# Patient Record
Sex: FEMALE | Race: BLACK OR AFRICAN AMERICAN
[De-identification: names, ages, dates, MRNs, and addresses within clinical notes are randomized per-mention and may not be internally consistent; named-entity substitution may affect disease eponyms.]

---

## 2020-05-23 ENCOUNTER — HOSPITAL ENCOUNTER (INPATIENT)
Dept: HOSPITAL 47 - EC | Age: 50
LOS: 9 days | DRG: 870 | End: 2020-06-01
Attending: FAMILY MEDICINE | Admitting: FAMILY MEDICINE
Payer: COMMERCIAL

## 2020-05-23 VITALS — BODY MASS INDEX: 29.9 KG/M2

## 2020-05-23 DIAGNOSIS — G93.1: ICD-10-CM

## 2020-05-23 DIAGNOSIS — Z11.59: ICD-10-CM

## 2020-05-23 DIAGNOSIS — N39.0: ICD-10-CM

## 2020-05-23 DIAGNOSIS — Z85.3: ICD-10-CM

## 2020-05-23 DIAGNOSIS — N17.0: ICD-10-CM

## 2020-05-23 DIAGNOSIS — E83.51: ICD-10-CM

## 2020-05-23 DIAGNOSIS — D68.9: ICD-10-CM

## 2020-05-23 DIAGNOSIS — F10.129: ICD-10-CM

## 2020-05-23 DIAGNOSIS — A41.51: Primary | ICD-10-CM
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
## 2020-05-23 DIAGNOSIS — J96.01: ICD-10-CM

## 2020-05-23 DIAGNOSIS — E16.2: ICD-10-CM

## 2020-05-23 DIAGNOSIS — R01.1: ICD-10-CM

## 2020-05-23 DIAGNOSIS — E87.6: ICD-10-CM

## 2020-05-23 DIAGNOSIS — R11.2: ICD-10-CM

## 2020-05-23 DIAGNOSIS — K70.11: ICD-10-CM

## 2020-05-23 DIAGNOSIS — H11.32: ICD-10-CM
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
## 2020-05-23 DIAGNOSIS — F41.9: ICD-10-CM

## 2020-05-23 DIAGNOSIS — R45.1: ICD-10-CM

## 2020-05-23 DIAGNOSIS — K70.31: ICD-10-CM

## 2020-05-23 DIAGNOSIS — H05.20: ICD-10-CM

## 2020-05-23 DIAGNOSIS — K91.72: ICD-10-CM

## 2020-05-23 DIAGNOSIS — D69.59: ICD-10-CM

## 2020-05-23 DIAGNOSIS — D62: ICD-10-CM

## 2020-05-23 DIAGNOSIS — T43.211A: ICD-10-CM

## 2020-05-23 DIAGNOSIS — F32.9: ICD-10-CM

## 2020-05-23 DIAGNOSIS — R65.21: ICD-10-CM

## 2020-05-23 DIAGNOSIS — F17.200: ICD-10-CM

## 2020-05-23 DIAGNOSIS — Z79.899: ICD-10-CM

## 2020-05-23 DIAGNOSIS — K72.00: ICD-10-CM

## 2020-05-23 DIAGNOSIS — R32: ICD-10-CM

## 2020-05-23 DIAGNOSIS — J98.11: ICD-10-CM

## 2020-05-23 DIAGNOSIS — T51.0X1A: ICD-10-CM

## 2020-05-23 DIAGNOSIS — R07.89: ICD-10-CM

## 2020-05-23 DIAGNOSIS — Z91.5: ICD-10-CM

## 2020-05-23 DIAGNOSIS — E87.4: ICD-10-CM

## 2020-05-23 DIAGNOSIS — K92.2: ICD-10-CM

## 2020-05-23 DIAGNOSIS — T36.95XA: ICD-10-CM

## 2020-05-23 DIAGNOSIS — E87.5: ICD-10-CM

## 2020-05-23 DIAGNOSIS — J69.0: ICD-10-CM

## 2020-05-23 DIAGNOSIS — J45.909: ICD-10-CM

## 2020-05-23 DIAGNOSIS — R19.7: ICD-10-CM

## 2020-05-23 DIAGNOSIS — R40.2434: ICD-10-CM

## 2020-05-23 DIAGNOSIS — I46.9: ICD-10-CM

## 2020-05-23 LAB
ALBUMIN SERPL-MCNC: 2.5 G/DL (ref 3.5–5)
ALBUMIN SERPL-MCNC: 3 G/DL (ref 3.5–5)
ALP SERPL-CCNC: 215 U/L (ref 38–126)
ALP SERPL-CCNC: 241 U/L (ref 38–126)
ALT SERPL-CCNC: 43 U/L (ref 4–34)
ALT SERPL-CCNC: 53 U/L (ref 4–34)
ANION GAP SERPL CALC-SCNC: 29 MMOL/L
APAP SPEC-MCNC: 11.5 UG/ML
APTT BLD: 30.6 SEC (ref 22–30)
APTT BLD: 42.3 SEC (ref 22–30)
AST SERPL-CCNC: 178 U/L (ref 14–36)
AST SERPL-CCNC: 289 U/L (ref 14–36)
BASOPHILS # BLD AUTO: 0 K/UL (ref 0–0.2)
BASOPHILS NFR BLD AUTO: 0 %
BUN SERPL-SCNC: 11 MG/DL (ref 7–17)
BUN SERPL-SCNC: 11 MG/DL (ref 7–17)
CALCIUM SPEC-MCNC: 6.6 MG/DL (ref 8.4–10.2)
CALCIUM SPEC-MCNC: 7.5 MG/DL (ref 8.4–10.2)
CHLORIDE SERPL-SCNC: 109 MMOL/L (ref 98–107)
CHLORIDE SERPL-SCNC: 111 MMOL/L (ref 98–107)
CK SERPL-CCNC: 374 U/L (ref 30–135)
CO2 BLDA-SCNC: 4 MMOL/L (ref 19–24)
CO2 BLDA-SCNC: 4 MMOL/L (ref 19–24)
CO2 BLDA-SCNC: 8 MMOL/L (ref 19–24)
CO2 BLDA-SCNC: 9 MMOL/L (ref 19–24)
CO2 SERPL-SCNC: 7 MMOL/L (ref 22–30)
CO2 SERPL-SCNC: <5 MMOL/L (ref 22–30)
EOSINOPHIL # BLD AUTO: 0.1 K/UL (ref 0–0.7)
EOSINOPHIL NFR BLD AUTO: 1 %
ERYTHROCYTE [DISTWIDTH] IN BLOOD BY AUTOMATED COUNT: 2.05 M/UL (ref 3.8–5.4)
ERYTHROCYTE [DISTWIDTH] IN BLOOD BY AUTOMATED COUNT: 2.82 M/UL (ref 3.8–5.4)
ERYTHROCYTE [DISTWIDTH] IN BLOOD: 19.9 % (ref 11.5–15.5)
ERYTHROCYTE [DISTWIDTH] IN BLOOD: 19.9 % (ref 11.5–15.5)
GLUCOSE BLD-MCNC: 135 MG/DL (ref 75–99)
GLUCOSE BLD-MCNC: 174 MG/DL (ref 75–99)
GLUCOSE BLD-MCNC: 186 MG/DL (ref 75–99)
GLUCOSE BLD-MCNC: 210 MG/DL (ref 75–99)
GLUCOSE SERPL-MCNC: 167 MG/DL (ref 74–99)
GLUCOSE SERPL-MCNC: 174 MG/DL (ref 74–99)
HCO3 BLDA-SCNC: 4 MMOL/L (ref 21–25)
HCO3 BLDA-SCNC: 4 MMOL/L (ref 21–25)
HCO3 BLDA-SCNC: 7 MMOL/L (ref 21–25)
HCO3 BLDA-SCNC: 8 MMOL/L (ref 21–25)
HCO3 BLDV-SCNC: 4 MMOL/L (ref 24–28)
HCT VFR BLD AUTO: 23.7 % (ref 34–46)
HCT VFR BLD AUTO: 28.8 % (ref 34–46)
HGB BLD-MCNC: 6.3 GM/DL (ref 11.4–16)
HGB BLD-MCNC: 8.5 GM/DL (ref 11.4–16)
HYALINE CASTS UR QL AUTO: 73 /LPF (ref 0–2)
INR PPP: 1.5 (ref ?–1.2)
INR PPP: 1.7 (ref ?–1.2)
LACTATE BLDV-SCNC: 20.4 MMOL/L (ref 0.7–2)
LYMPHOCYTES # SPEC AUTO: 1.4 K/UL (ref 1–4.8)
LYMPHOCYTES NFR SPEC AUTO: 26 %
MCH RBC QN AUTO: 30.2 PG (ref 25–35)
MCH RBC QN AUTO: 30.7 PG (ref 25–35)
MCHC RBC AUTO-ENTMCNC: 26.5 G/DL (ref 31–37)
MCHC RBC AUTO-ENTMCNC: 29.5 G/DL (ref 31–37)
MCV RBC AUTO: 102.2 FL (ref 80–100)
MCV RBC AUTO: 115.7 FL (ref 80–100)
MONOCYTES # BLD AUTO: 0.2 K/UL (ref 0–1)
MONOCYTES NFR BLD AUTO: 4 %
NEUTROPHILS # BLD AUTO: 3.8 K/UL (ref 1.3–7.7)
NEUTROPHILS NFR BLD AUTO: 69 %
PCO2 BLDA: 19 MMHG (ref 35–45)
PCO2 BLDA: 22 MMHG (ref 35–45)
PCO2 BLDA: 22 MMHG (ref 35–45)
PCO2 BLDA: 23 MMHG (ref 35–45)
PCO2 BLDV: 25 MMHG (ref 37–51)
PH BLDA: 6.81 [PH] (ref 7.35–7.45)
PH BLDA: 6.89 [PH] (ref 7.35–7.45)
PH BLDA: 7.12 [PH] (ref 7.35–7.45)
PH BLDA: 7.17 [PH] (ref 7.35–7.45)
PH BLDV: 6.84 [PH] (ref 7.31–7.41)
PH UR: 6 [PH] (ref 5–8)
PLATELET # BLD AUTO: 48 K/UL (ref 150–450)
PLATELET # BLD AUTO: 81 K/UL (ref 150–450)
PO2 BLDA: 116 MMHG (ref 83–108)
PO2 BLDA: 122 MMHG (ref 83–108)
PO2 BLDA: 165 MMHG (ref 83–108)
PO2 BLDA: >400 MMHG (ref 83–108)
POTASSIUM SERPL-SCNC: 5.4 MMOL/L (ref 3.5–5.1)
POTASSIUM SERPL-SCNC: 6.6 MMOL/L (ref 3.5–5.1)
PROT SERPL-MCNC: 5.3 G/DL (ref 6.3–8.2)
PROT SERPL-MCNC: 6.1 G/DL (ref 6.3–8.2)
PROT UR QL: (no result)
PT BLD: 14.5 SEC (ref 9–12)
PT BLD: 16.9 SEC (ref 9–12)
RBC UR QL: 82 /HPF (ref 0–5)
SALICYLATES SERPL-MCNC: 1 MG/DL
SODIUM SERPL-SCNC: 143 MMOL/L (ref 137–145)
SODIUM SERPL-SCNC: 145 MMOL/L (ref 137–145)
SP GR UR: 1.02 (ref 1–1.03)
UROBILINOGEN UR QL STRIP: <2 MG/DL (ref ?–2)
WBC # BLD AUTO: 5.4 K/UL (ref 3.8–10.6)
WBC # BLD AUTO: 5.6 K/UL (ref 3.8–10.6)
WBC # UR AUTO: >182 /HPF (ref 0–5)

## 2020-05-23 PROCEDURE — 36415 COLL VENOUS BLD VENIPUNCTURE: CPT

## 2020-05-23 PROCEDURE — 74176 CT ABD & PELVIS W/O CONTRAST: CPT

## 2020-05-23 PROCEDURE — 87186 SC STD MICRODIL/AGAR DIL: CPT

## 2020-05-23 PROCEDURE — 84484 ASSAY OF TROPONIN QUANT: CPT

## 2020-05-23 PROCEDURE — 85610 PROTHROMBIN TIME: CPT

## 2020-05-23 PROCEDURE — 36410 VNPNXR 3YR/> PHY/QHP DX/THER: CPT

## 2020-05-23 PROCEDURE — 84600 ASSAY OF VOLATILES: CPT

## 2020-05-23 PROCEDURE — 86900 BLOOD TYPING SEROLOGIC ABO: CPT

## 2020-05-23 PROCEDURE — 82803 BLOOD GASES ANY COMBINATION: CPT

## 2020-05-23 PROCEDURE — 86704 HEP B CORE ANTIBODY TOTAL: CPT

## 2020-05-23 PROCEDURE — 87205 SMEAR GRAM STAIN: CPT

## 2020-05-23 PROCEDURE — 71045 X-RAY EXAM CHEST 1 VIEW: CPT

## 2020-05-23 PROCEDURE — 81025 URINE PREGNANCY TEST: CPT

## 2020-05-23 PROCEDURE — 80320 DRUG SCREEN QUANTALCOHOLS: CPT

## 2020-05-23 PROCEDURE — 87077 CULTURE AEROBIC IDENTIFY: CPT

## 2020-05-23 PROCEDURE — 81001 URINALYSIS AUTO W/SCOPE: CPT

## 2020-05-23 PROCEDURE — 76937 US GUIDE VASCULAR ACCESS: CPT

## 2020-05-23 PROCEDURE — 36430 TRANSFUSION BLD/BLD COMPNT: CPT

## 2020-05-23 PROCEDURE — 94002 VENT MGMT INPAT INIT DAY: CPT

## 2020-05-23 PROCEDURE — 0BH17EZ INSERTION OF ENDOTRACHEAL AIRWAY INTO TRACHEA, VIA NATURAL OR ARTIFICIAL OPENING: ICD-10-PCS

## 2020-05-23 PROCEDURE — 96376 TX/PRO/DX INJ SAME DRUG ADON: CPT

## 2020-05-23 PROCEDURE — 80048 BASIC METABOLIC PNL TOTAL CA: CPT

## 2020-05-23 PROCEDURE — 86850 RBC ANTIBODY SCREEN: CPT

## 2020-05-23 PROCEDURE — 82140 ASSAY OF AMMONIA: CPT

## 2020-05-23 PROCEDURE — 93005 ELECTROCARDIOGRAM TRACING: CPT

## 2020-05-23 PROCEDURE — 87070 CULTURE OTHR SPECIMN AEROBIC: CPT

## 2020-05-23 PROCEDURE — 99291 CRITICAL CARE FIRST HOUR: CPT

## 2020-05-23 PROCEDURE — 85025 COMPLETE CBC W/AUTO DIFF WBC: CPT

## 2020-05-23 PROCEDURE — 3E033XZ INTRODUCTION OF VASOPRESSOR INTO PERIPHERAL VEIN, PERCUTANEOUS APPROACH: ICD-10-PCS

## 2020-05-23 PROCEDURE — 80329 ANALGESICS NON-OPIOID 1 OR 2: CPT

## 2020-05-23 PROCEDURE — 86920 COMPATIBILITY TEST SPIN: CPT

## 2020-05-23 PROCEDURE — 87045 FECES CULTURE AEROBIC BACT: CPT

## 2020-05-23 PROCEDURE — 87040 BLOOD CULTURE FOR BACTERIA: CPT

## 2020-05-23 PROCEDURE — 82533 TOTAL CORTISOL: CPT

## 2020-05-23 PROCEDURE — 84100 ASSAY OF PHOSPHORUS: CPT

## 2020-05-23 PROCEDURE — 83520 IMMUNOASSAY QUANT NOS NONAB: CPT

## 2020-05-23 PROCEDURE — 96368 THER/DIAG CONCURRENT INF: CPT

## 2020-05-23 PROCEDURE — 30233R1 TRANSFUSION OF NONAUTOLOGOUS PLATELETS INTO PERIPHERAL VEIN, PERCUTANEOUS APPROACH: ICD-10-PCS

## 2020-05-23 PROCEDURE — 82805 BLOOD GASES W/O2 SATURATION: CPT

## 2020-05-23 PROCEDURE — 87086 URINE CULTURE/COLONY COUNT: CPT

## 2020-05-23 PROCEDURE — 86901 BLOOD TYPING SEROLOGIC RH(D): CPT

## 2020-05-23 PROCEDURE — 83735 ASSAY OF MAGNESIUM: CPT

## 2020-05-23 PROCEDURE — 99292 CRITICAL CARE ADDL 30 MIN: CPT

## 2020-05-23 PROCEDURE — 80074 ACUTE HEPATITIS PANEL: CPT

## 2020-05-23 PROCEDURE — 83605 ASSAY OF LACTIC ACID: CPT

## 2020-05-23 PROCEDURE — 80053 COMPREHEN METABOLIC PANEL: CPT

## 2020-05-23 PROCEDURE — 80051 ELECTROLYTE PANEL: CPT

## 2020-05-23 PROCEDURE — 85730 THROMBOPLASTIN TIME PARTIAL: CPT

## 2020-05-23 PROCEDURE — 31500 INSERT EMERGENCY AIRWAY: CPT

## 2020-05-23 PROCEDURE — 94003 VENT MGMT INPAT SUBQ DAY: CPT

## 2020-05-23 PROCEDURE — 86706 HEP B SURFACE ANTIBODY: CPT

## 2020-05-23 PROCEDURE — 87635 SARS-COV-2 COVID-19 AMP PRB: CPT

## 2020-05-23 PROCEDURE — 87324 CLOSTRIDIUM AG IA: CPT

## 2020-05-23 PROCEDURE — 30233K1 TRANSFUSION OF NONAUTOLOGOUS FROZEN PLASMA INTO PERIPHERAL VEIN, PERCUTANEOUS APPROACH: ICD-10-PCS

## 2020-05-23 PROCEDURE — 96365 THER/PROPH/DIAG IV INF INIT: CPT

## 2020-05-23 PROCEDURE — 5A1955Z RESPIRATORY VENTILATION, GREATER THAN 96 CONSECUTIVE HOURS: ICD-10-PCS

## 2020-05-23 PROCEDURE — 87046 STOOL CULTR AEROBIC BACT EA: CPT

## 2020-05-23 PROCEDURE — 03H833Z INSERTION OF INFUSION DEVICE INTO LEFT BRACHIAL ARTERY, PERCUTANEOUS APPROACH: ICD-10-PCS

## 2020-05-23 PROCEDURE — 36556 INSERT NON-TUNNEL CV CATH: CPT

## 2020-05-23 PROCEDURE — 85027 COMPLETE CBC AUTOMATED: CPT

## 2020-05-23 PROCEDURE — 5A12012 PERFORMANCE OF CARDIAC OUTPUT, SINGLE, MANUAL: ICD-10-PCS

## 2020-05-23 PROCEDURE — 96375 TX/PRO/DX INJ NEW DRUG ADDON: CPT

## 2020-05-23 PROCEDURE — 80306 DRUG TEST PRSMV INSTRMNT: CPT

## 2020-05-23 PROCEDURE — 30233N1 TRANSFUSION OF NONAUTOLOGOUS RED BLOOD CELLS INTO PERIPHERAL VEIN, PERCUTANEOUS APPROACH: ICD-10-PCS

## 2020-05-23 PROCEDURE — 76770 US EXAM ABDO BACK WALL COMP: CPT

## 2020-05-23 PROCEDURE — 0DH673Z INSERTION OF INFUSION DEVICE INTO STOMACH, VIA NATURAL OR ARTIFICIAL OPENING: ICD-10-PCS

## 2020-05-23 PROCEDURE — 84132 ASSAY OF SERUM POTASSIUM: CPT

## 2020-05-23 PROCEDURE — 82550 ASSAY OF CK (CPK): CPT

## 2020-05-23 PROCEDURE — 70450 CT HEAD/BRAIN W/O DYE: CPT

## 2020-05-23 PROCEDURE — 83930 ASSAY OF BLOOD OSMOLALITY: CPT

## 2020-05-23 PROCEDURE — 36600 WITHDRAWAL OF ARTERIAL BLOOD: CPT

## 2020-05-23 PROCEDURE — 90935 HEMODIALYSIS ONE EVALUATION: CPT

## 2020-05-23 PROCEDURE — 94640 AIRWAY INHALATION TREATMENT: CPT

## 2020-05-23 PROCEDURE — 96366 THER/PROPH/DIAG IV INF ADDON: CPT

## 2020-05-23 PROCEDURE — 87340 HEPATITIS B SURFACE AG IA: CPT

## 2020-05-23 PROCEDURE — 84439 ASSAY OF FREE THYROXINE: CPT

## 2020-05-23 RX ADMIN — POTASSIUM CHLORIDE SCH MLS/HR: 14.9 INJECTION, SOLUTION INTRAVENOUS at 16:48

## 2020-05-23 RX ADMIN — CEFAZOLIN SCH MLS/HR: 330 INJECTION, POWDER, FOR SOLUTION INTRAMUSCULAR; INTRAVENOUS at 21:16

## 2020-05-23 RX ADMIN — HYDROCORTISONE SODIUM SUCCINATE SCH MG: 100 INJECTION, POWDER, FOR SOLUTION INTRAMUSCULAR; INTRAVENOUS at 16:45

## 2020-05-23 RX ADMIN — METRONIDAZOLE SCH MLS/HR: 500 INJECTION, SOLUTION INTRAVENOUS at 20:49

## 2020-05-23 RX ADMIN — LACTULOSE SCH: 20 SOLUTION ORAL at 16:22

## 2020-05-23 RX ADMIN — MIDAZOLAM SCH MLS/HR: 5 INJECTION INTRAMUSCULAR; INTRAVENOUS at 23:41

## 2020-05-23 RX ADMIN — MIDAZOLAM SCH MLS/HR: 5 INJECTION INTRAMUSCULAR; INTRAVENOUS at 14:58

## 2020-05-23 RX ADMIN — OCTREOTIDE ACETATE SCH MLS/HR: 200 INJECTION, SOLUTION INTRAVENOUS; SUBCUTANEOUS at 16:46

## 2020-05-23 NOTE — XR
EXAMINATION TYPE: XR chest 1V portable

 

DATE OF EXAM: 5/23/2020

 

COMPARISON: 5/23/2020

 

HISTORY: Tube retraction.

 

TECHNIQUE: Single frontal view of the chest is obtained.

 

FINDINGS: Artifact ischemia is seen overlying the patient, which could not be removed. Perihilar opac
ities could be atelectasis as there is right mainstem bronchial intubation. Enteric tube appears appr
opriate. Cardiac loop recorder is seen. Enlarged cardiomediastinal silhouette.

 

IMPRESSION:  Right mainstem bronchial intubation remains. Retraction of 2 cm is recommended.

## 2020-05-23 NOTE — XR
EXAMINATION TYPE: XR chest 1V

 

DATE OF EXAM: 5/23/2020

 

COMPARISON: None 

 

HISTORY: Status post intubation

 

TECHNIQUE: Single frontal view of the chest is obtained.

 

FINDINGS:  Endotracheal tube is present within the right mainstem bronchus. NG tube is present and is
 coursing to the level overlying the stomach the left upper quadrant. There is a loop recorder over t
he left heart. No evident pneumothorax. No pleural effusion. Lung lines are low. Perihilar increased 
attenuation is greater on the right than on the left. There are overlying cardiac leads.

 

IMPRESSION:  Selective intubation. Low lung volumes, possible perihilar atelectatic change. Follow-up
 recommended. Report relayed telephonically to the referring clinician at the time of interpretation.

## 2020-05-23 NOTE — CT
EXAMINATION TYPE: CT abdomen pelvis wo con

 

DATE OF EXAM: 5/23/2020

 

COMPARISON: None

 

HISTORY: abnormal labs, acidosis

 

CT DLP: 649.7 mGycm

Automated exposure control for dose reduction was used.

 

TECHNIQUE:  Helical acquisition of images was performed from the lung bases through the pelvis.

 

FINDINGS: Lack of intravenous and oral contrast limit evaluation of both the hollow and solid viscera
.

 

LUNG BASES: Strand-like bibasilar opacities, left greater than right are likely on the basis of atele
ctasis.

 

LIVER/GB: Severe hypoattenuation of the hepatic parenchyma most commonly relates to hepatic steatosis
 and limits evaluation for hepatic masses. A punctate central focus of pneumobilia is incidentally se
en. Crescentic fluid surrounds the liver although this appears hyperdense in comparison to the liver 
measures an average Hounsfield unit of that of simple fluid on multiple evaluations. Small amount of 
perisplenic fluid is also seen. Gallbladder appears hyperdense in comparison to the liver, possibly r
elated to diffuse biliary sludge or cholelithiasis.

 

PANCREAS: Pancreatic parenchymal calcifications are indicative of chronic pancreatitis.

 

SPLEEN: Small amount of perisplenic fluid.

 

ADRENALS: Grossly unremarkable in unenhanced morphology.

 

KIDNEYS: There is fullness of the collecting systems bilaterally although suboptimally evaluated with
out contrast. No nephrolithiasis.

 

FREE AIR:  No free air is visualized

 

ADENOPATHY:  Grossly limited by lack of intravenous and oral contrast, ascites, and mesenteric edema.
 Overall nondiagnostic exam for adenopathy.

 

OSSEOUS STRUCTURES:  Minimal retrolisthesis of L5 on S1 likely on a degenerative basis. Mild degenera
tive change of the spine overall.

 

BOWEL:  There is severe diffuse bowel wall thickening throughout the entirety of the colon with ascit
es and mesenteric edema limiting evaluation for pericolonic fat stranding.

 

OTHER: Right-sided femoral approach central venous catheter is seen. Enteric tube is also placed. Fem
oral catheter terminates in the common femoral vein and enteric tube within the stomach. Urinary blad
clary is decompressed by Foster catheter placement. Trace pericardial effusion partially visualized.

 

IMPRESSION: 

1.  SEVERE PANCOLONIC BOWEL WALL THICKENING WITH MESENTERIC EDEMA AND SMALL VOLUME ASCITES. GIVEN THE
 ELEVATED LACTIC ACID ISCHEMIC BOWEL SHOULD BE EXCLUDED. GIVEN THE DIFFUSE INVOLVEMENT C. DIFFICILE I
S AN ALTERNATIVE CONSIDERATION.

2. FEW AREAS OF THE FLUID IS SLIGHTLY HYPERDENSE THAT COULD RELATE TO PROTEINACEOUS CONTENT OR HEMOPE
RITONEUM. CLOSE SURVEILLANCE WITH HEMATOCRIT AND HEMOGLOBIN IS RECOMMENDED AS THE UNENHANCED EXAM IS 
LIMITED TO EVALUATE VISCERAL TRAUMA.

3. SEVERE DEGREE HEPATIC STEATOSIS.

4. BIBASILAR AIRSPACE DISEASE, LEFT GREATER THAN RIGHT. THIS IS LIKELY ON THE BASIS OF ATELECTASIS AL
THOUGH PNEUMONIA IS POSSIBLE.

5. MILD FULLNESS OF THE RENAL COLLECTING SYSTEMS/MILD HYDRONEPHROSIS WITHOUT RADIOPAQUE OBSTRUCTING C
ALCULUS.

## 2020-05-23 NOTE — P.GSCN
History of Present Illness


Consult date: 05/23/20


History of present illness: 


48 y/o female who was found down at home by her boyfriend. History of ETOH 

absue. Boyfriend states she was having normal BM yesterday and no NV. Only 

complaint was backpain according to him. Patient coded in ER and was intubated. 

Now intubated on high dose pressors in ICU. CT showed thickened colon and 

general surgery was consulted








Past Medical History


Past Medical History: Unable to Obtain


History of Any Multi-Drug Resistant Organisms: Unobtainable


Past Surgical History: Unable to Obtain


Past Psychological History: Unable to Obtain


Smoking Status: Unknown if ever smoked


Past Alcohol Use History: Unable to Obtain


Past Drug Use History: Unable to Obtain





Medications and Allergies


                                Home Medications











 Medication  Instructions  Recorded  Confirmed  Type


 


Unable To Assess [Unable to Assess]  05/23/20 05/23/20 History








                                    Allergies











Allergy/AdvReac Type Severity Reaction Status Date / Time


 


Unable to Assess Allergy   Verified 05/23/20 12:38














Surgical - Exam


Osteopathic Statement: *.  No significant issues noted on an osteopathic st

ructural exam other than those noted in the History and Physical/Consult.


                                   Vital Signs











Pulse Resp BP Pulse Ox


 


 80   22   65/37   96 


 


 05/23/20 12:38  05/23/20 12:38  05/23/20 12:38  05/23/20 12:38














- General


intubated and sedated. opens eyes but no meaningful response





- Eyes


PERRL





- Neck


no masses, trachea midline





- Respiratory





intubated sedated





- Abdomen





soft/distended/ no peritoneal signs on exam





- Psychiatric





intubated and sedated





Results





- Labs





                                 05/23/20 12:15





                                 05/23/20 12:15


                  Abnormal Lab Results - Last 24 Hours (Table)











  05/23/20 05/23/20 05/23/20 Range/Units





  12:14 12:15 12:15 


 


RBC    2.05 L  (3.80-5.40)  m/uL


 


Hgb    6.3 L*  (11.4-16.0)  gm/dL


 


Hct    23.7 L  (34.0-46.0)  %


 


MCV    115.7 H  (80.0-100.0)  fL


 


MCHC    26.5 L  (31.0-37.0)  g/dL


 


RDW    19.9 H  (11.5-15.5)  %


 


Plt Count    48 L  (150-450)  k/uL


 


Macrocytosis    Marked A  


 


PT     (9.0-12.0)  sec


 


INR     (<1.2)  


 


APTT     (22.0-30.0)  sec


 


ABG pH     (7.35-7.45)  


 


ABG pCO2     (35-45)  mmHg


 


ABG pO2     ()  mmHg


 


ABG HCO3     (21-25)  mmol/L


 


ABG Total CO2     (19-24)  mmol/L


 


ABG O2 Saturation     (94-97)  %


 


VBG pH   6.84 L*   (7.31-7.41)  


 


VBG pCO2   25 L   (37-51)  mmHg


 


VBG HCO3   4 L*   (24-28)  mmol/L


 


Potassium     (3.5-5.1)  mmol/L


 


Chloride     ()  mmol/L


 


Carbon Dioxide     (22-30)  mmol/L


 


Creatinine     (0.52-1.04)  mg/dL


 


Glucose     (74-99)  mg/dL


 


POC Glucose (mg/dL)  174 H    (75-99)  mg/dL


 


Plasma Lactic Acid Scott     (0.7-2.0)  mmol/L


 


Calcium     (8.4-10.2)  mg/dL


 


AST     (14-36)  U/L


 


ALT     (4-34)  U/L


 


Alkaline Phosphatase     ()  U/L


 


Ammonia     (<30)  umol/L


 


Creatine Kinase     ()  U/L


 


Total Protein     (6.3-8.2)  g/dL


 


Albumin     (3.5-5.0)  g/dL


 


Urine Appearance     (Clear)  


 


Urine Protein     (Negative)  


 


Urine Glucose (UA)     (Negative)  


 


Urine Blood     (Negative)  


 


Ur Leukocyte Esterase     (Negative)  


 


Urine RBC     (0-5)  /hpf


 


Urine WBC     (0-5)  /hpf


 


Urine WBC Clumps     (None)  /hpf


 


Amorphous Sediment     (None)  /hpf


 


Urine Bacteria     (None)  /hpf


 


Hyaline Casts     (0-2)  /lpf


 


Urine Mucus     (None)  /hpf


 


Serum Alcohol     mg/dL


 


Crossmatch     














  05/23/20 05/23/20 05/23/20 Range/Units





  12:15 12:15 12:15 


 


RBC     (3.80-5.40)  m/uL


 


Hgb     (11.4-16.0)  gm/dL


 


Hct     (34.0-46.0)  %


 


MCV     (80.0-100.0)  fL


 


MCHC     (31.0-37.0)  g/dL


 


RDW     (11.5-15.5)  %


 


Plt Count     (150-450)  k/uL


 


Macrocytosis     


 


PT  16.9 H    (9.0-12.0)  sec


 


INR  1.7 H    (<1.2)  


 


APTT  42.3 H    (22.0-30.0)  sec


 


ABG pH     (7.35-7.45)  


 


ABG pCO2     (35-45)  mmHg


 


ABG pO2     ()  mmHg


 


ABG HCO3     (21-25)  mmol/L


 


ABG Total CO2     (19-24)  mmol/L


 


ABG O2 Saturation     (94-97)  %


 


VBG pH     (7.31-7.41)  


 


VBG pCO2     (37-51)  mmHg


 


VBG HCO3     (24-28)  mmol/L


 


Potassium   6.6 H*   (3.5-5.1)  mmol/L


 


Chloride   111 H   ()  mmol/L


 


Carbon Dioxide   <5 L*   (22-30)  mmol/L


 


Creatinine   3.55 H   (0.52-1.04)  mg/dL


 


Glucose   167 H   (74-99)  mg/dL


 


POC Glucose (mg/dL)     (75-99)  mg/dL


 


Plasma Lactic Acid Scott    20.4 H*  (0.7-2.0)  mmol/L


 


Calcium   7.5 L   (8.4-10.2)  mg/dL


 


AST   178 H   (14-36)  U/L


 


ALT   43 H   (4-34)  U/L


 


Alkaline Phosphatase   215 H   ()  U/L


 


Ammonia    199 H  (<30)  umol/L


 


Creatine Kinase   374 H   ()  U/L


 


Total Protein   5.3 L   (6.3-8.2)  g/dL


 


Albumin   2.5 L   (3.5-5.0)  g/dL


 


Urine Appearance     (Clear)  


 


Urine Protein     (Negative)  


 


Urine Glucose (UA)     (Negative)  


 


Urine Blood     (Negative)  


 


Ur Leukocyte Esterase     (Negative)  


 


Urine RBC     (0-5)  /hpf


 


Urine WBC     (0-5)  /hpf


 


Urine WBC Clumps     (None)  /hpf


 


Amorphous Sediment     (None)  /hpf


 


Urine Bacteria     (None)  /hpf


 


Hyaline Casts     (0-2)  /lpf


 


Urine Mucus     (None)  /hpf


 


Serum Alcohol   274 H*   mg/dL


 


Crossmatch     














  05/23/20 05/23/20 05/23/20 Range/Units





  12:15 13:25 14:22 


 


RBC     (3.80-5.40)  m/uL


 


Hgb     (11.4-16.0)  gm/dL


 


Hct     (34.0-46.0)  %


 


MCV     (80.0-100.0)  fL


 


MCHC     (31.0-37.0)  g/dL


 


RDW     (11.5-15.5)  %


 


Plt Count     (150-450)  k/uL


 


Macrocytosis     


 


PT     (9.0-12.0)  sec


 


INR     (<1.2)  


 


APTT     (22.0-30.0)  sec


 


ABG pH   6.81 L*   (7.35-7.45)  


 


ABG pCO2   22 L   (35-45)  mmHg


 


ABG pO2   >400 H   ()  mmHg


 


ABG HCO3   4 L*   (21-25)  mmol/L


 


ABG Total CO2   4 L   (19-24)  mmol/L


 


ABG O2 Saturation   99.3 H   (94-97)  %


 


VBG pH     (7.31-7.41)  


 


VBG pCO2     (37-51)  mmHg


 


VBG HCO3     (24-28)  mmol/L


 


Potassium     (3.5-5.1)  mmol/L


 


Chloride     ()  mmol/L


 


Carbon Dioxide     (22-30)  mmol/L


 


Creatinine     (0.52-1.04)  mg/dL


 


Glucose     (74-99)  mg/dL


 


POC Glucose (mg/dL)     (75-99)  mg/dL


 


Plasma Lactic Acid Scott     (0.7-2.0)  mmol/L


 


Calcium     (8.4-10.2)  mg/dL


 


AST     (14-36)  U/L


 


ALT     (4-34)  U/L


 


Alkaline Phosphatase     ()  U/L


 


Ammonia     (<30)  umol/L


 


Creatine Kinase     ()  U/L


 


Total Protein     (6.3-8.2)  g/dL


 


Albumin     (3.5-5.0)  g/dL


 


Urine Appearance    Turbid H  (Clear)  


 


Urine Protein    2+ H  (Negative)  


 


Urine Glucose (UA)    Trace H  (Negative)  


 


Urine Blood    Moderate H  (Negative)  


 


Ur Leukocyte Esterase    Large H  (Negative)  


 


Urine RBC    82 H  (0-5)  /hpf


 


Urine WBC    >182 H  (0-5)  /hpf


 


Urine WBC Clumps    Many H  (None)  /hpf


 


Amorphous Sediment    Rare H  (None)  /hpf


 


Urine Bacteria    Many H  (None)  /hpf


 


Hyaline Casts    73 H  (0-2)  /lpf


 


Urine Mucus    Many H  (None)  /hpf


 


Serum Alcohol     mg/dL


 


Crossmatch  See Detail    














  05/23/20 05/23/20 05/23/20 Range/Units





  14:29 16:26 17:57 


 


RBC     (3.80-5.40)  m/uL


 


Hgb     (11.4-16.0)  gm/dL


 


Hct     (34.0-46.0)  %


 


MCV     (80.0-100.0)  fL


 


MCHC     (31.0-37.0)  g/dL


 


RDW     (11.5-15.5)  %


 


Plt Count     (150-450)  k/uL


 


Macrocytosis     


 


PT     (9.0-12.0)  sec


 


INR     (<1.2)  


 


APTT     (22.0-30.0)  sec


 


ABG pH   6.89 L*   (7.35-7.45)  


 


ABG pCO2   19 L*   (35-45)  mmHg


 


ABG pO2   165 H   ()  mmHg


 


ABG HCO3   4 L*   (21-25)  mmol/L


 


ABG Total CO2   4 L   (19-24)  mmol/L


 


ABG O2 Saturation   98.1 H   (94-97)  %


 


VBG pH     (7.31-7.41)  


 


VBG pCO2     (37-51)  mmHg


 


VBG HCO3     (24-28)  mmol/L


 


Potassium     (3.5-5.1)  mmol/L


 


Chloride     ()  mmol/L


 


Carbon Dioxide     (22-30)  mmol/L


 


Creatinine     (0.52-1.04)  mg/dL


 


Glucose     (74-99)  mg/dL


 


POC Glucose (mg/dL)  210 H   135 H  (75-99)  mg/dL


 


Plasma Lactic Acid Scott     (0.7-2.0)  mmol/L


 


Calcium     (8.4-10.2)  mg/dL


 


AST     (14-36)  U/L


 


ALT     (4-34)  U/L


 


Alkaline Phosphatase     ()  U/L


 


Ammonia     (<30)  umol/L


 


Creatine Kinase     ()  U/L


 


Total Protein     (6.3-8.2)  g/dL


 


Albumin     (3.5-5.0)  g/dL


 


Urine Appearance     (Clear)  


 


Urine Protein     (Negative)  


 


Urine Glucose (UA)     (Negative)  


 


Urine Blood     (Negative)  


 


Ur Leukocyte Esterase     (Negative)  


 


Urine RBC     (0-5)  /hpf


 


Urine WBC     (0-5)  /hpf


 


Urine WBC Clumps     (None)  /hpf


 


Amorphous Sediment     (None)  /hpf


 


Urine Bacteria     (None)  /hpf


 


Hyaline Casts     (0-2)  /lpf


 


Urine Mucus     (None)  /hpf


 


Serum Alcohol     mg/dL


 


Crossmatch     














  05/23/20 Range/Units





  20:46 


 


RBC   (3.80-5.40)  m/uL


 


Hgb   (11.4-16.0)  gm/dL


 


Hct   (34.0-46.0)  %


 


MCV   (80.0-100.0)  fL


 


MCHC   (31.0-37.0)  g/dL


 


RDW   (11.5-15.5)  %


 


Plt Count   (150-450)  k/uL


 


Macrocytosis   


 


PT   (9.0-12.0)  sec


 


INR   (<1.2)  


 


APTT   (22.0-30.0)  sec


 


ABG pH  7.12 L*  (7.35-7.45)  


 


ABG pCO2  22 L  (35-45)  mmHg


 


ABG pO2  122 H  ()  mmHg


 


ABG HCO3  7 L*  (21-25)  mmol/L


 


ABG Total CO2  8 L  (19-24)  mmol/L


 


ABG O2 Saturation  98.1 H  (94-97)  %


 


VBG pH   (7.31-7.41)  


 


VBG pCO2   (37-51)  mmHg


 


VBG HCO3   (24-28)  mmol/L


 


Potassium   (3.5-5.1)  mmol/L


 


Chloride   ()  mmol/L


 


Carbon Dioxide   (22-30)  mmol/L


 


Creatinine   (0.52-1.04)  mg/dL


 


Glucose   (74-99)  mg/dL


 


POC Glucose (mg/dL)   (75-99)  mg/dL


 


Plasma Lactic Acid Scott   (0.7-2.0)  mmol/L


 


Calcium   (8.4-10.2)  mg/dL


 


AST   (14-36)  U/L


 


ALT   (4-34)  U/L


 


Alkaline Phosphatase   ()  U/L


 


Ammonia   (<30)  umol/L


 


Creatine Kinase   ()  U/L


 


Total Protein   (6.3-8.2)  g/dL


 


Albumin   (3.5-5.0)  g/dL


 


Urine Appearance   (Clear)  


 


Urine Protein   (Negative)  


 


Urine Glucose (UA)   (Negative)  


 


Urine Blood   (Negative)  


 


Ur Leukocyte Esterase   (Negative)  


 


Urine RBC   (0-5)  /hpf


 


Urine WBC   (0-5)  /hpf


 


Urine WBC Clumps   (None)  /hpf


 


Amorphous Sediment   (None)  /hpf


 


Urine Bacteria   (None)  /hpf


 


Hyaline Casts   (0-2)  /lpf


 


Urine Mucus   (None)  /hpf


 


Serum Alcohol   mg/dL


 


Crossmatch   








                                 Diabetes panel











  05/23/20 Range/Units





  12:15 


 


Sodium  143  (137-145)  mmol/L


 


Potassium  6.6 H*  (3.5-5.1)  mmol/L


 


Chloride  111 H  ()  mmol/L


 


Carbon Dioxide  <5 L*  (22-30)  mmol/L


 


BUN  11  (7-17)  mg/dL


 


Creatinine  3.55 H  (0.52-1.04)  mg/dL


 


Glucose  167 H  (74-99)  mg/dL


 


Calcium  7.5 L  (8.4-10.2)  mg/dL


 


AST  178 H  (14-36)  U/L


 


ALT  43 H  (4-34)  U/L


 


Alkaline Phosphatase  215 H  ()  U/L


 


Total Protein  5.3 L  (6.3-8.2)  g/dL


 


Albumin  2.5 L  (3.5-5.0)  g/dL








                                  Calcium panel











  05/23/20 Range/Units





  12:15 


 


Calcium  7.5 L  (8.4-10.2)  mg/dL


 


Albumin  2.5 L  (3.5-5.0)  g/dL








                                 Pituitary panel











  05/23/20 Range/Units





  12:15 


 


Sodium  143  (137-145)  mmol/L


 


Potassium  6.6 H*  (3.5-5.1)  mmol/L


 


Chloride  111 H  ()  mmol/L


 


Carbon Dioxide  <5 L*  (22-30)  mmol/L


 


BUN  11  (7-17)  mg/dL


 


Creatinine  3.55 H  (0.52-1.04)  mg/dL


 


Glucose  167 H  (74-99)  mg/dL


 


Calcium  7.5 L  (8.4-10.2)  mg/dL








                                  Adrenal panel











  05/23/20 Range/Units





  12:15 


 


Sodium  143  (137-145)  mmol/L


 


Potassium  6.6 H*  (3.5-5.1)  mmol/L


 


Chloride  111 H  ()  mmol/L


 


Carbon Dioxide  <5 L*  (22-30)  mmol/L


 


BUN  11  (7-17)  mg/dL


 


Creatinine  3.55 H  (0.52-1.04)  mg/dL


 


Glucose  167 H  (74-99)  mg/dL


 


Calcium  7.5 L  (8.4-10.2)  mg/dL


 


Total Bilirubin  0.8  (0.2-1.3)  mg/dL


 


AST  178 H  (14-36)  U/L


 


ALT  43 H  (4-34)  U/L


 


Alkaline Phosphatase  215 H  ()  U/L


 


Total Protein  5.3 L  (6.3-8.2)  g/dL


 


Albumin  2.5 L  (3.5-5.0)  g/dL














Assessment and Plan


Assessment: 


VDRF, Colitis, likely septic shock, UTI, etoh abuse, coagulopathic  


Plan: 


Patient is critically ill and unstable. She is severely acidotic and 

coagulopathic. She also is thrombocytopenic. She is not a surgical candidate at 

this time. Recommend continued ICU care and resuscitation along with empiric 

broad spectum antibiotics. Very poor prognosis

## 2020-05-23 NOTE — CT
EXAMINATION TYPE: CT brain wo con

 

DATE OF EXAM: 5/23/2020

 

COMPARISON: None

 

HISTORY: Altered mental status

 

CT DLP: 1212.4 mGycm.  Automated Exposure Control for Dose Reduction was Utilized.

 

 

TECHNIQUE: CT scan of the head is performed without contrast.

 

FINDINGS:   There is motion on the exam. There is no acute intracranial hemorrhage, mass effect, or m
idline shift identified.  The ventricles and sulci are within normal limits in size.  The globes are 
intact and the visualized sinuses are clear.

 

IMPRESSION:  No acute intracranial hemorrhage, mass effect, or midline shift is seen.

## 2020-05-23 NOTE — ED
Altered Mental Status HPI





- General


Chief Complaint: Altered Mental Status


Stated Complaint: Unconscious


Time Seen by Provider: 05/23/20 12:11


Source: EMS


Mode of arrival: EMS


Limitations: altered mental status





- History of Present Illness


Initial Comments: 





E patient is a 49-year-old female with unknown past medical history presents to 

the emergency department after EMS was called for unresponsive state.  The 

patient lives with a friend.  The friend states that the patient awoke at 8:30 

this morning and took her trazodone.  She then went back into her room.  He 

checked on her round 11:30 and found her unresponsive.  EMS arrived to find the 

patient laying supine on the ground covered with a blanket.  They found her 

sugars to be low and therefore gave her an amp of dextrose.  She did awaken 

somewhat.  She was yelling in the back of the ambulance.  She opens her eyes to 

tactile stimuli and repetitively states "stop".  She arrives and does not follow

commands.  Friend states that she had a history of psychiatric disease however 

cannot comment on her other medical history.  She recently moved area.  She does

have some medications with her however friend states that he never sees her take

them.  He cannot provide any additional history.  The remainder of the HPI is 

limited as the patient is altered





- Related Data


                                Home Medications











 Medication  Instructions  Recorded  Confirmed


 


Acetaminophen [Tylenol 8 Hour] 650 mg PO DAILY 05/26/20 05/26/20


 


Citalopram Hydrobromide 20 mg PO DAILY PRN 05/26/20 05/26/20





[Citalopram HBr]   


 


Famotidine 20 mg PO HS 05/26/20 05/26/20


 


Ferrous Sulfate [Feosol] 325 mg PO DAILY 05/26/20 05/26/20


 


Lisinopril [Prinivil] 10 mg PO DAILY 05/26/20 05/26/20


 


Loratadine 10 mg PO DAILY 05/26/20 05/26/20


 


Metoprolol Tartrate [Lopressor] 50 mg PO BID 05/26/20 05/26/20


 


Omeprazole 20 mg PO DAILY 05/26/20 05/26/20


 


risperiDONE [RisperDAL] 4 mg PO HS 05/26/20 05/26/20


 


traZODone  mg PO DAILY 05/26/20 05/26/20











                                    Allergies











Allergy/AdvReac Type Severity Reaction Status Date / Time


 


Unable to Assess Allergy   Verified 05/23/20 12:38














Review of Systems


ROS Statement: 


Those systems with pertinent positive or pertinent negative responses have been 

documented in the HPI.





ROS Other: All systems not noted in ROS Statement are negative.





Past Medical History


Past Medical History: Unable to Obtain


History of Any Multi-Drug Resistant Organisms: Unobtainable


Past Surgical History: Unable to Obtain


Past Psychological History: Unable to Obtain


Smoking Status: Unknown if ever smoked


Past Alcohol Use History: Unable to Obtain


Past Drug Use History: Unable to Obtain





General Exam


Limitations: altered mental status


General appearance: obtunded


Head exam: Present: atraumatic, normocephalic, normal inspection


Eye exam: Present: other (4 to 3, sluggish )


ENT exam: Present: mucous membranes dry


Neck exam: Absent: meningismus


Respiratory exam: Present: normal lung sounds bilaterally, other (normal 

spontanous respirations).  Absent: respiratory distress, rales, rhonchi


Cardiovascular Exam: Present: normal rhythm, tachycardia


GI/Abdominal exam: Present: soft, tenderness (generalized upon palpation. ).  

Absent: guarding, rebound, rigid


Extremities exam: Present: normal inspection, full ROM, normal capillary refill.

  Absent: tenderness, pedal edema, joint swelling, calf tenderness


Neurological exam: Present: altered


Skin exam: Present: diaphoretic, pallor





Course


                                   Vital Signs











  05/23/20 05/23/20 05/23/20





  12:38 13:26 14:02


 


Temperature  90.1 F L 


 


Pulse Rate 80  85


 


Pulse Rate [  92 





Right Supine]   


 


Respiratory 22 14 17





Rate   


 


Blood Pressure 65/37  88/40


 


Blood Pressure  93/44 





[Right Arm]   


 


O2 Sat by Pulse 96 100 100





Oximetry   














  05/23/20 05/23/20 05/23/20





  14:10 14:20 14:30


 


Temperature   


 


Pulse Rate 85 88 


 


Pulse Rate [   





Right Supine]   


 


Respiratory 15 16 





Rate   


 


Blood Pressure 93/44 97/45 96/45


 


Blood Pressure   





[Right Arm]   


 


O2 Sat by Pulse 100 100 





Oximetry   














  05/23/20 05/23/20 05/23/20





  14:40 14:50 15:00


 


Temperature   


 


Pulse Rate 89 84 95


 


Pulse Rate [   





Right Supine]   


 


Respiratory 20 14 17





Rate   


 


Blood Pressure 90/45 96/49 92/46


 


Blood Pressure   





[Right Arm]   


 


O2 Sat by Pulse 100 100 100





Oximetry   














  05/23/20 05/23/20 05/23/20





  15:01 15:05 15:10


 


Temperature 90 F L  


 


Pulse Rate 96 98 96


 


Pulse Rate [   





Right Supine]   


 


Respiratory 17 16 16





Rate   


 


Blood Pressure 106/52 107/43 102/46


 


Blood Pressure   





[Right Arm]   


 


O2 Sat by Pulse  100 100





Oximetry   














  05/23/20 05/23/20 05/23/20





  15:15 15:20 15:30


 


Temperature 90 F L  


 


Pulse Rate 101 H 98 105 H


 


Pulse Rate [   





Right Supine]   


 


Respiratory 17 13 15





Rate   


 


Blood Pressure 111/65 106/60 115/82


 


Blood Pressure   





[Right Arm]   


 


O2 Sat by Pulse 100 100 100





Oximetry   














  05/23/20 05/23/20 05/23/20





  15:39 15:40 15:50


 


Temperature 90 F L  


 


Pulse Rate 100 101 H 103 H


 


Pulse Rate [   





Right Supine]   


 


Respiratory 17 14 20





Rate   


 


Blood Pressure 116/73 116/73 128/76


 


Blood Pressure   





[Right Arm]   


 


O2 Sat by Pulse  100 100





Oximetry   














  05/23/20 05/23/20 05/23/20





  16:00 16:10 16:20


 


Temperature   


 


Pulse Rate 106 H 108 H 109 H


 


Pulse Rate [   





Right Supine]   


 


Respiratory 18 18 18





Rate   


 


Blood Pressure 126/78 140/77 144/80


 


Blood Pressure   





[Right Arm]   


 


O2 Sat by Pulse 100 100 100





Oximetry   














  05/23/20 05/23/20 05/23/20





  16:30 16:40 16:50


 


Temperature   


 


Pulse Rate 108 H 105 H 105 H


 


Pulse Rate [   





Right Supine]   


 


Respiratory 19 14 16





Rate   


 


Blood Pressure 150/80 136/75 132/70


 


Blood Pressure   





[Right Arm]   


 


O2 Sat by Pulse 100 100 100





Oximetry   














  05/23/20 05/23/20 05/23/20





  17:00 17:10 17:20


 


Temperature   


 


Pulse Rate 104 H 105 H 100


 


Pulse Rate [   





Right Supine]   


 


Respiratory 18 22 11 L





Rate   


 


Blood Pressure 128/72 135/72 121/71


 


Blood Pressure   





[Right Arm]   


 


O2 Sat by Pulse 99 100 100





Oximetry   














  05/23/20 05/23/20





  17:30 17:40


 


Temperature  94.2 F L


 


Pulse Rate 101 H 102 H


 


Pulse Rate [  





Right Supine]  


 


Respiratory 23 14





Rate  


 


Blood Pressure 136/69 128/67


 


Blood Pressure  





[Right Arm]  


 


O2 Sat by Pulse 100 





Oximetry  














Procedures





- Central Line Placement


  ** Right Femoral


Consent Obtained: emergent situation


Patient Placed on Monitor/Pulse Ox: Yes


MD Prep: mask, gown, gloves


Central Line Prep: Povidone-Iodine 1%


Ultrasound Used for Placement: Yes


Central Line Lumen Inserted: triple


Bloods Obtained for Lab: No


Central Line Position: good blood return, all ports aspirated, flushed, capped, 

sutured in place with nylon


Dressing Applied: Tegaderm


Patient Tolerated Procedure: no complications





- Intubation


Laryngoscope: other (glidescope)


Size: 3


ET Tube Size: 7.5


ET Tube Uncuffed: No


Tube Secured Depth (cm): 25 (cm)


Tube Secured Location: lips


Tube Placement Confirmation: visualized tube passing through cords, equal breath

sounds bilaterally, confirmation by capnometry


Patient Tolerated Procedure: no complications


Additional Comments: 





ET tube retracted twice due to right mainstem intubation





Medical Decision Making





- Medical Decision Making


Upon arrival the patient is placed in a trauma 2.  She is protecting her airway.

 She does open her eyes and look around to tactile stimuli.  She is hooked up to

continuous pulse ox and cardiac monitoring.  A peripheral IV had been placed by 

EMS.  A second line was established.  Patient was started on a 2 L bolus of norm

al saline due to her hypotension.  Laboratory studies were drawn.  Accu-Chek was

performed and was 174.  A bedside FAST exam was performed to evaluate for 

hypotension.  No signs of aortic dissection or free fluid in the pelvis.  A 12-

lead EKG was performed which demonstrated a normal sinus rhythm with a 

ventricular rate of 81.  MI interval 162.  .  Narrow QRS complex.  QTC 

prolonged at 506.  Peaked T waves in V2 through V4.  The patient was immediately

sent over for CT of her brain because of her altered mental status.  Patient is 

returned to the trauma bay.  She remains hypotensive.  Levophed is ordered 

however the patient does become bradycardic, then asystolic.  Pulses can not be 

palpated and compressions are started.  Patient was given a dose of epinephrine.

Glidescope intubation was performed wth 7.5 cm tube placed 25 cm at the lips.  

One round of compressions was performed and the patient did achieve pulses back 

- downtime approximately 3 minutes.  A right femoral central line was placed.  

Chest x-ray was performed which demonstrated mainstem bronchus intubation.  The 

patient's tube was retracted. Repeat x-ray was performed after tube was 

retracted to 23 cm. OG tube placed immediately put out 75 cc of bright red 

blood. The patient does have stable blood pressures for a short period of time 

however they did fall again and the patient is started on Levophed.  A VBG was 

performed which demonstrated a pH of 6.8.  CO2 of 25 and CO3 of 4.  Because this

patient was given an amp of bicarb.  The patient was also given 10 units of 

insulin and an amp of dextrose because of her hyperkalemia with peaked T waves. 

Patient received her full 2 L bolus of normal saline.  Patient additional 

laboratory studies are markedly off to include hemoglobin of 6.3.  She was typed

and screened and a unit of blood was ordered.  Platelets are only 48 therefore 

platelets are ordered.  INR is 1.7.  Creatinine 3.5.  .  ALTs 43.  Alk 

phos 215.  .  Ammonia is 199.  Serum alcohol 274.  I did order an 

additional amp of bicarb as well as a bicarb drip as the patient does have a 

repeat gas which shows a pH of 6.8 and a bicarb of 4.  The patient's weaned down

on her O2 and respiratory rate.  The patient does awaken a little and therefore 

she is given 2 mg of Versed and started on a Versed drip.  Patient given a dose 

of Protonix 80 mg.  CT brain demonstrates no acute intracranial hemorrhage, mass

effect or midline shift.  She is also sent over for a CT of her abdomen and 

pelvis without contrast.  The patient was transfused one unit of blood.  

Lactulose ordered for her high ammonia level.  Antibiotics were placed for the 

acute urinary tract infection.Patient was sent over for the CT without contrast 

of her abdomen which demonstrated severe pancolitis with mesenteric edema and 

small volume ascites.  Ischemic bowel should be excluded.  Few areas of fluid 

which is hyperdense could relate to proteinaceous continence or hemoperitoneum. 

Severe degree of hepatic steatosis.  Bibasilar airspace disease left greater 

than right.  Mild fullness of the renal collecting system without radiopaque 

calculus.  This read I did discuss the case with Dr. brandon at 1509 p.m.  He is 

aware of the consult.  Patient is unstable at this time to be taken for surgery.

 I discussed the case with Dr. Ortiz at 1519 PM.  He requested the patient be 

placed on vasopressin.  I discussed the case with Dr. Stephen at 1410 who 

requested lites every 4.  Patient will remain at 100 mL on the bicarb drip.  I 

also discussed case with Dr. Bowles at 1620 who requested octreotide drip.  

Patient was given Rocephin originally for her UTI.  After the addition of the CT

read from the abdomen I did add on Zosyn.  Repeat chest x-ray continues to 

demonstrate a right mainstem intubation and therefore we did retract the tube to

21 cm.  The patient was admitted to Dr. Avery who I discussed the case with at

1530.  Patient is then transferred to the unit in critical condition





- Lab Data


Result diagrams: 


                                 05/26/20 04:00





                                 05/26/20 04:00


                                   Lab Results











  05/23/20 05/23/20 05/23/20 Range/Units





  08:18 08:18 12:14 


 


WBC     (3.8-10.6)  k/uL


 


RBC     (3.80-5.40)  m/uL


 


Hgb     (11.4-16.0)  gm/dL


 


Hct     (34.0-46.0)  %


 


MCV     (80.0-100.0)  fL


 


MCH     (25.0-35.0)  pg


 


MCHC     (31.0-37.0)  g/dL


 


RDW     (11.5-15.5)  %


 


Plt Count     (150-450)  k/uL


 


Neutrophils %     %


 


Lymphocytes %     %


 


Monocytes %     %


 


Eosinophils %     %


 


Basophils %     %


 


Neutrophils #     (1.3-7.7)  k/uL


 


Lymphocytes #     (1.0-4.8)  k/uL


 


Monocytes #     (0-1.0)  k/uL


 


Eosinophils #     (0-0.7)  k/uL


 


Basophils #     (0-0.2)  k/uL


 


Manual Slide Review     


 


Hypochromasia     


 


Poikilocytosis (manual     


 


Anisocytosis     


 


Macrocytosis     


 


Target Cells     


 


Crenated Cell     


 


Fragmented RBCs     


 


PT     (9.0-12.0)  sec


 


INR     (<1.2)  


 


APTT     (22.0-30.0)  sec


 


Sample Site     


 


ABG pH     (7.35-7.45)  


 


ABG pCO2     (35-45)  mmHg


 


ABG pO2     ()  mmHg


 


ABG HCO3     (21-25)  mmol/L


 


ABG Total CO2     (19-24)  mmol/L


 


ABG O2 Saturation     (94-97)  %


 


ABG Base Excess     mmol/L


 


Bebo Test     


 


VBG pH     (7.31-7.41)  


 


VBG pCO2     (37-51)  mmHg


 


VBG HCO3     (24-28)  mmol/L


 


FiO2     %


 


Sodium     (137-145)  mmol/L


 


Potassium     (3.5-5.1)  mmol/L


 


Chloride     ()  mmol/L


 


Carbon Dioxide     (22-30)  mmol/L


 


Anion Gap     mmol/L


 


BUN     (7-17)  mg/dL


 


Creatinine     (0.52-1.04)  mg/dL


 


Est GFR (CKD-EPI)AfAm     (>60 ml/min/1.73 sqM)  


 


Est GFR (CKD-EPI)NonAf     (>60 ml/min/1.73 sqM)  


 


Glucose     (74-99)  mg/dL


 


POC Glucose (mg/dL)    174 H  (75-99)  mg/dL


 


POC Glu Operater ID    Misty Duggan  


 


Osmolality   382 H*   (280-301)  mosm/kg


 


Lactic Ac Sepsis Rflx     


 


Plasma Lactic Acid Scott     (0.7-2.0)  mmol/L


 


Calcium     (8.4-10.2)  mg/dL


 


Total Bilirubin     (0.2-1.3)  mg/dL


 


AST     (14-36)  U/L


 


ALT     (4-34)  U/L


 


Alkaline Phosphatase     ()  U/L


 


Ammonia     (<30)  umol/L


 


Creatine Kinase     ()  U/L


 


Troponin I     (0.000-0.034)  ng/mL


 


Total Protein     (6.3-8.2)  g/dL


 


Albumin     (3.5-5.0)  g/dL


 


Urine Color     


 


Urine Appearance     (Clear)  


 


Urine pH     (5.0-8.0)  


 


Ur Specific Gravity     (1.001-1.035)  


 


Urine Protein     (Negative)  


 


Urine Glucose (UA)     (Negative)  


 


Urine Ketones     (Negative)  


 


Urine Blood     (Negative)  


 


Urine Nitrite     (Negative)  


 


Urine Bilirubin     (Negative)  


 


Urine Urobilinogen     (<2.0)  mg/dL


 


Ur Leukocyte Esterase     (Negative)  


 


Urine RBC     (0-5)  /hpf


 


Urine WBC     (0-5)  /hpf


 


Urine WBC Clumps     (None)  /hpf


 


Amorphous Sediment     (None)  /hpf


 


Urine Bacteria     (None)  /hpf


 


Hyaline Casts     (0-2)  /lpf


 


Urine Mucus     (None)  /hpf


 


Urine HCG, Qual     (Not Detectd)  


 


Salicylates     mg/dL


 


Urine Opiates Screen     (NotDetected)  


 


Ur Oxycodone Screen     (NotDetected)  


 


Urine Methadone Screen     (NotDetected)  


 


Ur Propoxyphene Screen     (NotDetected)  


 


Acetaminophen     ug/mL


 


Ur Barbiturates Screen     (NotDetected)  


 


U Tricyclic Antidepress     (NotDetected)  


 


Ur Phencyclidine Scrn     (NotDetected)  


 


Ur Amphetamines Screen     (NotDetected)  


 


U Methamphetamines Scrn     (NotDetected)  


 


U Benzodiazepines Scrn     (NotDetected)  


 


Urine Cocaine Screen     (NotDetected)  


 


U Marijuana (THC) Screen     (NotDetected)  


 


Serum Alcohol     mg/dL


 


Ethyl Alcohol Screen  191 H    (Negative)  mg/dL


 


Methyl Alcohol, Qual  Negative    (Negative)  mg/dL


 


Isopropyl Alc, Qual  Negative    (Negative)  mg/dL


 


Acetone, Qual  Negative    (Negative)  mg/dL


 


Blood Type     


 


Blood Type Confirm     


 


Blood Type Recheck     


 


Bld Type Recheck Status     


 


Antibody Screen     


 


Crossmatch     


 


Spec Expiration Date     














  05/23/20 05/23/20 05/23/20 Range/Units





  12:15 12:15 12:15 


 


WBC   5.6   (3.8-10.6)  k/uL


 


RBC   2.05 L   (3.80-5.40)  m/uL


 


Hgb   6.3 L*   (11.4-16.0)  gm/dL


 


Hct   23.7 L   (34.0-46.0)  %


 


MCV   115.7 H   (80.0-100.0)  fL


 


MCH   30.7   (25.0-35.0)  pg


 


MCHC   26.5 L   (31.0-37.0)  g/dL


 


RDW   19.9 H   (11.5-15.5)  %


 


Plt Count   48 L   (150-450)  k/uL


 


Neutrophils %   69   %


 


Lymphocytes %   26   %


 


Monocytes %   4   %


 


Eosinophils %   1   %


 


Basophils %   0   %


 


Neutrophils #   3.8   (1.3-7.7)  k/uL


 


Lymphocytes #   1.4   (1.0-4.8)  k/uL


 


Monocytes #   0.2   (0-1.0)  k/uL


 


Eosinophils #   0.1   (0-0.7)  k/uL


 


Basophils #   0.0   (0-0.2)  k/uL


 


Manual Slide Review   Performed   


 


Hypochromasia   Marked   


 


Poikilocytosis (manual   Present   


 


Anisocytosis   Slight   


 


Macrocytosis   Marked A   


 


Target Cells   Present   


 


Crenated Cell   Present   


 


Fragmented RBCs   Present   


 


PT    16.9 H  (9.0-12.0)  sec


 


INR    1.7 H  (<1.2)  


 


APTT    42.3 H  (22.0-30.0)  sec


 


Sample Site     


 


ABG pH     (7.35-7.45)  


 


ABG pCO2     (35-45)  mmHg


 


ABG pO2     ()  mmHg


 


ABG HCO3     (21-25)  mmol/L


 


ABG Total CO2     (19-24)  mmol/L


 


ABG O2 Saturation     (94-97)  %


 


ABG Base Excess     mmol/L


 


Bebo Test     


 


VBG pH  6.84 L*    (7.31-7.41)  


 


VBG pCO2  25 L    (37-51)  mmHg


 


VBG HCO3  4 L*    (24-28)  mmol/L


 


FiO2     %


 


Sodium     (137-145)  mmol/L


 


Potassium     (3.5-5.1)  mmol/L


 


Chloride     ()  mmol/L


 


Carbon Dioxide     (22-30)  mmol/L


 


Anion Gap     mmol/L


 


BUN     (7-17)  mg/dL


 


Creatinine     (0.52-1.04)  mg/dL


 


Est GFR (CKD-EPI)AfAm     (>60 ml/min/1.73 sqM)  


 


Est GFR (CKD-EPI)NonAf     (>60 ml/min/1.73 sqM)  


 


Glucose     (74-99)  mg/dL


 


POC Glucose (mg/dL)     (75-99)  mg/dL


 


POC Glu Operater ID     


 


Osmolality     (280-301)  mosm/kg


 


Lactic Ac Sepsis Rflx     


 


Plasma Lactic Acid Scott     (0.7-2.0)  mmol/L


 


Calcium     (8.4-10.2)  mg/dL


 


Total Bilirubin     (0.2-1.3)  mg/dL


 


AST     (14-36)  U/L


 


ALT     (4-34)  U/L


 


Alkaline Phosphatase     ()  U/L


 


Ammonia     (<30)  umol/L


 


Creatine Kinase     ()  U/L


 


Troponin I     (0.000-0.034)  ng/mL


 


Total Protein     (6.3-8.2)  g/dL


 


Albumin     (3.5-5.0)  g/dL


 


Urine Color     


 


Urine Appearance     (Clear)  


 


Urine pH     (5.0-8.0)  


 


Ur Specific Gravity     (1.001-1.035)  


 


Urine Protein     (Negative)  


 


Urine Glucose (UA)     (Negative)  


 


Urine Ketones     (Negative)  


 


Urine Blood     (Negative)  


 


Urine Nitrite     (Negative)  


 


Urine Bilirubin     (Negative)  


 


Urine Urobilinogen     (<2.0)  mg/dL


 


Ur Leukocyte Esterase     (Negative)  


 


Urine RBC     (0-5)  /hpf


 


Urine WBC     (0-5)  /hpf


 


Urine WBC Clumps     (None)  /hpf


 


Amorphous Sediment     (None)  /hpf


 


Urine Bacteria     (None)  /hpf


 


Hyaline Casts     (0-2)  /lpf


 


Urine Mucus     (None)  /hpf


 


Urine HCG, Qual     (Not Detectd)  


 


Salicylates     mg/dL


 


Urine Opiates Screen     (NotDetected)  


 


Ur Oxycodone Screen     (NotDetected)  


 


Urine Methadone Screen     (NotDetected)  


 


Ur Propoxyphene Screen     (NotDetected)  


 


Acetaminophen     ug/mL


 


Ur Barbiturates Screen     (NotDetected)  


 


U Tricyclic Antidepress     (NotDetected)  


 


Ur Phencyclidine Scrn     (NotDetected)  


 


Ur Amphetamines Screen     (NotDetected)  


 


U Methamphetamines Scrn     (NotDetected)  


 


U Benzodiazepines Scrn     (NotDetected)  


 


Urine Cocaine Screen     (NotDetected)  


 


U Marijuana (THC) Screen     (NotDetected)  


 


Serum Alcohol     mg/dL


 


Ethyl Alcohol Screen     (Negative)  mg/dL


 


Methyl Alcohol, Qual     (Negative)  mg/dL


 


Isopropyl Alc, Qual     (Negative)  mg/dL


 


Acetone, Qual     (Negative)  mg/dL


 


Blood Type     


 


Blood Type Confirm     


 


Blood Type Recheck     


 


Bld Type Recheck Status     


 


Antibody Screen     


 


Crossmatch     


 


Spec Expiration Date     














  05/23/20 05/23/20 05/23/20 Range/Units





  12:15 12:15 12:15 


 


WBC     (3.8-10.6)  k/uL


 


RBC     (3.80-5.40)  m/uL


 


Hgb     (11.4-16.0)  gm/dL


 


Hct     (34.0-46.0)  %


 


MCV     (80.0-100.0)  fL


 


MCH     (25.0-35.0)  pg


 


MCHC     (31.0-37.0)  g/dL


 


RDW     (11.5-15.5)  %


 


Plt Count     (150-450)  k/uL


 


Neutrophils %     %


 


Lymphocytes %     %


 


Monocytes %     %


 


Eosinophils %     %


 


Basophils %     %


 


Neutrophils #     (1.3-7.7)  k/uL


 


Lymphocytes #     (1.0-4.8)  k/uL


 


Monocytes #     (0-1.0)  k/uL


 


Eosinophils #     (0-0.7)  k/uL


 


Basophils #     (0-0.2)  k/uL


 


Manual Slide Review     


 


Hypochromasia     


 


Poikilocytosis (manual     


 


Anisocytosis     


 


Macrocytosis     


 


Target Cells     


 


Crenated Cell     


 


Fragmented RBCs     


 


PT     (9.0-12.0)  sec


 


INR     (<1.2)  


 


APTT     (22.0-30.0)  sec


 


Sample Site     


 


ABG pH     (7.35-7.45)  


 


ABG pCO2     (35-45)  mmHg


 


ABG pO2     ()  mmHg


 


ABG HCO3     (21-25)  mmol/L


 


ABG Total CO2     (19-24)  mmol/L


 


ABG O2 Saturation     (94-97)  %


 


ABG Base Excess     mmol/L


 


Bebo Test     


 


VBG pH     (7.31-7.41)  


 


VBG pCO2     (37-51)  mmHg


 


VBG HCO3     (24-28)  mmol/L


 


FiO2     %


 


Sodium  143    (137-145)  mmol/L


 


Potassium  6.6 H*    (3.5-5.1)  mmol/L


 


Chloride  111 H    ()  mmol/L


 


Carbon Dioxide  <5 L*    (22-30)  mmol/L


 


Anion Gap      mmol/L


 


BUN  11    (7-17)  mg/dL


 


Creatinine  3.55 H    (0.52-1.04)  mg/dL


 


Est GFR (CKD-EPI)AfAm  17    (>60 ml/min/1.73 sqM)  


 


Est GFR (CKD-EPI)NonAf  15    (>60 ml/min/1.73 sqM)  


 


Glucose  167 H    (74-99)  mg/dL


 


POC Glucose (mg/dL)     (75-99)  mg/dL


 


POC Glu Operater ID     


 


Osmolality     (280-301)  mosm/kg


 


Lactic Ac Sepsis Rflx     


 


Plasma Lactic Acid Scott   20.4 H*   (0.7-2.0)  mmol/L


 


Calcium  7.5 L    (8.4-10.2)  mg/dL


 


Total Bilirubin  0.8    (0.2-1.3)  mg/dL


 


AST  178 H    (14-36)  U/L


 


ALT  43 H    (4-34)  U/L


 


Alkaline Phosphatase  215 H    ()  U/L


 


Ammonia   199 H   (<30)  umol/L


 


Creatine Kinase  374 H    ()  U/L


 


Troponin I    <0.012  (0.000-0.034)  ng/mL


 


Total Protein  5.3 L    (6.3-8.2)  g/dL


 


Albumin  2.5 L    (3.5-5.0)  g/dL


 


Urine Color     


 


Urine Appearance     (Clear)  


 


Urine pH     (5.0-8.0)  


 


Ur Specific Gravity     (1.001-1.035)  


 


Urine Protein     (Negative)  


 


Urine Glucose (UA)     (Negative)  


 


Urine Ketones     (Negative)  


 


Urine Blood     (Negative)  


 


Urine Nitrite     (Negative)  


 


Urine Bilirubin     (Negative)  


 


Urine Urobilinogen     (<2.0)  mg/dL


 


Ur Leukocyte Esterase     (Negative)  


 


Urine RBC     (0-5)  /hpf


 


Urine WBC     (0-5)  /hpf


 


Urine WBC Clumps     (None)  /hpf


 


Amorphous Sediment     (None)  /hpf


 


Urine Bacteria     (None)  /hpf


 


Hyaline Casts     (0-2)  /lpf


 


Urine Mucus     (None)  /hpf


 


Urine HCG, Qual     (Not Detectd)  


 


Salicylates  1.0    mg/dL


 


Urine Opiates Screen     (NotDetected)  


 


Ur Oxycodone Screen     (NotDetected)  


 


Urine Methadone Screen     (NotDetected)  


 


Ur Propoxyphene Screen     (NotDetected)  


 


Acetaminophen  11.5    ug/mL


 


Ur Barbiturates Screen     (NotDetected)  


 


U Tricyclic Antidepress     (NotDetected)  


 


Ur Phencyclidine Scrn     (NotDetected)  


 


Ur Amphetamines Screen     (NotDetected)  


 


U Methamphetamines Scrn     (NotDetected)  


 


U Benzodiazepines Scrn     (NotDetected)  


 


Urine Cocaine Screen     (NotDetected)  


 


U Marijuana (THC) Screen     (NotDetected)  


 


Serum Alcohol  274 H*    mg/dL


 


Ethyl Alcohol Screen     (Negative)  mg/dL


 


Methyl Alcohol, Qual     (Negative)  mg/dL


 


Isopropyl Alc, Qual     (Negative)  mg/dL


 


Acetone, Qual     (Negative)  mg/dL


 


Blood Type     


 


Blood Type Confirm     


 


Blood Type Recheck     


 


Bld Type Recheck Status     


 


Antibody Screen     


 


Crossmatch     


 


Spec Expiration Date     














  05/23/20 05/23/20 05/23/20 Range/Units





  12:15 13:00 13:10 


 


WBC     (3.8-10.6)  k/uL


 


RBC     (3.80-5.40)  m/uL


 


Hgb     (11.4-16.0)  gm/dL


 


Hct     (34.0-46.0)  %


 


MCV     (80.0-100.0)  fL


 


MCH     (25.0-35.0)  pg


 


MCHC     (31.0-37.0)  g/dL


 


RDW     (11.5-15.5)  %


 


Plt Count     (150-450)  k/uL


 


Neutrophils %     %


 


Lymphocytes %     %


 


Monocytes %     %


 


Eosinophils %     %


 


Basophils %     %


 


Neutrophils #     (1.3-7.7)  k/uL


 


Lymphocytes #     (1.0-4.8)  k/uL


 


Monocytes #     (0-1.0)  k/uL


 


Eosinophils #     (0-0.7)  k/uL


 


Basophils #     (0-0.2)  k/uL


 


Manual Slide Review     


 


Hypochromasia     


 


Poikilocytosis (manual     


 


Anisocytosis     


 


Macrocytosis     


 


Target Cells     


 


Crenated Cell     


 


Fragmented RBCs     


 


PT     (9.0-12.0)  sec


 


INR     (<1.2)  


 


APTT     (22.0-30.0)  sec


 


Sample Site     


 


ABG pH     (7.35-7.45)  


 


ABG pCO2     (35-45)  mmHg


 


ABG pO2     ()  mmHg


 


ABG HCO3     (21-25)  mmol/L


 


ABG Total CO2     (19-24)  mmol/L


 


ABG O2 Saturation     (94-97)  %


 


ABG Base Excess     mmol/L


 


Bebo Test     


 


VBG pH     (7.31-7.41)  


 


VBG pCO2     (37-51)  mmHg


 


VBG HCO3     (24-28)  mmol/L


 


FiO2     %


 


Sodium     (137-145)  mmol/L


 


Potassium     (3.5-5.1)  mmol/L


 


Chloride     ()  mmol/L


 


Carbon Dioxide     (22-30)  mmol/L


 


Anion Gap     mmol/L


 


BUN     (7-17)  mg/dL


 


Creatinine     (0.52-1.04)  mg/dL


 


Est GFR (CKD-EPI)AfAm     (>60 ml/min/1.73 sqM)  


 


Est GFR (CKD-EPI)NonAf     (>60 ml/min/1.73 sqM)  


 


Glucose     (74-99)  mg/dL


 


POC Glucose (mg/dL)     (75-99)  mg/dL


 


POC Glu Operater ID     


 


Osmolality     (280-301)  mosm/kg


 


Lactic Ac Sepsis Rflx   Y   


 


Plasma Lactic Acid Scott     (0.7-2.0)  mmol/L


 


Calcium     (8.4-10.2)  mg/dL


 


Total Bilirubin     (0.2-1.3)  mg/dL


 


AST     (14-36)  U/L


 


ALT     (4-34)  U/L


 


Alkaline Phosphatase     ()  U/L


 


Ammonia     (<30)  umol/L


 


Creatine Kinase     ()  U/L


 


Troponin I     (0.000-0.034)  ng/mL


 


Total Protein     (6.3-8.2)  g/dL


 


Albumin     (3.5-5.0)  g/dL


 


Urine Color     


 


Urine Appearance     (Clear)  


 


Urine pH     (5.0-8.0)  


 


Ur Specific Gravity     (1.001-1.035)  


 


Urine Protein     (Negative)  


 


Urine Glucose (UA)     (Negative)  


 


Urine Ketones     (Negative)  


 


Urine Blood     (Negative)  


 


Urine Nitrite     (Negative)  


 


Urine Bilirubin     (Negative)  


 


Urine Urobilinogen     (<2.0)  mg/dL


 


Ur Leukocyte Esterase     (Negative)  


 


Urine RBC     (0-5)  /hpf


 


Urine WBC     (0-5)  /hpf


 


Urine WBC Clumps     (None)  /hpf


 


Amorphous Sediment     (None)  /hpf


 


Urine Bacteria     (None)  /hpf


 


Hyaline Casts     (0-2)  /lpf


 


Urine Mucus     (None)  /hpf


 


Urine HCG, Qual     (Not Detectd)  


 


Salicylates     mg/dL


 


Urine Opiates Screen     (NotDetected)  


 


Ur Oxycodone Screen     (NotDetected)  


 


Urine Methadone Screen     (NotDetected)  


 


Ur Propoxyphene Screen     (NotDetected)  


 


Acetaminophen     ug/mL


 


Ur Barbiturates Screen     (NotDetected)  


 


U Tricyclic Antidepress     (NotDetected)  


 


Ur Phencyclidine Scrn     (NotDetected)  


 


Ur Amphetamines Screen     (NotDetected)  


 


U Methamphetamines Scrn     (NotDetected)  


 


U Benzodiazepines Scrn     (NotDetected)  


 


Urine Cocaine Screen     (NotDetected)  


 


U Marijuana (THC) Screen     (NotDetected)  


 


Serum Alcohol     mg/dL


 


Ethyl Alcohol Screen     (Negative)  mg/dL


 


Methyl Alcohol, Qual     (Negative)  mg/dL


 


Isopropyl Alc, Qual     (Negative)  mg/dL


 


Acetone, Qual     (Negative)  mg/dL


 


Blood Type  B Positive    


 


Blood Type Confirm    B Positive  


 


Blood Type Recheck  No Previous Record    


 


Bld Type Recheck Status  CABO Indicated    


 


Antibody Screen  NEGATIVE    


 


Crossmatch  See Detail    


 


Spec Expiration Date  05/26/2020 - 2315 05/23/20 05/23/20 05/23/20 Range/Units





  13:25 14:22 14:22 


 


WBC     (3.8-10.6)  k/uL


 


RBC     (3.80-5.40)  m/uL


 


Hgb     (11.4-16.0)  gm/dL


 


Hct     (34.0-46.0)  %


 


MCV     (80.0-100.0)  fL


 


MCH     (25.0-35.0)  pg


 


MCHC     (31.0-37.0)  g/dL


 


RDW     (11.5-15.5)  %


 


Plt Count     (150-450)  k/uL


 


Neutrophils %     %


 


Lymphocytes %     %


 


Monocytes %     %


 


Eosinophils %     %


 


Basophils %     %


 


Neutrophils #     (1.3-7.7)  k/uL


 


Lymphocytes #     (1.0-4.8)  k/uL


 


Monocytes #     (0-1.0)  k/uL


 


Eosinophils #     (0-0.7)  k/uL


 


Basophils #     (0-0.2)  k/uL


 


Manual Slide Review     


 


Hypochromasia     


 


Poikilocytosis (manual     


 


Anisocytosis     


 


Macrocytosis     


 


Target Cells     


 


Crenated Cell     


 


Fragmented RBCs     


 


PT     (9.0-12.0)  sec


 


INR     (<1.2)  


 


APTT     (22.0-30.0)  sec


 


Sample Site  RT BRACHIAL    


 


ABG pH  6.81 L*    (7.35-7.45)  


 


ABG pCO2  22 L    (35-45)  mmHg


 


ABG pO2  >400 H    ()  mmHg


 


ABG HCO3  4 L*    (21-25)  mmol/L


 


ABG Total CO2  4 L    (19-24)  mmol/L


 


ABG O2 Saturation  99.3 H    (94-97)  %


 


ABG Base Excess  -30.8    mmol/L


 


Bebo Test  Yes    


 


VBG pH     (7.31-7.41)  


 


VBG pCO2     (37-51)  mmHg


 


VBG HCO3     (24-28)  mmol/L


 


FiO2  100    %


 


Sodium     (137-145)  mmol/L


 


Potassium     (3.5-5.1)  mmol/L


 


Chloride     ()  mmol/L


 


Carbon Dioxide     (22-30)  mmol/L


 


Anion Gap     mmol/L


 


BUN     (7-17)  mg/dL


 


Creatinine     (0.52-1.04)  mg/dL


 


Est GFR (CKD-EPI)AfAm     (>60 ml/min/1.73 sqM)  


 


Est GFR (CKD-EPI)NonAf     (>60 ml/min/1.73 sqM)  


 


Glucose     (74-99)  mg/dL


 


POC Glucose (mg/dL)     (75-99)  mg/dL


 


POC Glu Operater ID     


 


Osmolality     (280-301)  mosm/kg


 


Lactic Ac Sepsis Rflx     


 


Plasma Lactic Acid Scott     (0.7-2.0)  mmol/L


 


Calcium     (8.4-10.2)  mg/dL


 


Total Bilirubin     (0.2-1.3)  mg/dL


 


AST     (14-36)  U/L


 


ALT     (4-34)  U/L


 


Alkaline Phosphatase     ()  U/L


 


Ammonia     (<30)  umol/L


 


Creatine Kinase     ()  U/L


 


Troponin I     (0.000-0.034)  ng/mL


 


Total Protein     (6.3-8.2)  g/dL


 


Albumin     (3.5-5.0)  g/dL


 


Urine Color    Yellow  


 


Urine Appearance    Turbid H  (Clear)  


 


Urine pH    6.0  (5.0-8.0)  


 


Ur Specific Gravity    1.020  (1.001-1.035)  


 


Urine Protein    2+ H  (Negative)  


 


Urine Glucose (UA)    Trace H  (Negative)  


 


Urine Ketones    Negative  (Negative)  


 


Urine Blood    Moderate H  (Negative)  


 


Urine Nitrite    Negative  (Negative)  


 


Urine Bilirubin    Negative  (Negative)  


 


Urine Urobilinogen    <2.0  (<2.0)  mg/dL


 


Ur Leukocyte Esterase    Large H  (Negative)  


 


Urine RBC    82 H  (0-5)  /hpf


 


Urine WBC    >182 H  (0-5)  /hpf


 


Urine WBC Clumps    Many H  (None)  /hpf


 


Amorphous Sediment    Rare H  (None)  /hpf


 


Urine Bacteria    Many H  (None)  /hpf


 


Hyaline Casts    73 H  (0-2)  /lpf


 


Urine Mucus    Many H  (None)  /hpf


 


Urine HCG, Qual     (Not Detectd)  


 


Salicylates     mg/dL


 


Urine Opiates Screen   Not Detected   (NotDetected)  


 


Ur Oxycodone Screen   Not Detected   (NotDetected)  


 


Urine Methadone Screen   Not Detected   (NotDetected)  


 


Ur Propoxyphene Screen   Not Detected   (NotDetected)  


 


Acetaminophen     ug/mL


 


Ur Barbiturates Screen   Not Detected   (NotDetected)  


 


U Tricyclic Antidepress   Not Detected   (NotDetected)  


 


Ur Phencyclidine Scrn   Not Detected   (NotDetected)  


 


Ur Amphetamines Screen   Not Detected   (NotDetected)  


 


U Methamphetamines Scrn   Not Detected   (NotDetected)  


 


U Benzodiazepines Scrn   Not Detected   (NotDetected)  


 


Urine Cocaine Screen   Not Detected   (NotDetected)  


 


U Marijuana (THC) Screen   Not Detected   (NotDetected)  


 


Serum Alcohol     mg/dL


 


Ethyl Alcohol Screen     (Negative)  mg/dL


 


Methyl Alcohol, Qual     (Negative)  mg/dL


 


Isopropyl Alc, Qual     (Negative)  mg/dL


 


Acetone, Qual     (Negative)  mg/dL


 


Blood Type     


 


Blood Type Confirm     


 


Blood Type Recheck     


 


Bld Type Recheck Status     


 


Antibody Screen     


 


Crossmatch     


 


Spec Expiration Date     














  05/23/20 05/23/20 Range/Units





  14:22 14:29 


 


WBC    (3.8-10.6)  k/uL


 


RBC    (3.80-5.40)  m/uL


 


Hgb    (11.4-16.0)  gm/dL


 


Hct    (34.0-46.0)  %


 


MCV    (80.0-100.0)  fL


 


MCH    (25.0-35.0)  pg


 


MCHC    (31.0-37.0)  g/dL


 


RDW    (11.5-15.5)  %


 


Plt Count    (150-450)  k/uL


 


Neutrophils %    %


 


Lymphocytes %    %


 


Monocytes %    %


 


Eosinophils %    %


 


Basophils %    %


 


Neutrophils #    (1.3-7.7)  k/uL


 


Lymphocytes #    (1.0-4.8)  k/uL


 


Monocytes #    (0-1.0)  k/uL


 


Eosinophils #    (0-0.7)  k/uL


 


Basophils #    (0-0.2)  k/uL


 


Manual Slide Review    


 


Hypochromasia    


 


Poikilocytosis (manual    


 


Anisocytosis    


 


Macrocytosis    


 


Target Cells    


 


Crenated Cell    


 


Fragmented RBCs    


 


PT    (9.0-12.0)  sec


 


INR    (<1.2)  


 


APTT    (22.0-30.0)  sec


 


Sample Site    


 


ABG pH    (7.35-7.45)  


 


ABG pCO2    (35-45)  mmHg


 


ABG pO2    ()  mmHg


 


ABG HCO3    (21-25)  mmol/L


 


ABG Total CO2    (19-24)  mmol/L


 


ABG O2 Saturation    (94-97)  %


 


ABG Base Excess    mmol/L


 


Bebo Test    


 


VBG pH    (7.31-7.41)  


 


VBG pCO2    (37-51)  mmHg


 


VBG HCO3    (24-28)  mmol/L


 


FiO2    %


 


Sodium    (137-145)  mmol/L


 


Potassium    (3.5-5.1)  mmol/L


 


Chloride    ()  mmol/L


 


Carbon Dioxide    (22-30)  mmol/L


 


Anion Gap    mmol/L


 


BUN    (7-17)  mg/dL


 


Creatinine    (0.52-1.04)  mg/dL


 


Est GFR (CKD-EPI)AfAm    (>60 ml/min/1.73 sqM)  


 


Est GFR (CKD-EPI)NonAf    (>60 ml/min/1.73 sqM)  


 


Glucose    (74-99)  mg/dL


 


POC Glucose (mg/dL)   210 H  (75-99)  mg/dL


 


POC Glu Operater ID   Delaney Patel  


 


Osmolality    (280-301)  mosm/kg


 


Lactic Ac Sepsis Rflx    


 


Plasma Lactic Acid Scott    (0.7-2.0)  mmol/L


 


Calcium    (8.4-10.2)  mg/dL


 


Total Bilirubin    (0.2-1.3)  mg/dL


 


AST    (14-36)  U/L


 


ALT    (4-34)  U/L


 


Alkaline Phosphatase    ()  U/L


 


Ammonia    (<30)  umol/L


 


Creatine Kinase    ()  U/L


 


Troponin I    (0.000-0.034)  ng/mL


 


Total Protein    (6.3-8.2)  g/dL


 


Albumin    (3.5-5.0)  g/dL


 


Urine Color    


 


Urine Appearance    (Clear)  


 


Urine pH    (5.0-8.0)  


 


Ur Specific Gravity    (1.001-1.035)  


 


Urine Protein    (Negative)  


 


Urine Glucose (UA)    (Negative)  


 


Urine Ketones    (Negative)  


 


Urine Blood    (Negative)  


 


Urine Nitrite    (Negative)  


 


Urine Bilirubin    (Negative)  


 


Urine Urobilinogen    (<2.0)  mg/dL


 


Ur Leukocyte Esterase    (Negative)  


 


Urine RBC    (0-5)  /hpf


 


Urine WBC    (0-5)  /hpf


 


Urine WBC Clumps    (None)  /hpf


 


Amorphous Sediment    (None)  /hpf


 


Urine Bacteria    (None)  /hpf


 


Hyaline Casts    (0-2)  /lpf


 


Urine Mucus    (None)  /hpf


 


Urine HCG, Qual  Not Detected   (Not Detectd)  


 


Salicylates    mg/dL


 


Urine Opiates Screen    (NotDetected)  


 


Ur Oxycodone Screen    (NotDetected)  


 


Urine Methadone Screen    (NotDetected)  


 


Ur Propoxyphene Screen    (NotDetected)  


 


Acetaminophen    ug/mL


 


Ur Barbiturates Screen    (NotDetected)  


 


U Tricyclic Antidepress    (NotDetected)  


 


Ur Phencyclidine Scrn    (NotDetected)  


 


Ur Amphetamines Screen    (NotDetected)  


 


U Methamphetamines Scrn    (NotDetected)  


 


U Benzodiazepines Scrn    (NotDetected)  


 


Urine Cocaine Screen    (NotDetected)  


 


U Marijuana (THC) Screen    (NotDetected)  


 


Serum Alcohol    mg/dL


 


Ethyl Alcohol Screen    (Negative)  mg/dL


 


Methyl Alcohol, Qual    (Negative)  mg/dL


 


Isopropyl Alc, Qual    (Negative)  mg/dL


 


Acetone, Qual    (Negative)  mg/dL


 


Blood Type    


 


Blood Type Confirm    


 


Blood Type Recheck    


 


Bld Type Recheck Status    


 


Antibody Screen    


 


Crossmatch    


 


Spec Expiration Date    














Critical Care Time


Critical Care Time: Yes


Critical Care Time: 





76 minutes





Disposition


Clinical Impression: 


 Alcoholic intoxication, Altered mental status, Hypoglycemia, Hypothermia, JACE 

(acute kidney injury), Lactic acidosis, Anemia, Cardiopulmonary arrest, UTI 

(urinary tract infection), Ventilator dependent, Hyperkalemia, 

Thrombocytopathia, Hypotension





Disposition: ADMITTED AS IP TO THIS [facility]


Condition: Critical


Is patient prescribed a controlled substance at d/c from ED?: No


Decision to Admit Reason: Admit from EC


Decision Date: 05/23/20


Decision Time: 15:44

## 2020-05-23 NOTE — P.CNPUL
History of Present Illness


Consult date: 05/23/20


Requesting physician: Ochoa Avery


Reason for consult: other


Chief complaint: Cardiac arrest


History of present illness: 





This is a 49-year-old -American female, no available past medical history

on this patient, patient was brought into the emergency room by EMS when the 

patient was noted by a boyfriend unresponsive.  Patient is known to have history

of alcohol abuse, she is also known to have history of depression, normally 

takes trazodone.  According to the boyfriend, around 8:30 AM, patient was found 

unresponsive.  Upon EMS arrival patient was noted to have low sugars, and she 

was given an amp of dextrose.  She did wake up a bit, however remained 

lethargic, and on her weight in the ambulance to the ER patient was yelling and 

getting restless and agitated.  Upon arrival to the ER, patient was not follow 

any instructions.  Patient was evaluated, initially she was able to protect her 

airways, she would open her eyes and look around to tactile stimuli.  After IV 

access, and after basic labs were ordered, patient was noted to 12-lead EKG 

showed mostly normal sinus rhythm rate of 81/m.  She had peak T waves in V2 

through V4 and the patient was sent for CT of the brain because of altered 

mental status.  Upon returning from CT to the trauma bay, patient became hypot

ensive, levo fed was started for hypotension and bradycardia then she went into 

asystole.  CPR was started, epinephrine was given, patient was intubated by the 

ER physician, and she had one round of chest compressions.  In the meantime 

patient was intubated, and her endotracheal tube was noted to be initially in 

the right mainstem bronchus, and this was retracted a bit.  Orogastric tube was 

placed, and she was noted to have 75 mL of bright blood in the orogastric tube. 

Patient continued to require levo fed, and she was given fluid boluses for 

hypotension.  Venous blood gases showed very low pH of 6.8, pCO2 was 25, and 

bicarb was 4.  Sodium bicarb was given.  Patient was noted to have elevated 

potassium, hence 10 units of insulin were given along with 1 amp of dextrose.  

Patient received 2 L of fluid boluses.  And labs came back showing a hemoglobin 

of 6.3.  Platelets were also noted to be low at 48,000.  INR was 1.7.  

Creatinine 3.5.  And her liver enzymes were elevated.  Ammonia level was also 

noted to be 199.  Her serum alcohol was 274.  Patient was placed on a bicarb 

drip, she was placed empirically on antibiotics, and she was also placed on 

lactulose.  CT of the brain showed no evidence of intracranial hemorrhage or 

mass effect.  CT of the abdomen and pelvis was abnormal showing severe 

pancolitis with mesenteric edema and small volume ascites.  The radiologist felt

this could be consistent with ischemic bowel.  Patient was also noted to have 

severe degree of hepatic steatosis.  And bibasilar airspace disease/atelectasis.

 Patient remained hypotensive requiring more norepinephrine, and vasopressin was

added.  Surgical consultation was initiated, however considering the patient's 

clinical presentation and hypotension requiring significant amount of 

vasopressin and norepinephrine, and considering the patient was extremely 

acidotic, her lactic acid was as high as 20.  Patient felt to be not a surgical 

candidate.  And she needed to be more resuscitated before surgery could even be 

considered.  Patient was also placed on Sandostatin.  Rocephin was given in the 

ER, and I have recommended Flagyl and Zosyn.  I came in to see the patient in 

the ICU, a left brachial arterial line was placed, and recommended further labs 

to be ordered including ABG, coagulation profile, and CBC.  These are presently 

pending.  In the meantime patient received a total of 2 units of packed RBCs, 2 

units of fresh frozen plasma, and 1 unit of platelets.  Drug screen was 

basically negative except she had acetaminophen level of 11.5.  Salicylate 1.0, 

and serum alcohol level was 274.  





Review of Systems


ROS unobtainable: due to endotracheal tube





Past Medical History


Past Medical History: Unable to Obtain


History of Any Multi-Drug Resistant Organisms: Unobtainable


Past Surgical History: Unable to Obtain


Past Psychological History: Unable to Obtain


Smoking Status: Unknown if ever smoked


Past Alcohol Use History: Unable to Obtain


Past Drug Use History: Unable to Obtain





Medications and Allergies


                                Home Medications











 Medication  Instructions  Recorded  Confirmed  Type


 


Unable To Assess [Unable to Assess]  05/23/20 05/23/20 History








                                    Allergies











Allergy/AdvReac Type Severity Reaction Status Date / Time


 


Unable to Assess Allergy   Verified 05/23/20 12:38














Physical Exam


Vitals: 


                                   Vital Signs











  Temp Pulse Pulse Resp BP BP Pulse Ox


 


 05/23/20 21:24  94.5 F L  103 H   29 H  96/50   99


 


 05/23/20 21:15   104 H   26 H  103/53   99


 


 05/23/20 21:07  94.5 F L  102 H   26 H  103/53   98


 


 05/23/20 21:00  93.9 F L  103 H   24  107/54   99


 


 05/23/20 20:57  34.4 F L  103 H   26 H  100/52   99


 


 05/23/20 20:45  34.6 F L  103 H   24  104/49   98


 


 05/23/20 20:30   102 H   24  92/50   99


 


 05/23/20 20:27  94.4 F L  105 H   24  99/50  


 


 05/23/20 20:15  34.1 F L  103 H   22  92/49   99


 


 05/23/20 20:05  92.8 F L  101 H   22  92/49   100


 


 05/23/20 20:00  92.8 F L  111 H   25 H  85/48   99


 


 05/23/20 19:57  92.8 F L  102 H   20  94/57   100


 


 05/23/20 19:45   92   20  84/46   99


 


 05/23/20 19:30   91   27 H  84/46   99


 


 05/23/20 19:27  92.8 F L  103 H   20  85/48   100


 


 05/23/20 19:17  92.8 F L  105 H   21  84/46   98


 


 05/23/20 19:15   89   25 H  76/44   99


 


 05/23/20 19:00   87   19  80/44   99


 


 05/23/20 18:45   89   17  91/52   99


 


 05/23/20 18:30   92   9 L  94/54   99


 


 05/23/20 18:15   93   11 L  120/66   99


 


 05/23/20 17:40  94.2 F L  102 H   14  128/67  


 


 05/23/20 17:30   101 H   23  136/69   100


 


 05/23/20 17:20   100   11 L  121/71   100


 


 05/23/20 17:10   105 H   22  135/72   100


 


 05/23/20 17:00   104 H   18  128/72   99


 


 05/23/20 16:50   105 H   16  132/70   100


 


 05/23/20 16:40   105 H   14  136/75   100


 


 05/23/20 16:30   108 H   19  150/80   100


 


 05/23/20 16:20   109 H   18  144/80   100


 


 05/23/20 16:10   108 H   18  140/77   100


 


 05/23/20 16:00   106 H   18  126/78   100


 


 05/23/20 15:50   103 H   20  128/76   100


 


 05/23/20 15:40   101 H   14  116/73   100


 


 05/23/20 15:39  90 F L  100   17  116/73  


 


 05/23/20 15:30   105 H   15  115/82   100


 


 05/23/20 15:20   98   13  106/60   100


 


 05/23/20 15:15  90 F L  101 H   17  111/65   100


 


 05/23/20 15:10   96   16  102/46   100


 


 05/23/20 15:05   98   16  107/43   100


 


 05/23/20 15:01  90 F L  96   17  106/52  


 


 05/23/20 15:00   95   17  92/46   100


 


 05/23/20 14:50   84   14  96/49   100


 


 05/23/20 14:40   89   20  90/45   100


 


 05/23/20 14:30      96/45  


 


 05/23/20 14:20   88   16  97/45   100


 


 05/23/20 14:10   85   15  93/44   100


 


 05/23/20 14:02   85   17  88/40   100


 


 05/23/20 13:26  90.1 F L   92  14   93/44  100


 


 05/23/20 12:38   80   22  65/37   96








                                Intake and Output











 05/23/20 05/23/20 05/23/20





 06:59 14:59 22:59


 


Intake Total  1.053 1267.327


 


Output Total  10 45


 


Balance  -8.947 1222.327


 


Intake:   


 


  IV   400


 


    Dextrose 5% in Water 1,   400





    000 ml @ 150 mls/hr IV .   





    Q7H40M PATRICIA with Sodium   





    Bicarb (1 Meq/ml) 150 ml   





    Rx#:[id number]   


 


  Intake, IV Titration  1.053 33.327





  Amount   


 


    Midazolam HCl 50 mg In   6.25





    Sodium Chloride 0.9% 40   





    ml @ 1 MG/HR 1 mls/hr IV   





    .Q24H PATRICIA Rx#:[id number]   


 


    Norepinephrine 32 mg In  1.053 27.077





    Sodium Chloride 0.9% 218   





    ml @ 0.05 MCG/KG/MIN 1.   





    914 mls/hr IV .Q24H ONE   





    Rx#:[id number]   


 


  Blood Product   834


 


    Ffp 24 Cpd  Unit   0





    [id number]   


 


    Platelet Irr Pheresis 2   214





    Acda  Unit [id number]   


 


     As-1  Unit   310





    [id number]   


 


     As-3  Unit   310





    [id number]   


 


Output:   


 


  Urine  10 45


 


    Uretheral (Foster)  10 


 


Other:   


 


  Weight  81.647 kg 














Physical Exam: Revealed a 49-year-old -American female, intubated, with

draws to painful stimuli, presently on Versed drip.


Head: Atraumatic, normocephalic, endotracheal tube and orogastric tube are 

intact.


HEENT:[Neck is supple.] [No neck masses.] [No thyromegaly.] [No JVD.]  Moist 

mucous membranes.  PERRLA, EOMI, no icterus.


Chest: [Symmetrical chest expansion, crackles at the bases, no rhonchi and no 

wheezes..]


Cardiac Exam: [Normal S1 and S2, no S3 gallop, no murmur.]


Abdomen: [Soft, distended, nontender,  no megaly, no rebound, no guarding, no 

bowel sounds.


Extremities: [No clubbing, no edema, no cyanosis.]


Neurological Exam: Patient is intubated, sedated, unable to assess.


Psychiatric: Could not be assessed.





Results





- Laboratory Findings


CBC and BMP: 


                                 05/23/20 22:25





                                 05/23/20 12:15


ABG











ABG pH  7.12  (7.35-7.45)  L*  05/23/20  20:46    


 


ABG pCO2  22 mmHg (35-45)  L  05/23/20  20:46    


 


ABG pO2  122 mmHg ()  H  05/23/20  20:46    


 


ABG O2 Saturation  98.1 % (94-97)  H  05/23/20  20:46    





PT/INR, D-dimer











PT  14.5 sec (9.0-12.0)  H  05/23/20  22:25    


 


INR  1.5  (<1.2)  H  05/23/20  22:25    








Abnormal lab findings: 


                                  Abnormal Labs











  05/23/20 05/23/20 05/23/20





  08:18 08:18 12:14


 


RBC   


 


Hgb   


 


Hct   


 


MCV   


 


MCHC   


 


RDW   


 


Plt Count   


 


Macrocytosis   


 


PT   


 


INR   


 


APTT   


 


ABG pH   


 


ABG pCO2   


 


ABG pO2   


 


ABG HCO3   


 


ABG Total CO2   


 


ABG O2 Saturation   


 


VBG pH   


 


VBG pCO2   


 


VBG HCO3   


 


Potassium   


 


Chloride   


 


Carbon Dioxide   


 


Creatinine   


 


Glucose   


 


POC Glucose (mg/dL)    174 H


 


Osmolality  382 H*  


 


Plasma Lactic Acid Scott   18.1 H* 


 


Calcium   


 


AST   


 


ALT   


 


Alkaline Phosphatase   


 


Ammonia   


 


Creatine Kinase   


 


Total Protein   


 


Albumin   


 


Urine Appearance   


 


Urine Protein   


 


Urine Glucose (UA)   


 


Urine Blood   


 


Ur Leukocyte Esterase   


 


Urine RBC   


 


Urine WBC   


 


Urine WBC Clumps   


 


Amorphous Sediment   


 


Urine Bacteria   


 


Hyaline Casts   


 


Urine Mucus   


 


Serum Alcohol   


 


Crossmatch   














  05/23/20 05/23/20 05/23/20





  12:15 12:15 12:15


 


RBC   2.05 L 


 


Hgb   6.3 L* 


 


Hct   23.7 L 


 


MCV   115.7 H 


 


MCHC   26.5 L 


 


RDW   19.9 H 


 


Plt Count   48 L 


 


Macrocytosis   Marked A 


 


PT    16.9 H


 


INR    1.7 H


 


APTT    42.3 H


 


ABG pH   


 


ABG pCO2   


 


ABG pO2   


 


ABG HCO3   


 


ABG Total CO2   


 


ABG O2 Saturation   


 


VBG pH  6.84 L*  


 


VBG pCO2  25 L  


 


VBG HCO3  4 L*  


 


Potassium   


 


Chloride   


 


Carbon Dioxide   


 


Creatinine   


 


Glucose   


 


POC Glucose (mg/dL)   


 


Osmolality   


 


Plasma Lactic Acid Scott   


 


Calcium   


 


AST   


 


ALT   


 


Alkaline Phosphatase   


 


Ammonia   


 


Creatine Kinase   


 


Total Protein   


 


Albumin   


 


Urine Appearance   


 


Urine Protein   


 


Urine Glucose (UA)   


 


Urine Blood   


 


Ur Leukocyte Esterase   


 


Urine RBC   


 


Urine WBC   


 


Urine WBC Clumps   


 


Amorphous Sediment   


 


Urine Bacteria   


 


Hyaline Casts   


 


Urine Mucus   


 


Serum Alcohol   


 


Crossmatch   














  05/23/20 05/23/20 05/23/20





  12:15 12:15 12:15


 


RBC   


 


Hgb   


 


Hct   


 


MCV   


 


MCHC   


 


RDW   


 


Plt Count   


 


Macrocytosis   


 


PT   


 


INR   


 


APTT   


 


ABG pH   


 


ABG pCO2   


 


ABG pO2   


 


ABG HCO3   


 


ABG Total CO2   


 


ABG O2 Saturation   


 


VBG pH   


 


VBG pCO2   


 


VBG HCO3   


 


Potassium  6.6 H*  


 


Chloride  111 H  


 


Carbon Dioxide  <5 L*  


 


Creatinine  3.55 H  


 


Glucose  167 H  


 


POC Glucose (mg/dL)   


 


Osmolality   


 


Plasma Lactic Acid Scott   20.4 H* 


 


Calcium  7.5 L  


 


AST  178 H  


 


ALT  43 H  


 


Alkaline Phosphatase  215 H  


 


Ammonia   199 H 


 


Creatine Kinase  374 H  


 


Total Protein  5.3 L  


 


Albumin  2.5 L  


 


Urine Appearance   


 


Urine Protein   


 


Urine Glucose (UA)   


 


Urine Blood   


 


Ur Leukocyte Esterase   


 


Urine RBC   


 


Urine WBC   


 


Urine WBC Clumps   


 


Amorphous Sediment   


 


Urine Bacteria   


 


Hyaline Casts   


 


Urine Mucus   


 


Serum Alcohol  274 H*  


 


Crossmatch    See Detail














  05/23/20 05/23/20 05/23/20





  13:25 14:22 14:29


 


RBC   


 


Hgb   


 


Hct   


 


MCV   


 


MCHC   


 


RDW   


 


Plt Count   


 


Macrocytosis   


 


PT   


 


INR   


 


APTT   


 


ABG pH  6.81 L*  


 


ABG pCO2  22 L  


 


ABG pO2  >400 H  


 


ABG HCO3  4 L*  


 


ABG Total CO2  4 L  


 


ABG O2 Saturation  99.3 H  


 


VBG pH   


 


VBG pCO2   


 


VBG HCO3   


 


Potassium   


 


Chloride   


 


Carbon Dioxide   


 


Creatinine   


 


Glucose   


 


POC Glucose (mg/dL)    210 H


 


Osmolality   


 


Plasma Lactic Acid Scott   


 


Calcium   


 


AST   


 


ALT   


 


Alkaline Phosphatase   


 


Ammonia   


 


Creatine Kinase   


 


Total Protein   


 


Albumin   


 


Urine Appearance   Turbid H 


 


Urine Protein   2+ H 


 


Urine Glucose (UA)   Trace H 


 


Urine Blood   Moderate H 


 


Ur Leukocyte Esterase   Large H 


 


Urine RBC   82 H 


 


Urine WBC   >182 H 


 


Urine WBC Clumps   Many H 


 


Amorphous Sediment   Rare H 


 


Urine Bacteria   Many H 


 


Hyaline Casts   73 H 


 


Urine Mucus   Many H 


 


Serum Alcohol   


 


Crossmatch   














  05/23/20 05/23/20 05/23/20





  16:26 17:57 20:46


 


RBC   


 


Hgb   


 


Hct   


 


MCV   


 


MCHC   


 


RDW   


 


Plt Count   


 


Macrocytosis   


 


PT   


 


INR   


 


APTT   


 


ABG pH  6.89 L*   7.12 L*


 


ABG pCO2  19 L*   22 L


 


ABG pO2  165 H   122 H


 


ABG HCO3  4 L*   7 L*


 


ABG Total CO2  4 L   8 L


 


ABG O2 Saturation  98.1 H   98.1 H


 


VBG pH   


 


VBG pCO2   


 


VBG HCO3   


 


Potassium   


 


Chloride   


 


Carbon Dioxide   


 


Creatinine   


 


Glucose   


 


POC Glucose (mg/dL)   135 H 


 


Osmolality   


 


Plasma Lactic Acid Scott   


 


Calcium   


 


AST   


 


ALT   


 


Alkaline Phosphatase   


 


Ammonia   


 


Creatine Kinase   


 


Total Protein   


 


Albumin   


 


Urine Appearance   


 


Urine Protein   


 


Urine Glucose (UA)   


 


Urine Blood   


 


Ur Leukocyte Esterase   


 


Urine RBC   


 


Urine WBC   


 


Urine WBC Clumps   


 


Amorphous Sediment   


 


Urine Bacteria   


 


Hyaline Casts   


 


Urine Mucus   


 


Serum Alcohol   


 


Crossmatch   














  05/23/20 05/23/20 05/23/20





  22:25 22:25 22:26


 


RBC  2.82 L  


 


Hgb  8.5 L D  


 


Hct  28.8 L  


 


MCV  102.2 H D  


 


MCHC  29.5 L  


 


RDW  19.9 H  


 


Plt Count  81 L D  


 


Macrocytosis   


 


PT   14.5 H 


 


INR   1.5 H 


 


APTT   30.6 H 


 


ABG pH   


 


ABG pCO2   


 


ABG pO2   


 


ABG HCO3   


 


ABG Total CO2   


 


ABG O2 Saturation   


 


VBG pH   


 


VBG pCO2   


 


VBG HCO3   


 


Potassium   


 


Chloride   


 


Carbon Dioxide   


 


Creatinine   


 


Glucose   


 


POC Glucose (mg/dL)    186 H


 


Osmolality   


 


Plasma Lactic Acid Scott   


 


Calcium   


 


AST   


 


ALT   


 


Alkaline Phosphatase   


 


Ammonia   


 


Creatine Kinase   


 


Total Protein   


 


Albumin   


 


Urine Appearance   


 


Urine Protein   


 


Urine Glucose (UA)   


 


Urine Blood   


 


Ur Leukocyte Esterase   


 


Urine RBC   


 


Urine WBC   


 


Urine WBC Clumps   


 


Amorphous Sediment   


 


Urine Bacteria   


 


Hyaline Casts   


 


Urine Mucus   


 


Serum Alcohol   


 


Crossmatch   














- Diagnostic Findings


Additional studies: 





CT of abdomen and pelvis as noted in HPI.





Assessment and Plan


Assessment: 





Impression:


Acute hypoxic respiratory failure, suspect abdominal sepsis.  And septic shock. 

Possible ischemic colitis.


Acute blood loss anemia with upper GI bleeding noted after placement of 

nasogastric tube.


Acute thrombocytopenia secondary to sepsis.


Shock liver secondary to hypotension and could also be related to history of 

alcohol abuse.


Mental status change, suspect hepatic encephalopathy with elevated ammonia 

level.


Acute kidney injury secondary to acute tubular necrosis and hypotension.


Severe anion gap metabolic acidosis, secondary to sepsis and acute kidney 

injury.


Acute lactic acidosis secondary to ischemic bowel and hypotension.


Possible urinary tract infection.


Acute alcohol intoxication based on her abnormal I'll call level on 

presentation.





Recommendation:


Continue present supportive care measures including


Ventilatory support.


Hemodynamic support.  Patient is presently on norepinephrine and vasopressin.


GI and DVT prophylaxis.  Avoid anticoagulation therapy.


Surgical consultation


Infectious disease consultation


GI consultation for upper GI bleeding.


Nephrology consultation for her acute kidney injury.


Empiric antibiotics.  Patient is now on Zosyn and Flagyl.


Transfuse accordingly, maintain hemoglobin of above 7.


Continue sodium bicarb.  And correct metabolic acidosis.  Patient received 

significant amount of fluid boluses initially.


Continue octreotide.


Overall prognosis is extremely poor and guarded.  Patient was already evaluated 

by general surgery, and felt to be unstable for any surgical intervention at 

this point.


We'll continue to follow.











Time with Patient: Greater than 30

## 2020-05-24 LAB
ALBUMIN SERPL-MCNC: 2.9 G/DL (ref 3.5–5)
ALP SERPL-CCNC: 221 U/L (ref 38–126)
ALT SERPL-CCNC: 50 U/L (ref 4–34)
ANION GAP SERPL CALC-SCNC: 15 MMOL/L
ANION GAP SERPL CALC-SCNC: 28 MMOL/L
ANION GAP SERPL CALC-SCNC: 28 MMOL/L
AST SERPL-CCNC: 355 U/L (ref 14–36)
BUN SERPL-SCNC: 12 MG/DL (ref 7–17)
BUN SERPL-SCNC: 15 MG/DL (ref 7–17)
CALCIUM SPEC-MCNC: 6.2 MG/DL (ref 8.4–10.2)
CALCIUM SPEC-MCNC: 6.2 MG/DL (ref 8.4–10.2)
CELLS COUNTED: 200
CHLORIDE SERPL-SCNC: 103 MMOL/L (ref 98–107)
CHLORIDE SERPL-SCNC: 107 MMOL/L (ref 98–107)
CHLORIDE SERPL-SCNC: 107 MMOL/L (ref 98–107)
CO2 BLDA-SCNC: 11 MMOL/L (ref 19–24)
CO2 BLDA-SCNC: 22 MMOL/L (ref 19–24)
CO2 SERPL-SCNC: 10 MMOL/L (ref 22–30)
CO2 SERPL-SCNC: 24 MMOL/L (ref 22–30)
CO2 SERPL-SCNC: 9 MMOL/L (ref 22–30)
ERYTHROCYTE [DISTWIDTH] IN BLOOD BY AUTOMATED COUNT: 2.76 M/UL (ref 3.8–5.4)
ERYTHROCYTE [DISTWIDTH] IN BLOOD BY AUTOMATED COUNT: 2.79 M/UL (ref 3.8–5.4)
ERYTHROCYTE [DISTWIDTH] IN BLOOD BY AUTOMATED COUNT: 2.79 M/UL (ref 3.8–5.4)
ERYTHROCYTE [DISTWIDTH] IN BLOOD: 20 % (ref 11.5–15.5)
ERYTHROCYTE [DISTWIDTH] IN BLOOD: 20.1 % (ref 11.5–15.5)
ERYTHROCYTE [DISTWIDTH] IN BLOOD: 20.4 % (ref 11.5–15.5)
GLUCOSE BLD-MCNC: 151 MG/DL (ref 75–99)
GLUCOSE BLD-MCNC: 168 MG/DL (ref 75–99)
GLUCOSE BLD-MCNC: 170 MG/DL (ref 75–99)
GLUCOSE BLD-MCNC: 170 MG/DL (ref 75–99)
GLUCOSE BLD-MCNC: 183 MG/DL (ref 75–99)
GLUCOSE BLD-MCNC: 200 MG/DL (ref 75–99)
GLUCOSE BLD-MCNC: 224 MG/DL (ref 75–99)
GLUCOSE BLD-MCNC: 229 MG/DL (ref 75–99)
GLUCOSE BLD-MCNC: 240 MG/DL (ref 75–99)
GLUCOSE BLD-MCNC: 261 MG/DL (ref 75–99)
GLUCOSE BLD-MCNC: 264 MG/DL (ref 75–99)
GLUCOSE SERPL-MCNC: 163 MG/DL (ref 74–99)
GLUCOSE SERPL-MCNC: 231 MG/DL (ref 74–99)
HCO3 BLDA-SCNC: 10 MMOL/L (ref 21–25)
HCO3 BLDA-SCNC: 21 MMOL/L (ref 21–25)
HCT VFR BLD AUTO: 27.1 % (ref 34–46)
HCT VFR BLD AUTO: 27.4 % (ref 34–46)
HCT VFR BLD AUTO: 28.1 % (ref 34–46)
HGB BLD-MCNC: 8.5 GM/DL (ref 11.4–16)
HGB BLD-MCNC: 8.5 GM/DL (ref 11.4–16)
HGB BLD-MCNC: 9 GM/DL (ref 11.4–16)
LYMPHOCYTES # BLD MANUAL: 0.98 K/UL (ref 1–4.8)
MCH RBC QN AUTO: 30.6 PG (ref 25–35)
MCH RBC QN AUTO: 30.7 PG (ref 25–35)
MCH RBC QN AUTO: 32.2 PG (ref 25–35)
MCHC RBC AUTO-ENTMCNC: 30.4 G/DL (ref 31–37)
MCHC RBC AUTO-ENTMCNC: 30.9 G/DL (ref 31–37)
MCHC RBC AUTO-ENTMCNC: 33.1 G/DL (ref 31–37)
MCV RBC AUTO: 100.7 FL (ref 80–100)
MCV RBC AUTO: 97.2 FL (ref 80–100)
MCV RBC AUTO: 99.3 FL (ref 80–100)
MONOCYTES # BLD MANUAL: 0.24 K/UL (ref 0–1)
NEUTROPHILS NFR BLD MANUAL: 59 %
NEUTS SEG # BLD MANUAL: 4.9 K/UL (ref 1.3–7.7)
PCO2 BLDA: 22 MMHG (ref 35–45)
PCO2 BLDA: 32 MMHG (ref 35–45)
PH BLDA: 7.28 [PH] (ref 7.35–7.45)
PH BLDA: 7.43 [PH] (ref 7.35–7.45)
PLATELET # BLD AUTO: 71 K/UL (ref 150–450)
PLATELET # BLD AUTO: 84 K/UL (ref 150–450)
PLATELET # BLD AUTO: 97 K/UL (ref 150–450)
PO2 BLDA: 90 MMHG (ref 83–108)
PO2 BLDA: 97 MMHG (ref 83–108)
POTASSIUM SERPL-SCNC: 3.8 MMOL/L (ref 3.5–5.1)
POTASSIUM SERPL-SCNC: 5.4 MMOL/L (ref 3.5–5.1)
POTASSIUM SERPL-SCNC: 5.7 MMOL/L (ref 3.5–5.1)
PROT SERPL-MCNC: 6 G/DL (ref 6.3–8.2)
SODIUM SERPL-SCNC: 142 MMOL/L (ref 137–145)
SODIUM SERPL-SCNC: 144 MMOL/L (ref 137–145)
SODIUM SERPL-SCNC: 145 MMOL/L (ref 137–145)
WBC # BLD AUTO: 4.5 K/UL (ref 3.8–10.6)
WBC # BLD AUTO: 6.1 K/UL (ref 3.8–10.6)
WBC # BLD AUTO: 8.5 K/UL (ref 3.8–10.6)

## 2020-05-24 RX ADMIN — PROPOFOL SCH MLS/HR: 10 INJECTION, EMULSION INTRAVENOUS at 23:46

## 2020-05-24 RX ADMIN — HYDROCORTISONE SODIUM SUCCINATE SCH MG: 100 INJECTION, POWDER, FOR SOLUTION INTRAMUSCULAR; INTRAVENOUS at 08:26

## 2020-05-24 RX ADMIN — LACTULOSE SCH GM: 20 SOLUTION ORAL at 20:17

## 2020-05-24 RX ADMIN — RIFAXIMIN SCH MG: 550 TABLET ORAL at 11:05

## 2020-05-24 RX ADMIN — NOREPINEPHRINE BITARTRATE SCH MLS/HR: 1 INJECTION, SOLUTION, CONCENTRATE INTRAVENOUS at 11:07

## 2020-05-24 RX ADMIN — CEFAZOLIN SCH MLS/HR: 330 INJECTION, POWDER, FOR SOLUTION INTRAMUSCULAR; INTRAVENOUS at 15:25

## 2020-05-24 RX ADMIN — PROPOFOL SCH MLS/HR: 10 INJECTION, EMULSION INTRAVENOUS at 10:16

## 2020-05-24 RX ADMIN — RIFAXIMIN SCH MG: 550 TABLET ORAL at 20:20

## 2020-05-24 RX ADMIN — INSULIN ASPART SCH UNIT: 100 INJECTION, SOLUTION INTRAVENOUS; SUBCUTANEOUS at 06:53

## 2020-05-24 RX ADMIN — METRONIDAZOLE SCH MLS/HR: 500 INJECTION, SOLUTION INTRAVENOUS at 22:04

## 2020-05-24 RX ADMIN — POTASSIUM CHLORIDE SCH MLS/HR: 7.46 INJECTION, SOLUTION INTRAVENOUS at 22:04

## 2020-05-24 RX ADMIN — HYDROCORTISONE SODIUM SUCCINATE SCH MG: 100 INJECTION, POWDER, FOR SOLUTION INTRAMUSCULAR; INTRAVENOUS at 00:24

## 2020-05-24 RX ADMIN — POTASSIUM CHLORIDE SCH MLS/HR: 7.46 INJECTION, SOLUTION INTRAVENOUS at 20:59

## 2020-05-24 RX ADMIN — HYDROCORTISONE SODIUM SUCCINATE SCH MG: 100 INJECTION, POWDER, FOR SOLUTION INTRAMUSCULAR; INTRAVENOUS at 22:04

## 2020-05-24 RX ADMIN — INSULIN ASPART SCH UNIT: 100 INJECTION, SOLUTION INTRAVENOUS; SUBCUTANEOUS at 02:53

## 2020-05-24 RX ADMIN — PROPOFOL SCH MLS/HR: 10 INJECTION, EMULSION INTRAVENOUS at 15:26

## 2020-05-24 RX ADMIN — POTASSIUM CHLORIDE SCH MLS/HR: 14.9 INJECTION, SOLUTION INTRAVENOUS at 11:06

## 2020-05-24 RX ADMIN — POTASSIUM CHLORIDE SCH MLS/HR: 14.9 INJECTION, SOLUTION INTRAVENOUS at 04:57

## 2020-05-24 RX ADMIN — PIPERACILLIN AND TAZOBACTAM SCH MLS/HR: 3; .375 INJECTION, POWDER, FOR SOLUTION INTRAVENOUS at 16:33

## 2020-05-24 RX ADMIN — METRONIDAZOLE SCH MLS/HR: 500 INJECTION, SOLUTION INTRAVENOUS at 08:26

## 2020-05-24 RX ADMIN — INSULIN HUMAN SCH MLS/HR: 100 INJECTION, SOLUTION PARENTERAL at 15:14

## 2020-05-24 RX ADMIN — CHLORHEXIDINE GLUCONATE SCH ML: 1.2 RINSE ORAL at 08:25

## 2020-05-24 RX ADMIN — PIPERACILLIN AND TAZOBACTAM SCH MLS/HR: 3; .375 INJECTION, POWDER, FOR SOLUTION INTRAVENOUS at 06:13

## 2020-05-24 RX ADMIN — CHLORHEXIDINE GLUCONATE SCH ML: 1.2 RINSE ORAL at 20:18

## 2020-05-24 RX ADMIN — METRONIDAZOLE SCH MLS/HR: 500 INJECTION, SOLUTION INTRAVENOUS at 15:25

## 2020-05-24 RX ADMIN — LACTULOSE SCH: 20 SOLUTION ORAL at 08:21

## 2020-05-24 RX ADMIN — CHLORHEXIDINE GLUCONATE SCH ML: 1.2 RINSE ORAL at 00:24

## 2020-05-24 RX ADMIN — PANTOPRAZOLE SODIUM SCH MG: 40 INJECTION, POWDER, FOR SOLUTION INTRAVENOUS at 08:26

## 2020-05-24 RX ADMIN — PROPOFOL SCH MLS/HR: 10 INJECTION, EMULSION INTRAVENOUS at 20:29

## 2020-05-24 RX ADMIN — POTASSIUM CHLORIDE SCH MLS/HR: 14.9 INJECTION, SOLUTION INTRAVENOUS at 20:15

## 2020-05-24 RX ADMIN — INSULIN ASPART SCH UNIT: 100 INJECTION, SOLUTION INTRAVENOUS; SUBCUTANEOUS at 11:10

## 2020-05-24 RX ADMIN — OCTREOTIDE ACETATE SCH MLS/HR: 200 INJECTION, SOLUTION INTRAVENOUS; SUBCUTANEOUS at 02:35

## 2020-05-24 RX ADMIN — METRONIDAZOLE SCH MLS/HR: 500 INJECTION, SOLUTION INTRAVENOUS at 02:08

## 2020-05-24 RX ADMIN — HYDROCORTISONE SODIUM SUCCINATE SCH MG: 100 INJECTION, POWDER, FOR SOLUTION INTRAMUSCULAR; INTRAVENOUS at 15:26

## 2020-05-24 RX ADMIN — LACTULOSE SCH GM: 20 SOLUTION ORAL at 00:24

## 2020-05-24 NOTE — PCN
PROCEDURE NOTE



OPERATIVE REPORT:

Placement of the left brachial arterial line.



PREOPERATIVE DIAGNOSIS:

Acute septic shock and hypotension.



POSTOPERATIVE DIAGNOSIS:

Acute septic shock and hypotension.



ANESTHESIA:

Used none deployed.



PROCEDURE IN DETAIL:

The left brachial artery was prepared and the left brachial region was prepared in a

sterile fashion and drapes were applied.  The left brachial artery was palpated,

cannulated easily and a guidewire was placed.  A Cook catheter was inserted over the

guidewire, the guidewire was removed.  Good blood flow, good waveform noted, line was

secured using 3.0 silk sutures, no evidence of any immediate complications.





MMODL / IJN: [id number] / Job#: [id number]

## 2020-05-24 NOTE — PN
PROGRESS NOTE



DATE OF SERVICE:

05/24/2020.



CHIEF COMPLAINT:

Cardiorespiratory arrest, lactic acidemia, cardiac murmur.



HISTORY OF PRESENT ILLNESS:

This lady is improving slowly.  Pressors are being weaned.  She is sedated but she has

moved about when she is not.



PHYSICAL EXAMINATION:

Blood pressure is 121/74.  Eyes are unchanged, pupils being mid range.  Breath sounds

are heard on both sides and cardiac exam demonstrates murmur.  Abdomen is soft.

Extremities are appear perfused.



IMPRESSION:

1. Cardiorespiratory arrest, etiology unknown.

2. Anemia.

3. Hypotension.

4. Possible intraabdominal catastrophe.

5. Possible sepsis.



PLAN:

Continue to follow with General Surgery.  She is also being evaluated by Cardiology.





MMODL / IJN: [id number] / Job#: [id number]

## 2020-05-24 NOTE — HP
HISTORY AND PHYSICAL



CHIEF COMPLAINT:

Altered mental status.



HISTORY OF PRESENT ILLNESS:

This 49-year-old year old  female was brought to the emergency room by

her boyfriend when she apparently became unresponsive.  She had taken a trazodone and

then apparently went back to bed.  EMS was summoned and when they arrived, her blood

sugar was low and she was minimally responsive.  Relatives or friends indicated that

she had histories of psychiatric problems in the past and recently moved to this area.

While in the emergency room, she was hypotensive at 65/37 and then underwent a

cardiorespiratory arrest and she was resuscitated and then transferred to ICU.  There

is no other history. Apparently, there was suggestion that she may have an

intraabdominal process such as an ischemic bowel.  Laboratory studies are abnormal with

a white count of 5600 and hemoglobin is 6.3.  Platelet count is 48,000.  She had plasma

lactic was 20.4.  Calcium was low at 7.5 and AST was elevated at 178 with an ALT of 43.

Alkaline phosphatase was 215.  Ammonia level was 199.  CK was 374.  Serum alcohol was

274.  Blood gases revealed a pH of 6.81, and CO2 of 22, and PO2 over 400 on 100% FiO2.



REVIEW OF SYSTEMS:

Could not be obtained.



Past medical history, family history and personal and social histories could not be

obtained.



PHYSICAL EXAM:

Blood pressure 120/53 with a pulse of 105, respirations 24 on a ventilator.  Head,

ears, eyes, nose, mouth, and throat were normal.  Pupils were mid-range and gaze is

conjugate.  Breath sounds are heard bilaterally.  Cardiac exam demonstrated 4/6

systolic murmur heard at the base.  Abdomen is soft.  There are no masses.

Extremities:  Normal and perfusion was adequate.



IMPRESSION:

1. Cardiorespiratory arrest.

2. Anemia.

3. Elevated liver function studies.

4. Lactic acidemia.

5. Possible intraabdominal process.

6. Cardiac murmur.



PLAN:

Continue to follow in the ICU with intensive medicine, cardiology and surgery.





MMODL / IJN: [id number] / Job#: [id number]

## 2020-05-24 NOTE — XR
EXAMINATION TYPE: XR chest 1V portable

 

DATE OF EXAM: 5/24/2020

 

COMPARISON: 5/23/2020

 

HISTORY: Tube placement

 

TECHNIQUE: Single frontal view of the chest is obtained.

 

FINDINGS:  Endotracheal tube has been retracted. This terminates approximately 9 mm from the jacob a
nd remains low-lying. Enteric tube terminates in the left upper quadrant appearing appropriately plac
ed. Strand-like bibasilar opacities are now present with improved aeration of the upper lungs. Stable
 size of the cardiomediastinal silhouette. Cardiac loop recorder again noted.

 

IMPRESSION:  

1. Endotracheal tube has been retracted no longer within the right mainstem bronchus however this rem
ains low-lying terminating approximately 9 mm from the jacob and should be retracted 1 to 2 cm for o
ptimal placement.

2. Improved aeration of the upper lungs with strand-like bibasilar opacities, possible atelectasis.

## 2020-05-24 NOTE — P.PN
Bruno Pate is a 7 week old male presenting with well visit.  Currently is not taking any rx medications.  Denies known Latex allergy or symptoms of Latex sensitivity.  Child accompanied by mother  Mother's work status: home  FEEDING:      breast      Frequency: 7-10 minutes, every 2-3 hours / day  SLEEPING      Where: in crib /bassinet in parent's room      Position: back  STOOLS: 2 / day  GROWTH AND DEVELOPMENT TOPICS:      Eyes follow object - YES      Vocalizes - YES      Prone-lifts and turns head - YES      Responds to noise - YES  MEDICATIONS: Patient is not currently taking any medication  CONCERNS:   nasal congestion at night  umbilical hernia  hydrocele  gassy  tongue tie  probiotic     Subjective


Progress Note Date: 05/24/20


Principal diagnosis: 





Acute hypoxic respiratory failure, cardiac arrest, suspect ischemic colitis with





This is a 49-year-old -American female, no available past medical history

on this patient, patient was brought into the emergency room by EMS when the 

patient was noted by a boyfriend unresponsive.  Patient is known to have history

of alcohol abuse, she is also known to have history of depression, normally 

takes trazodone.  According to the boyfriend, around 8:30 AM, patient was found 

unresponsive.  Upon EMS arrival patient was noted to have low sugars, and she 

was given an amp of dextrose.  She did wake up a bit, however remained 

lethargic, and on her weight in the ambulance to the ER patient was yelling and 

getting restless and agitated.  Upon arrival to the ER, patient was not follow 

any instructions.  Patient was evaluated, initially she was able to protect her 

airways, she would open her eyes and look around to tactile stimuli.  After IV 

access, and after basic labs were ordered, patient was noted to 12-lead EKG 

showed mostly normal sinus rhythm rate of 81/m.  She had peak T waves in V2 

through V4 and the patient was sent for CT of the brain because of altered 

mental status.  Upon returning from CT to the trauma bay, patient became 

hypotensive, levo fed was started for hypotension and bradycardia then she went 

into asystole.  CPR was started, epinephrine was given, patient was intubated by

the ER physician, and she had one round of chest compressions.  In the meantime 

patient was intubated, and her endotracheal tube was noted to be initially in 

the right mainstem bronchus, and this was retracted a bit.  Orogastric tube was 

placed, and she was noted to have 75 mL of bright blood in the orogastric tube. 

Patient continued to require levo fed, and she was given fluid boluses for 

hypotension.  Venous blood gases showed very low pH of 6.8, pCO2 was 25, and 

bicarb was 4.  Sodium bicarb was given.  Patient was noted to have elevated 

potassium, hence 10 units of insulin were given along with 1 amp of dextrose.  

Patient received 2 L of fluid boluses.  And labs came back showing a hemoglobin 

of 6.3.  Platelets were also noted to be low at 48,000.  INR was 1.7.  

Creatinine 3.5.  And her liver enzymes were elevated.  Ammonia level was also 

noted to be 199.  Her serum alcohol was 274.  Patient was placed on a bicarb 

drip, she was placed empirically on antibiotics, and she was also placed on 

lactulose.  CT of the brain showed no evidence of intracranial hemorrhage or 

mass effect.  CT of the abdomen and pelvis was abnormal showing severe 

pancolitis with mesenteric edema and small volume ascites.  The radiologist felt

this could be consistent with ischemic bowel.  Patient was also noted to have 

severe degree of hepatic steatosis.  And bibasilar airspace disease/atelectasis.

 Patient remained hypotensive requiring more norepinephrine, and vasopressin was

added.  Surgical consultation was initiated, however considering the patient's 

clinical presentation and hypotension requiring significant amount of 

vasopressin and norepinephrine, and considering the patient was extremely 

acidotic, her lactic acid was as high as 20.  Patient felt to be not a surgical 

candidate.  And she needed to be more resuscitated before surgery could even be 

considered.  Patient was also placed on Sandostatin.  Rocephin was given in the 

ER, and I have recommended Flagyl and Zosyn.  I came in to see the patient in 

the ICU, a left brachial arterial line was placed, and recommended further labs 

to be ordered including ABG, coagulation profile, and CBC.  These are presently 

pending.  In the meantime patient received a total of 2 units of packed RBCs, 2 

units of fresh frozen plasma, and 1 unit of platelets.  Drug screen was 

basically negative except she had acetaminophen level of 11.5.  Salicylate 1.0, 

and serum alcohol level was 274.  





Patient was reevaluated today on 5/24/20.  Remains intubated and mechanically 

ventilated.  Her ventilator settings are assist control rate of 20, volume is 

400 FiO2 is 40% and PEEP of 5.  ABG showed a pO2 of 97 pCO2 of 22 pH of 7.28.  

Patient remains on sodium bicarb drip.  Remains on octreotide, Versed at 5 mg 

per hour, vasopressin at 0.04 units per hour, norepinephrine at 0.75 mcg/kg/m, 

is also on bicarb drip at 150 MLS per hour.  Patient has no urine output, her 

liver enzymes remain elevated.  Her abdomen remains distended.  She was seen by 

many consultants including surgery, patient remains a very poor candidate for 

any surgical intervention at this point as she seems to be hemodynamically unsta

ble.  Ideally speaking would be worthwhile doing exploratory laparotomy, however

the surgeon evaluated the patient, and we both felt that she is a high surgical 

risk at this point, and her hemodynamic status will not allow surgical 

intervention safely.  Her boyfriend is here today, and I discussed her status 

with the boyfriend, he told me that the patient is known to be alcoholic, he 

also told me that the night before she came into the hospital she was having 

lower back pain and abdominal pain.  She had this all night and she was moaning 

and groaning with pain.  The following day she was unresponsive and she had 

mental status changes, that is when EMS was notified.  Patient received so far s

franklin admission 2 units of packed RBCs, 2 units of fresh frozen plasma, and 

bunions of platelets.  She is also on Zosyn and Flagyl empirically for abdominal

sepsis and septic shock.  WBC count today is 6.1 hemoglobin is 8.5.  Patient 

continues to have significant bandemia on her differential.  Potassium is a bit 

high at 5.4 bicarb today is 10 BUN is 12 creatinine is 3.52.  Liver enzymes 

remain elevated.  And her blood sugar is 240.  Calcium is low, the patient will 

receive calcium gluconate.  Ammonia level is down to 65, patient remains on 

lactulose.  AST is 355 and ALT is 50 with alkaline phosphatase of 221.  Chest x-

ray showed improved aeration of upper lungs with stand like bibasilar atele

ctasis.  Ultrasound of the abdomen and pelvis showed partially visualized 

abdominal ascites.  No hydronephrosis.  And a trace of perihepatic ascites 

noted.  Patient is sedated on Versed











Objective





- Vital Signs


Vital signs: 


                                   Vital Signs











Temp  99.7 F H  05/24/20 08:00


 


Pulse  122 H  05/24/20 11:38


 


Resp  29 H  05/24/20 11:30


 


BP  119/62   05/24/20 11:30


 


Pulse Ox  98   05/24/20 11:30








                                 Intake & Output











 05/23/20 05/24/20 05/24/20





 18:59 06:59 18:59


 


Intake Total 487.675 5167.863 979.450


 


Output Total 10 255 30


 


Balance 268.879 2546.863 949.450


 


Weight 84.5 kg 84.5 kg 


 


Intake:   


 


  IV  1974 918


 


    Dextrose 5% in Water 1,  1750 900





    000 ml @ 150 mls/hr IV .   





    Q7H40M PATRICIA with Sodium   





    Bicarb (1 Meq/ml) 150 ml   





    Rx#:[id number]   


 


    Sodium bicarbonate IVP  200 


 


    pressure bag  24 18


 


  Intake, IV Titration 19.137 494.863 61.450





  Amount   


 


    Midazolam HCl 50 mg In  18.583 50





    Sodium Chloride 0.9% 40   





    ml @ 1 MG/HR 1 mls/hr IV   





    .Q24H [facility] Rx#:[id number]   


 


    Norepinephrine 32 mg In 19.137 230.863 





    Sodium Chloride 0.9% 218   





    ml @ 0.05 MCG/KG/MIN 1.   





    914 mls/hr IV .Q24H ONE   





    Rx#:[id number]   


 


    Octreotide 500 mcg In  245.417 





    Sodium Chloride 0.9% 250   





    ml @ 50 MCG/HR 25 mls/hr   





    IV .Q10H [facility] Rx#:   





    [id number]   


 


    Propofol 1,000 mg In   11.450





    Empty Bag 1 bag @ Titrate   





    IV .Q0M [facility] Rx#:   





    [id number]   


 


  Blood Product 310 1115 


 


    Ffp 24 Cpd  Unit  275 





    [id number]   


 


    Ffp 24 Cpd  Unit  316 





    [id number]   


 


    Platelet Irr Pheresis 2  214 





    Acda  Unit [id number]   


 


    Rc As-1  Unit  310 





    [id number]   


 


    Rc As-3  Unit 310  





    [id number]   


 


Output:   


 


  Gastric Drainage  200 


 


  Urine 10 55 30


 


    Uretheral (Foster) 10  


 


Other:   


 


  Voiding Method  Indwelling Catheter Indwelling Catheter


 


  # Voids  0 0








                       ABP, PAP, CO, CI - Last Documented











Arterial Blood Pressure        134/58

















- Exam





Physical Exam: Revealed a 49-year-old -American female, intubated, on 

Versed drip.


Head: Atraumatic, normocephalic, endotracheal tube and orogastric tube are 

intact.


HEENT:[Neck is supple.] [No neck masses.] [No thyromegaly.] [No JVD.]  Moist 

mucous membranes.  PERRLA, EOMI, no icterus.


Chest: [Symmetrical chest expansion, crackles at the bases, no rhonchi and no 

wheezes..]


Cardiac Exam: [Normal S1 and S2, no S3 gallop, no murmur.]


Abdomen: [Soft, distended, nontender,  no megaly, no rebound, no guarding, no 

bowel sounds.


Extremities: [No clubbing, no edema, no cyanosis.]


Neurological Exam: Patient is intubated, sedated, unable to assess.


Psychiatric: Could not be assessed.


Skin: No rashes.











- Labs


CBC & Chem 7: 


                                 05/24/20 06:45





                                 05/24/20 06:45


Labs: 


                  Abnormal Lab Results - Last 24 Hours (Table)











  05/23/20 05/23/20 05/23/20 Range/Units





  08:18 08:18 12:15 


 


RBC     (3.80-5.40)  m/uL


 


Hgb     (11.4-16.0)  gm/dL


 


Hct     (34.0-46.0)  %


 


MCV     (80.0-100.0)  fL


 


MCHC     (31.0-37.0)  g/dL


 


RDW     (11.5-15.5)  %


 


Plt Count     (150-450)  k/uL


 


Lymphocytes # (Manual)     (1.0-4.8)  k/uL


 


PT     (9.0-12.0)  sec


 


INR     (<1.2)  


 


APTT     (22.0-30.0)  sec


 


ABG pH     (7.35-7.45)  


 


ABG pCO2     (35-45)  mmHg


 


ABG pO2     ()  mmHg


 


ABG HCO3     (21-25)  mmol/L


 


ABG Total CO2     (19-24)  mmol/L


 


ABG O2 Saturation     (94-97)  %


 


ABG Lactic Acid     (0.5-1.6)  mmol/L


 


Potassium     (3.5-5.1)  mmol/L


 


Chloride     ()  mmol/L


 


Carbon Dioxide     (22-30)  mmol/L


 


Creatinine     (0.52-1.04)  mg/dL


 


Glucose     (74-99)  mg/dL


 


POC Glucose (mg/dL)     (75-99)  mg/dL


 


Osmolality   382 H*   (280-301)  mosm/kg


 


Plasma Lactic Acid Scott     (0.7-2.0)  mmol/L


 


Calcium     (8.4-10.2)  mg/dL


 


Total Bilirubin     (0.2-1.3)  mg/dL


 


AST     (14-36)  U/L


 


ALT     (4-34)  U/L


 


Alkaline Phosphatase     ()  U/L


 


Ammonia     (<30)  umol/L


 


Total Protein     (6.3-8.2)  g/dL


 


Albumin     (3.5-5.0)  g/dL


 


Urine Appearance     (Clear)  


 


Urine Protein     (Negative)  


 


Urine Glucose (UA)     (Negative)  


 


Urine Blood     (Negative)  


 


Ur Leukocyte Esterase     (Negative)  


 


Urine RBC     (0-5)  /hpf


 


Urine WBC     (0-5)  /hpf


 


Urine WBC Clumps     (None)  /hpf


 


Amorphous Sediment     (None)  /hpf


 


Urine Bacteria     (None)  /hpf


 


Hyaline Casts     (0-2)  /lpf


 


Urine Mucus     (None)  /hpf


 


Ethyl Alcohol Screen  191 H    (Negative)  mg/dL


 


Crossmatch    See Detail  














  05/23/20 05/23/20 05/23/20 Range/Units





  13:25 14:22 14:29 


 


RBC     (3.80-5.40)  m/uL


 


Hgb     (11.4-16.0)  gm/dL


 


Hct     (34.0-46.0)  %


 


MCV     (80.0-100.0)  fL


 


MCHC     (31.0-37.0)  g/dL


 


RDW     (11.5-15.5)  %


 


Plt Count     (150-450)  k/uL


 


Lymphocytes # (Manual)     (1.0-4.8)  k/uL


 


PT     (9.0-12.0)  sec


 


INR     (<1.2)  


 


APTT     (22.0-30.0)  sec


 


ABG pH  6.81 L*    (7.35-7.45)  


 


ABG pCO2  22 L    (35-45)  mmHg


 


ABG pO2  >400 H    ()  mmHg


 


ABG HCO3  4 L*    (21-25)  mmol/L


 


ABG Total CO2  4 L    (19-24)  mmol/L


 


ABG O2 Saturation  99.3 H    (94-97)  %


 


ABG Lactic Acid     (0.5-1.6)  mmol/L


 


Potassium     (3.5-5.1)  mmol/L


 


Chloride     ()  mmol/L


 


Carbon Dioxide     (22-30)  mmol/L


 


Creatinine     (0.52-1.04)  mg/dL


 


Glucose     (74-99)  mg/dL


 


POC Glucose (mg/dL)    210 H  (75-99)  mg/dL


 


Osmolality     (280-301)  mosm/kg


 


Plasma Lactic Acid Scott     (0.7-2.0)  mmol/L


 


Calcium     (8.4-10.2)  mg/dL


 


Total Bilirubin     (0.2-1.3)  mg/dL


 


AST     (14-36)  U/L


 


ALT     (4-34)  U/L


 


Alkaline Phosphatase     ()  U/L


 


Ammonia     (<30)  umol/L


 


Total Protein     (6.3-8.2)  g/dL


 


Albumin     (3.5-5.0)  g/dL


 


Urine Appearance   Turbid H   (Clear)  


 


Urine Protein   2+ H   (Negative)  


 


Urine Glucose (UA)   Trace H   (Negative)  


 


Urine Blood   Moderate H   (Negative)  


 


Ur Leukocyte Esterase   Large H   (Negative)  


 


Urine RBC   82 H   (0-5)  /hpf


 


Urine WBC   >182 H   (0-5)  /hpf


 


Urine WBC Clumps   Many H   (None)  /hpf


 


Amorphous Sediment   Rare H   (None)  /hpf


 


Urine Bacteria   Many H   (None)  /hpf


 


Hyaline Casts   73 H   (0-2)  /lpf


 


Urine Mucus   Many H   (None)  /hpf


 


Ethyl Alcohol Screen     (Negative)  mg/dL


 


Crossmatch     














  05/23/20 05/23/20 05/23/20 Range/Units





  16:26 17:57 20:00 


 


RBC     (3.80-5.40)  m/uL


 


Hgb     (11.4-16.0)  gm/dL


 


Hct     (34.0-46.0)  %


 


MCV     (80.0-100.0)  fL


 


MCHC     (31.0-37.0)  g/dL


 


RDW     (11.5-15.5)  %


 


Plt Count     (150-450)  k/uL


 


Lymphocytes # (Manual)     (1.0-4.8)  k/uL


 


PT     (9.0-12.0)  sec


 


INR     (<1.2)  


 


APTT     (22.0-30.0)  sec


 


ABG pH  6.89 L*    (7.35-7.45)  


 


ABG pCO2  19 L*    (35-45)  mmHg


 


ABG pO2  165 H    ()  mmHg


 


ABG HCO3  4 L*    (21-25)  mmol/L


 


ABG Total CO2  4 L    (19-24)  mmol/L


 


ABG O2 Saturation  98.1 H    (94-97)  %


 


ABG Lactic Acid     (0.5-1.6)  mmol/L


 


Potassium     (3.5-5.1)  mmol/L


 


Chloride     ()  mmol/L


 


Carbon Dioxide     (22-30)  mmol/L


 


Creatinine     (0.52-1.04)  mg/dL


 


Glucose     (74-99)  mg/dL


 


POC Glucose (mg/dL)   135 H   (75-99)  mg/dL


 


Osmolality     (280-301)  mosm/kg


 


Plasma Lactic Acid Scott    18.1 H*  (0.7-2.0)  mmol/L


 


Calcium     (8.4-10.2)  mg/dL


 


Total Bilirubin     (0.2-1.3)  mg/dL


 


AST     (14-36)  U/L


 


ALT     (4-34)  U/L


 


Alkaline Phosphatase     ()  U/L


 


Ammonia     (<30)  umol/L


 


Total Protein     (6.3-8.2)  g/dL


 


Albumin     (3.5-5.0)  g/dL


 


Urine Appearance     (Clear)  


 


Urine Protein     (Negative)  


 


Urine Glucose (UA)     (Negative)  


 


Urine Blood     (Negative)  


 


Ur Leukocyte Esterase     (Negative)  


 


Urine RBC     (0-5)  /hpf


 


Urine WBC     (0-5)  /hpf


 


Urine WBC Clumps     (None)  /hpf


 


Amorphous Sediment     (None)  /hpf


 


Urine Bacteria     (None)  /hpf


 


Hyaline Casts     (0-2)  /lpf


 


Urine Mucus     (None)  /hpf


 


Ethyl Alcohol Screen     (Negative)  mg/dL


 


Crossmatch     














  05/23/20 05/23/20 05/23/20 Range/Units





  20:46 22:25 22:25 


 


RBC   2.82 L   (3.80-5.40)  m/uL


 


Hgb   8.5 L D   (11.4-16.0)  gm/dL


 


Hct   28.8 L   (34.0-46.0)  %


 


MCV   102.2 H D   (80.0-100.0)  fL


 


MCHC   29.5 L   (31.0-37.0)  g/dL


 


RDW   19.9 H   (11.5-15.5)  %


 


Plt Count   81 L D   (150-450)  k/uL


 


Lymphocytes # (Manual)     (1.0-4.8)  k/uL


 


PT     (9.0-12.0)  sec


 


INR     (<1.2)  


 


APTT     (22.0-30.0)  sec


 


ABG pH  7.12 L*    (7.35-7.45)  


 


ABG pCO2  22 L    (35-45)  mmHg


 


ABG pO2  122 H    ()  mmHg


 


ABG HCO3  7 L*    (21-25)  mmol/L


 


ABG Total CO2  8 L    (19-24)  mmol/L


 


ABG O2 Saturation  98.1 H    (94-97)  %


 


ABG Lactic Acid     (0.5-1.6)  mmol/L


 


Potassium    5.4 H  (3.5-5.1)  mmol/L


 


Chloride    109 H  ()  mmol/L


 


Carbon Dioxide    7 L*  (22-30)  mmol/L


 


Creatinine    3.03 H  (0.52-1.04)  mg/dL


 


Glucose    174 H  (74-99)  mg/dL


 


POC Glucose (mg/dL)     (75-99)  mg/dL


 


Osmolality     (280-301)  mosm/kg


 


Plasma Lactic Acid Scott     (0.7-2.0)  mmol/L


 


Calcium    6.6 L  (8.4-10.2)  mg/dL


 


Total Bilirubin    1.4 H  (0.2-1.3)  mg/dL


 


AST    289 H  (14-36)  U/L


 


ALT    53 H  (4-34)  U/L


 


Alkaline Phosphatase    241 H  ()  U/L


 


Ammonia     (<30)  umol/L


 


Total Protein    6.1 L  (6.3-8.2)  g/dL


 


Albumin    3.0 L  (3.5-5.0)  g/dL


 


Urine Appearance     (Clear)  


 


Urine Protein     (Negative)  


 


Urine Glucose (UA)     (Negative)  


 


Urine Blood     (Negative)  


 


Ur Leukocyte Esterase     (Negative)  


 


Urine RBC     (0-5)  /hpf


 


Urine WBC     (0-5)  /hpf


 


Urine WBC Clumps     (None)  /hpf


 


Amorphous Sediment     (None)  /hpf


 


Urine Bacteria     (None)  /hpf


 


Hyaline Casts     (0-2)  /lpf


 


Urine Mucus     (None)  /hpf


 


Ethyl Alcohol Screen     (Negative)  mg/dL


 


Crossmatch     














  05/23/20 05/23/20 05/23/20 Range/Units





  22:25 22:26 22:50 


 


RBC     (3.80-5.40)  m/uL


 


Hgb     (11.4-16.0)  gm/dL


 


Hct     (34.0-46.0)  %


 


MCV     (80.0-100.0)  fL


 


MCHC     (31.0-37.0)  g/dL


 


RDW     (11.5-15.5)  %


 


Plt Count     (150-450)  k/uL


 


Lymphocytes # (Manual)     (1.0-4.8)  k/uL


 


PT  14.5 H    (9.0-12.0)  sec


 


INR  1.5 H    (<1.2)  


 


APTT  30.6 H    (22.0-30.0)  sec


 


ABG pH    7.17 L*  (7.35-7.45)  


 


ABG pCO2    23 L  (35-45)  mmHg


 


ABG pO2    116 H  ()  mmHg


 


ABG HCO3    8 L*  (21-25)  mmol/L


 


ABG Total CO2    9 L  (19-24)  mmol/L


 


ABG O2 Saturation    97.9 H  (94-97)  %


 


ABG Lactic Acid     (0.5-1.6)  mmol/L


 


Potassium     (3.5-5.1)  mmol/L


 


Chloride     ()  mmol/L


 


Carbon Dioxide     (22-30)  mmol/L


 


Creatinine     (0.52-1.04)  mg/dL


 


Glucose     (74-99)  mg/dL


 


POC Glucose (mg/dL)   186 H   (75-99)  mg/dL


 


Osmolality     (280-301)  mosm/kg


 


Plasma Lactic Acid Scott     (0.7-2.0)  mmol/L


 


Calcium     (8.4-10.2)  mg/dL


 


Total Bilirubin     (0.2-1.3)  mg/dL


 


AST     (14-36)  U/L


 


ALT     (4-34)  U/L


 


Alkaline Phosphatase     ()  U/L


 


Ammonia     (<30)  umol/L


 


Total Protein     (6.3-8.2)  g/dL


 


Albumin     (3.5-5.0)  g/dL


 


Urine Appearance     (Clear)  


 


Urine Protein     (Negative)  


 


Urine Glucose (UA)     (Negative)  


 


Urine Blood     (Negative)  


 


Ur Leukocyte Esterase     (Negative)  


 


Urine RBC     (0-5)  /hpf


 


Urine WBC     (0-5)  /hpf


 


Urine WBC Clumps     (None)  /hpf


 


Amorphous Sediment     (None)  /hpf


 


Urine Bacteria     (None)  /hpf


 


Hyaline Casts     (0-2)  /lpf


 


Urine Mucus     (None)  /hpf


 


Ethyl Alcohol Screen     (Negative)  mg/dL


 


Crossmatch     














  05/24/20 05/24/20 05/24/20 Range/Units





  02:32 02:35 02:35 


 


RBC   2.79 L   (3.80-5.40)  m/uL


 


Hgb   8.5 L   (11.4-16.0)  gm/dL


 


Hct   28.1 L   (34.0-46.0)  %


 


MCV   100.7 H   (80.0-100.0)  fL


 


MCHC   30.4 L   (31.0-37.0)  g/dL


 


RDW   20.1 H   (11.5-15.5)  %


 


Plt Count   97 L   (150-450)  k/uL


 


Lymphocytes # (Manual)     (1.0-4.8)  k/uL


 


PT     (9.0-12.0)  sec


 


INR     (<1.2)  


 


APTT     (22.0-30.0)  sec


 


ABG pH     (7.35-7.45)  


 


ABG pCO2     (35-45)  mmHg


 


ABG pO2     ()  mmHg


 


ABG HCO3     (21-25)  mmol/L


 


ABG Total CO2     (19-24)  mmol/L


 


ABG O2 Saturation     (94-97)  %


 


ABG Lactic Acid     (0.5-1.6)  mmol/L


 


Potassium    5.7 H  (3.5-5.1)  mmol/L


 


Chloride     ()  mmol/L


 


Carbon Dioxide    9 L*  (22-30)  mmol/L


 


Creatinine     (0.52-1.04)  mg/dL


 


Glucose     (74-99)  mg/dL


 


POC Glucose (mg/dL)  229 H    (75-99)  mg/dL


 


Osmolality     (280-301)  mosm/kg


 


Plasma Lactic Acid Scott     (0.7-2.0)  mmol/L


 


Calcium     (8.4-10.2)  mg/dL


 


Total Bilirubin     (0.2-1.3)  mg/dL


 


AST     (14-36)  U/L


 


ALT     (4-34)  U/L


 


Alkaline Phosphatase     ()  U/L


 


Ammonia     (<30)  umol/L


 


Total Protein     (6.3-8.2)  g/dL


 


Albumin     (3.5-5.0)  g/dL


 


Urine Appearance     (Clear)  


 


Urine Protein     (Negative)  


 


Urine Glucose (UA)     (Negative)  


 


Urine Blood     (Negative)  


 


Ur Leukocyte Esterase     (Negative)  


 


Urine RBC     (0-5)  /hpf


 


Urine WBC     (0-5)  /hpf


 


Urine WBC Clumps     (None)  /hpf


 


Amorphous Sediment     (None)  /hpf


 


Urine Bacteria     (None)  /hpf


 


Hyaline Casts     (0-2)  /lpf


 


Urine Mucus     (None)  /hpf


 


Ethyl Alcohol Screen     (Negative)  mg/dL


 


Crossmatch     














  05/24/20 05/24/20 05/24/20 Range/Units





  06:45 06:45 06:45 


 


RBC  2.76 L    (3.80-5.40)  m/uL


 


Hgb  8.5 L    (11.4-16.0)  gm/dL


 


Hct  27.4 L    (34.0-46.0)  %


 


MCV     (80.0-100.0)  fL


 


MCHC  30.9 L    (31.0-37.0)  g/dL


 


RDW  20.0 H    (11.5-15.5)  %


 


Plt Count  84 L    (150-450)  k/uL


 


Lymphocytes # (Manual)  0.98 L    (1.0-4.8)  k/uL


 


PT     (9.0-12.0)  sec


 


INR     (<1.2)  


 


APTT     (22.0-30.0)  sec


 


ABG pH     (7.35-7.45)  


 


ABG pCO2     (35-45)  mmHg


 


ABG pO2     ()  mmHg


 


ABG HCO3     (21-25)  mmol/L


 


ABG Total CO2     (19-24)  mmol/L


 


ABG O2 Saturation     (94-97)  %


 


ABG Lactic Acid     (0.5-1.6)  mmol/L


 


Potassium   5.4 H   (3.5-5.1)  mmol/L


 


Chloride     ()  mmol/L


 


Carbon Dioxide   10 L   (22-30)  mmol/L


 


Creatinine   3.52 H   (0.52-1.04)  mg/dL


 


Glucose   231 H   (74-99)  mg/dL


 


POC Glucose (mg/dL)     (75-99)  mg/dL


 


Osmolality     (280-301)  mosm/kg


 


Plasma Lactic Acid Scott     (0.7-2.0)  mmol/L


 


Calcium   6.2 L*   (8.4-10.2)  mg/dL


 


Total Bilirubin   1.5 H   (0.2-1.3)  mg/dL


 


AST   355 H   (14-36)  U/L


 


ALT   50 H   (4-34)  U/L


 


Alkaline Phosphatase   221 H   ()  U/L


 


Ammonia    65 H  (<30)  umol/L


 


Total Protein   6.0 L   (6.3-8.2)  g/dL


 


Albumin   2.9 L   (3.5-5.0)  g/dL


 


Urine Appearance     (Clear)  


 


Urine Protein     (Negative)  


 


Urine Glucose (UA)     (Negative)  


 


Urine Blood     (Negative)  


 


Ur Leukocyte Esterase     (Negative)  


 


Urine RBC     (0-5)  /hpf


 


Urine WBC     (0-5)  /hpf


 


Urine WBC Clumps     (None)  /hpf


 


Amorphous Sediment     (None)  /hpf


 


Urine Bacteria     (None)  /hpf


 


Hyaline Casts     (0-2)  /lpf


 


Urine Mucus     (None)  /hpf


 


Ethyl Alcohol Screen     (Negative)  mg/dL


 


Crossmatch     














  05/24/20 05/24/20 05/24/20 Range/Units





  06:45 06:47 07:48 


 


RBC     (3.80-5.40)  m/uL


 


Hgb     (11.4-16.0)  gm/dL


 


Hct     (34.0-46.0)  %


 


MCV     (80.0-100.0)  fL


 


MCHC     (31.0-37.0)  g/dL


 


RDW     (11.5-15.5)  %


 


Plt Count     (150-450)  k/uL


 


Lymphocytes # (Manual)     (1.0-4.8)  k/uL


 


PT     (9.0-12.0)  sec


 


INR     (<1.2)  


 


APTT     (22.0-30.0)  sec


 


ABG pH    7.28 L  (7.35-7.45)  


 


ABG pCO2    22 L  (35-45)  mmHg


 


ABG pO2     ()  mmHg


 


ABG HCO3    10 L*  (21-25)  mmol/L


 


ABG Total CO2    11 L  (19-24)  mmol/L


 


ABG O2 Saturation    97.1 H  (94-97)  %


 


ABG Lactic Acid  17.5 H*    (0.5-1.6)  mmol/L


 


Potassium     (3.5-5.1)  mmol/L


 


Chloride     ()  mmol/L


 


Carbon Dioxide     (22-30)  mmol/L


 


Creatinine     (0.52-1.04)  mg/dL


 


Glucose     (74-99)  mg/dL


 


POC Glucose (mg/dL)   240 H   (75-99)  mg/dL


 


Osmolality     (280-301)  mosm/kg


 


Plasma Lactic Acid Scott     (0.7-2.0)  mmol/L


 


Calcium     (8.4-10.2)  mg/dL


 


Total Bilirubin     (0.2-1.3)  mg/dL


 


AST     (14-36)  U/L


 


ALT     (4-34)  U/L


 


Alkaline Phosphatase     ()  U/L


 


Ammonia     (<30)  umol/L


 


Total Protein     (6.3-8.2)  g/dL


 


Albumin     (3.5-5.0)  g/dL


 


Urine Appearance     (Clear)  


 


Urine Protein     (Negative)  


 


Urine Glucose (UA)     (Negative)  


 


Urine Blood     (Negative)  


 


Ur Leukocyte Esterase     (Negative)  


 


Urine RBC     (0-5)  /hpf


 


Urine WBC     (0-5)  /hpf


 


Urine WBC Clumps     (None)  /hpf


 


Amorphous Sediment     (None)  /hpf


 


Urine Bacteria     (None)  /hpf


 


Hyaline Casts     (0-2)  /lpf


 


Urine Mucus     (None)  /hpf


 


Ethyl Alcohol Screen     (Negative)  mg/dL


 


Crossmatch     














  05/24/20 Range/Units





  11:09 


 


RBC   (3.80-5.40)  m/uL


 


Hgb   (11.4-16.0)  gm/dL


 


Hct   (34.0-46.0)  %


 


MCV   (80.0-100.0)  fL


 


MCHC   (31.0-37.0)  g/dL


 


RDW   (11.5-15.5)  %


 


Plt Count   (150-450)  k/uL


 


Lymphocytes # (Manual)   (1.0-4.8)  k/uL


 


PT   (9.0-12.0)  sec


 


INR   (<1.2)  


 


APTT   (22.0-30.0)  sec


 


ABG pH   (7.35-7.45)  


 


ABG pCO2   (35-45)  mmHg


 


ABG pO2   ()  mmHg


 


ABG HCO3   (21-25)  mmol/L


 


ABG Total CO2   (19-24)  mmol/L


 


ABG O2 Saturation   (94-97)  %


 


ABG Lactic Acid   (0.5-1.6)  mmol/L


 


Potassium   (3.5-5.1)  mmol/L


 


Chloride   ()  mmol/L


 


Carbon Dioxide   (22-30)  mmol/L


 


Creatinine   (0.52-1.04)  mg/dL


 


Glucose   (74-99)  mg/dL


 


POC Glucose (mg/dL)  264 H  (75-99)  mg/dL


 


Osmolality   (280-301)  mosm/kg


 


Plasma Lactic Acid Scott   (0.7-2.0)  mmol/L


 


Calcium   (8.4-10.2)  mg/dL


 


Total Bilirubin   (0.2-1.3)  mg/dL


 


AST   (14-36)  U/L


 


ALT   (4-34)  U/L


 


Alkaline Phosphatase   ()  U/L


 


Ammonia   (<30)  umol/L


 


Total Protein   (6.3-8.2)  g/dL


 


Albumin   (3.5-5.0)  g/dL


 


Urine Appearance   (Clear)  


 


Urine Protein   (Negative)  


 


Urine Glucose (UA)   (Negative)  


 


Urine Blood   (Negative)  


 


Ur Leukocyte Esterase   (Negative)  


 


Urine RBC   (0-5)  /hpf


 


Urine WBC   (0-5)  /hpf


 


Urine WBC Clumps   (None)  /hpf


 


Amorphous Sediment   (None)  /hpf


 


Urine Bacteria   (None)  /hpf


 


Hyaline Casts   (0-2)  /lpf


 


Urine Mucus   (None)  /hpf


 


Ethyl Alcohol Screen   (Negative)  mg/dL


 


Crossmatch   








                      Microbiology - Last 24 Hours (Table)











 05/23/20 14:22 Urine Culture - Preliminary





 Urine,Voided 


 


 05/23/20 20:14 Sputum Culture - Preliminary





 Sputum 














Assessment and Plan


Assessment: 





Impression:


Acute hypoxic respiratory failure, suspect abdominal sepsis.  And septic shock. 

Strongly suspect ischemic colitis.


Acute blood loss anemia with upper GI bleeding noted after placement of nasogas

tric tube.


Acute thrombocytopenia secondary to sepsis.


Shock liver secondary to hypotension and could also be related to history of 

alcohol abuse.


Mental status change, suspect hepatic encephalopathy with elevated ammonia 

level.


Acute kidney injury secondary to acute tubular necrosis and hypotension.


Severe anion gap metabolic acidosis, secondary to sepsis and acute kidney 

injury.


Acute lactic acidosis secondary to ischemic bowel and hypotension.


Possible urinary tract infection.


Acute alcohol intoxication based on her abnormal I'll call level on 

presentation.





Recommendation:


Continue present supportive care measures including


Ventilatory support.


Hemodynamic support.  Continue vasopressin and norepinephrine..


GI and DVT prophylaxis.  Avoid anticoagulation therapy.


Surgical consultation, discussed the case with the surgeon/Dr. brandon today, 

again the patient is extremely poor surgical candidate at this point.


Infectious disease consultation, pending.


GI consultation for upper GI bleeding.


Patient may have to be considered for dialysis considering the patient is anuric


Empiric antibiotics.  Patient is now on Zosyn and Flagyl.


Transfuse accordingly, maintain hemoglobin of above 7.


Continue sodium bicarb.  Monitor acid base status closely.


Continue octreotide.  Patient to be seen by gastroenterology today


Discussed her condition with her boyfriend who has been her boyfriend for the 

last 5 years.  And he will try to approach her family and updated them on her 

status.  Her family lives in [place].


Overall prognosis is extremely poor and guarded.  


Critical care time is 36 minutes.











Time with Patient: Greater than 30

## 2020-05-24 NOTE — P.NPCON
History of Present Illness





- Reason for Consult


acute renal failure, metabolic acidosis





- History of Present Illness





Reason for consultation: Acute kidney injury





History of present illness:


Patient is a 49-year-old female seen in renal consultation for acute kidney 

injury.  Patient's creatinine on admission was 3.55 and is 3.52 today.  Unknown 

baseline renal function.  Patient was brought to the hospital by the EMS due to 

altered mental status.  She was found to be unresponsive.  She lives with a frie

nd.  Patient had taken trazodone the morning of admission and was later found to

be unresponsive on the ground covered with a blanket.  Her blood sugar was low 

and she received a dose of dextrose at the EMS.  She is currently intubated and 

sedated.  Patient was noted to be severely acidotic with a bicarb level that was

less than 5.  Patient's potassium level was 6.6 which was medically treated.  It

was 5.4 this morning.  She is currently maintained on bicarb drip along with 

high-dose Levophed and vasopressin.  Patient is oliguric.  Subsequently the 

patient had cardiac arrest in the ER.  She she underwent chest compressions and 

was also given a dose of epinephrine.  He was subsequently transferred to the 

intensive care unit.  CAT scan of the abdomen and pelvis revealed a thickened:. 

She's being followed by surgery and is not a surgical candidate at this time.  

She is receiving IV antibiotics.





Vital signs: Currently on high-dose vasopressors.  She is tachycardic.  

Temperature 99.7F.


General: The patient appeared well nourished and normally developed. 


HEENT: Head exam is unremarkable. Neck is without jugular venous distension.  

Intubated.


LUNGS: Lungs are clear to auscultation and percussion. Breath sounds decreased.


HEART: Tachycardic.


ABDOMEN: No distention noted.


EXTREMITITES: No clubbing, cyanosis, or edema.





Past Medical History


Past Medical History: Unable to Obtain


History of Any Multi-Drug Resistant Organisms: Unobtainable


Past Surgical History: Unable to Obtain


Past Psychological History: Unable to Obtain


Smoking Status: Unknown if ever smoked


Past Alcohol Use History: Unable to Obtain


Past Drug Use History: Unable to Obtain





Medications and Allergies


                                Home Medications











 Medication  Instructions  Recorded  Confirmed  Type


 


Unable To Assess [Unable to Assess]  05/23/20 05/23/20 History








                                    Allergies











Allergy/AdvReac Type Severity Reaction Status Date / Time


 


Unable to Assess Allergy   Verified 05/23/20 12:38














Physical Exam


Vitals: 


                                   Vital Signs











  Temp Pulse Pulse Pulse Resp BP BP


 


 05/24/20 09:00   117 H    31 H  112/54 


 


 05/24/20 08:45   114 H    24  112/53 


 


 05/24/20 08:30   114 H    25 H  112/54 


 


 05/24/20 08:15   118 H    30 H  108/51 


 


 05/24/20 08:00  99.7 F H  113 H    24  105/53 


 


 05/24/20 07:45   115 H    24  109/48 


 


 05/24/20 07:39    122 H    


 


 05/24/20 07:30   112 H    23  105/45 


 


 05/24/20 07:15   112 H    25 H  103/45 


 


 05/24/20 07:00   113 H    24  102/44 


 


 05/24/20 06:45   114 H    22  106/45 


 


 05/24/20 06:30   114 H    23  96/46 


 


 05/24/20 06:15   113 H    24  89/34 


 


 05/24/20 06:00   111 H    23  102/45 


 


 05/24/20 05:45   118 H    23  102/43 


 


 05/24/20 05:30   117 H    24  105/44 


 


 05/24/20 05:15   118 H    24  104/51 


 


 05/24/20 05:00   121 H    23  93/47 


 


 05/24/20 04:45   123 H    23  91/60 


 


 05/24/20 04:30   123 H    21  92/42 


 


 05/24/20 04:15   122 H    27 H  92/42 


 


 05/24/20 04:00  99.3 F  121 H  122 H   28 H  88/43 


 


 05/24/20 03:45   120 H    25 H  90/38 


 


 05/24/20 03:30   120 H    31 H  86/45 


 


 05/24/20 03:15   123 H    17  99/41 


 


 05/24/20 03:00   123 H    32 H  94/48 


 


 05/24/20 02:45   123 H    36 H  97/51 


 


 05/24/20 02:30   123 H    36 H  88/50 


 


 05/24/20 02:15   126 H    31 H  92/48 


 


 05/24/20 02:00   125 H    31 H  80/58 


 


 05/24/20 01:45   123 H    29 H  97/47 


 


 05/24/20 01:30   123 H    29 H  95/51 


 


 05/24/20 01:15   122 H    23  88/55 


 


 05/24/20 01:00  99.0 F  122 H    30 H  88/55 


 


 05/24/20 00:45   121 H    29 H  96/52 


 


 05/24/20 00:30      28 H  84/53 


 


 05/24/20 00:15   116 H    23  92/49 


 


 05/24/20 00:14   116 H    23  92/49 


 


 05/24/20 00:00  98.2 F  115 H  114 H   25 H  90/43 


 


 05/23/20 23:45   114 H    23  93/48 


 


 05/23/20 23:41  97.5 F L  114 H    31 H  93/50 


 


 05/23/20 23:30   113 H    22  96/47 


 


 05/23/20 23:15   113 H    22  93/50 


 


 05/23/20 23:11  97.3 F L  114 H    30 H  105/45 


 


 05/23/20 23:01  96.4 F L  112 H    29 H  93/50 


 


 05/23/20 23:00   112 H    22  94/46 


 


 05/23/20 22:45   112 H    44 H  98/56 


 


 05/23/20 22:30  96.4 F L  113 H    12  99/55 


 


 05/23/20 22:15   108 H    32 H  93/49 


 


 05/23/20 22:00   102 H    26 H  104/50 


 


 05/23/20 21:45   106 H    29 H  105/58 


 


 05/23/20 21:30   102 H    20  96/50 


 


 05/23/20 21:24  94.5 F L  103 H    29 H  96/50 


 


 05/23/20 21:15   104 H    26 H  103/53 


 


 05/23/20 21:07  94.5 F L  102 H    26 H  103/53 


 


 05/23/20 21:00  93.9 F L  103 H    24  107/54 


 


 05/23/20 20:57  34.4 F L  103 H    26 H  100/52 


 


 05/23/20 20:45  34.6 F L  103 H    24  104/49 


 


 05/23/20 20:30   102 H    24  92/50 


 


 05/23/20 20:27  94.4 F L  105 H    24  99/50 


 


 05/23/20 20:15  34.1 F L  103 H    22  92/49 


 


 05/23/20 20:05  92.8 F L  101 H    22  92/49 


 


 05/23/20 20:00  92.8 F L  111 H  102 H   22  85/48 


 


 05/23/20 19:57  92.8 F L  102 H    20  94/57 


 


 05/23/20 19:45   92    20  84/46 


 


 05/23/20 19:30   91    27 H  84/46 


 


 05/23/20 19:27  92.8 F L  103 H    20  85/48 


 


 05/23/20 19:17  92.8 F L  105 H    21  84/46 


 


 05/23/20 19:15   89    25 H  76/44 


 


 05/23/20 19:00   87    19  80/44 


 


 05/23/20 18:45   89    17  91/52 


 


 05/23/20 18:30   92    9 L  94/54 


 


 05/23/20 18:15   93    11 L  120/66 


 


 05/23/20 17:40  94.2 F L  102 H    14  128/67 


 


 05/23/20 17:30   101 H    23  136/69 


 


 05/23/20 17:20   100    11 L  121/71 


 


 05/23/20 17:10   105 H    22  135/72 


 


 05/23/20 17:00   104 H    18  128/72 


 


 05/23/20 16:50   105 H    16  132/70 


 


 05/23/20 16:40   105 H    14  136/75 


 


 05/23/20 16:30   108 H    19  150/80 


 


 05/23/20 16:20   109 H    18  144/80 


 


 05/23/20 16:10   108 H    18  140/77 


 


 05/23/20 16:00   106 H    18  126/78 


 


 05/23/20 15:50   103 H    20  128/76 


 


 05/23/20 15:40   101 H    14  116/73 


 


 05/23/20 15:39  90 F L  100    17  116/73 


 


 05/23/20 15:30   105 H    15  115/82 


 


 05/23/20 15:20   98    13  106/60 


 


 05/23/20 15:15  90 F L  101 H    17  111/65 


 


 05/23/20 15:10   96    16  102/46 


 


 05/23/20 15:05   98    16  107/43 


 


 05/23/20 15:01  90 F L  96    17  106/52 


 


 05/23/20 15:00   95    17  92/46 


 


 05/23/20 14:50   84    14  96/49 


 


 05/23/20 14:40   89    20  90/45 


 


 05/23/20 14:30       96/45 


 


 05/23/20 14:20   88    16  97/45 


 


 05/23/20 14:10   85    15  93/44 


 


 05/23/20 14:02   85    17  88/40 


 


 05/23/20 13:26  90.1 F L    92  14   93/44


 


 05/23/20 12:38   80    22  65/37 














  Pulse Ox


 


 05/24/20 09:00  98


 


 05/24/20 08:45  99


 


 05/24/20 08:30  99


 


 05/24/20 08:15  99


 


 05/24/20 08:00  98


 


 05/24/20 07:45  99


 


 05/24/20 07:39 


 


 05/24/20 07:30  98


 


 05/24/20 07:15  99


 


 05/24/20 07:00  99


 


 05/24/20 06:45  97


 


 05/24/20 06:30  99


 


 05/24/20 06:15  99


 


 05/24/20 06:00  100


 


 05/24/20 05:45  100


 


 05/24/20 05:30  98


 


 05/24/20 05:15  99


 


 05/24/20 05:00  99


 


 05/24/20 04:45 


 


 05/24/20 04:30 


 


 05/24/20 04:15  98


 


 05/24/20 04:00  98


 


 05/24/20 03:45  98


 


 05/24/20 03:30  98


 


 05/24/20 03:15  97


 


 05/24/20 03:00  97


 


 05/24/20 02:45  97


 


 05/24/20 02:30  97


 


 05/24/20 02:15  97


 


 05/24/20 02:00  97


 


 05/24/20 01:45  97


 


 05/24/20 01:30  97


 


 05/24/20 01:15  97


 


 05/24/20 01:00  98


 


 05/24/20 00:45  98


 


 05/24/20 00:30  98


 


 05/24/20 00:15  98


 


 05/24/20 00:14  98


 


 05/24/20 00:00  98


 


 05/23/20 23:45  98


 


 05/23/20 23:41  98


 


 05/23/20 23:30  98


 


 05/23/20 23:15  99


 


 05/23/20 23:11  98


 


 05/23/20 23:01  99


 


 05/23/20 23:00  99


 


 05/23/20 22:45  99


 


 05/23/20 22:30  99


 


 05/23/20 22:15  99


 


 05/23/20 22:00  99


 


 05/23/20 21:45  99


 


 05/23/20 21:30  98


 


 05/23/20 21:24  99


 


 05/23/20 21:15  99


 


 05/23/20 21:07  98


 


 05/23/20 21:00  99


 


 05/23/20 20:57  99


 


 05/23/20 20:45  98


 


 05/23/20 20:30  99


 


 05/23/20 20:27 


 


 05/23/20 20:15  99


 


 05/23/20 20:05  100


 


 05/23/20 20:00  99


 


 05/23/20 19:57  100


 


 05/23/20 19:45  99


 


 05/23/20 19:30  99


 


 05/23/20 19:27  100


 


 05/23/20 19:17  98


 


 05/23/20 19:15  99


 


 05/23/20 19:00  99


 


 05/23/20 18:45  99


 


 05/23/20 18:30  99


 


 05/23/20 18:15  99


 


 05/23/20 17:40 


 


 05/23/20 17:30  100


 


 05/23/20 17:20  100


 


 05/23/20 17:10  100


 


 05/23/20 17:00  99


 


 05/23/20 16:50  100


 


 05/23/20 16:40  100


 


 05/23/20 16:30  100


 


 05/23/20 16:20  100


 


 05/23/20 16:10  100


 


 05/23/20 16:00  100


 


 05/23/20 15:50  100


 


 05/23/20 15:40  100


 


 05/23/20 15:39 


 


 05/23/20 15:30  100


 


 05/23/20 15:20  100


 


 05/23/20 15:15  100


 


 05/23/20 15:10  100


 


 05/23/20 15:05  100


 


 05/23/20 15:01 


 


 05/23/20 15:00  100


 


 05/23/20 14:50  100


 


 05/23/20 14:40  100


 


 05/23/20 14:30 


 


 05/23/20 14:20  100


 


 05/23/20 14:10  100


 


 05/23/20 14:02  100


 


 05/23/20 13:26  100


 


 05/23/20 12:38  96








                                Intake and Output











 05/23/20 05/24/20 05/24/20





 22:59 06:59 14:59


 


Intake Total 4010.030 1442.620 509


 


Output Total 45 210 10


 


Balance 5817.128 7843.620 499


 


Intake:   


 


   1274 459


 


    Dextrose 5% in Water 1, 550 1200 450





    000 ml @ 150 mls/hr IV .   





    Q7H40M PATRICIA with Sodium   





    Bicarb (1 Meq/ml) 150 ml   





    Rx#:[id number]   


 


    Sodium bicarbonate  50 


 


    pressure bag  24 9


 


  Intake, IV Titration 33.327 479.620 50





  Amount   


 


    Midazolam HCl 50 mg In 6.25 12.333 50





    Sodium Chloride 0.9% 40   





    ml @ 1 MG/HR 1 mls/hr IV   





    .Q24H PATRICIA Rx#:[id number]   


 


    Norepinephrine 32 mg In 27.077 221.870 





    Sodium Chloride 0.9% 218   





    ml @ 0.05 MCG/KG/MIN 1.   





    914 mls/hr IV .Q24H ONE   





    Rx#:[id number]   


 


    Octreotide 500 mcg In  245.417 





    Sodium Chloride 0.9% 250   





    ml @ 50 MCG/HR 25 mls/hr   





    IV .Q10H [facility] Rx#:   





    [id number]   


 


  Blood Product 834 591 


 


    Ffp 24 Cpd  Unit  275 





    [id number]   


 


    Ffp 24 Cpd  Unit 0 316 





    [id number]   


 


    Platelet Irr Pheresis 2 214  





    Acda  Unit [id number]   


 


     As-1  Unit 310  





    [id number]   


 


     As-3  Unit 310  





    [id number]   


 


Output:   


 


  Gastric Drainage  200 


 


  Urine 45 10 10


 


Other:   


 


  Voiding Method Indwelling Catheter Indwelling Catheter Indwelling Catheter


 


  # Voids  0 0


 


  Weight  84.5 kg 








                         ABP, PAP, CO, CI - Last 8 Hours











Arterial Blood Pressure        137/51


 


Arterial Blood Pressure        134/48


 


Arterial Blood Pressure        133/46


 


Arterial Blood Pressure        132/45


 


Arterial Blood Pressure        130/45


 


Arterial Blood Pressure        126/43


 


Arterial Blood Pressure        126/42


 


Arterial Blood Pressure        122/42


 


Arterial Blood Pressure        120/39


 


Arterial Blood Pressure        20/20


 


Arterial Blood Pressure        127/44


 


Arterial Blood Pressure        118/45


 


Arterial Blood Pressure        82/33


 


Arterial Blood Pressure        127/45


 


Arterial Blood Pressure        121/43


 


Arterial Blood Pressure        121/43


 


Arterial Blood Pressure        120/44


 


Arterial Blood Pressure        143/45


 


Arterial Blood Pressure        123/49


 


Arterial Blood Pressure        122/46


 


Arterial Blood Pressure        121/45


 


Arterial Blood Pressure        117/44


 


Arterial Blood Pressure        113/45


 


Arterial Blood Pressure        105/46


 


Arterial Blood Pressure        105/46

















Results





- Lab Results


                             Most recent lab results











ABG pH  7.28  (7.35-7.45)  L  05/24/20  07:48    


 


ABG pCO2  22 mmHg (35-45)  L  05/24/20  07:48    


 


ABG pO2  97 mmHg ()   05/24/20  07:48    


 


ABG HCO3  10 mmol/L (21-25)  L*  05/24/20  07:48    


 


ABG O2 Saturation  97.1 % (94-97)  H  05/24/20  07:48    


 


Calcium  6.2 mg/dL (8.4-10.2)  L*  05/24/20  06:45    














                                 05/24/20 06:45





                                 05/24/20 06:45





Assessment and Plan


Plan: 





Assessment:


1.  Acute kidney injury secondary to ATN secondary to septic shock.  Creatinine 

3.52 today.  CAT scan revealed fullness of the collecting systems.  Unknown 

baseline renal function.


2.  Hyperkalemia secondary to acute kidney injury, metabolic acidosis and GI 

bleed.  Improved with medical management.


3.  Severe metabolic acidosis secondary to acute kidney injury and lactic 

acidosis. Volatile screen negative. 


4.  Hypocalcemia secondary to acute kidney injury.


5.  Acute GI bleed status post blood transfusion and IV DDAVP.  GI following.





Plan:


Maintain isotonic bicarb drip at 150 mL an hour.


Wean FiO2 and vasopressors.


Follow cultures.


Repeat BMP at 5 PM today.


Check phosphorus level.


Calcium was replaced.


Check renal ultrasound.


Continue to assess daily for the need for renal replacement therapy.  At this 

time she is hemodynamically very unstable to tolerate renal replacement therapy.





Thank you for the consultation.  I will continue to follow the patient with you 

during her hospital stay.

## 2020-05-24 NOTE — CONS
CONSULTATION



DATE OF CONSULTATION:

May 24, 2020.



REASON FOR CONSULTATION:

Acute upper GI bleed.



HISTORY OF PRESENT ILLNESS:

The patient is a 49-year-old white female with history of alcoholism, was brought in to

the emergency room by her boyfriend and as per the notes, she was complaining of some

back pain yesterday morning and took some trazodone.  Following that, she became

unresponsive around 8:30 am.  She came to the emergency room was unresponsive by the

EMS.  In the ER, she had cardiac arrest.  She was CODED and intubated and while they

were putting an NG tube, there was about 75 mL of bright red blood per NG tube and

hence I was consulted for upper GI bleed.  The patient subsequently was transferred to

the intensive care unit.  She remains on pressors with Levophed and vasopressin at this

time.  She was also started on Sandostatin however through the night, the patient

continues to remain hemodynamically unstable.  She is on pressor support.  She had an

NG tube in there which she had about 200 mL of coffee-ground material.



She did have a CT of the abdomen and pelvis done in the emergency room that did show

diffuse thickening of the colon.  Possibility of ischemic colitis was considered by the

radiologist.  Dr. Rush was consulted who recommended conservative approach at the

present time.



The patient is currently intubated, sedated on the vent, on pressors.



PAST MEDICAL HISTORY:

Alcohol use.



PAST SURGICAL HISTORY:

Unable to obtain.



MEDICATIONS:

At home unable to obtain.



SOCIAL HISTORY:

Unable to obtain.  Family history unable to obtain.



REVIEW OF SYSTEMS:

Unable to obtain.



ALLERGIES:

Unable to obtain.



PHYSICAL EXAMINATION:

She remains on the vent, hemodynamically unstable.

Vital signs:  Blood pressure is 112/53, pulse rate 114, temperature 99.7.

HEENT examination unremarkable.  Conjunctivae pink.  Sclerae anicteric.  Oral cavity no

lesions.

NECK:  No JVD or lymph node enlargement.

CHEST was clear to auscultation.

HEART:  Regular rate and rhythm.

ABDOMEN was distended.  It was firm.  No free fluid noted.

EXTREMITIES no pedal edema.

NEURO:  She is sedated.



LABS:

Done at the time of admission to the hospital yesterday WBC 5.4, hemoglobin 8.5, and

platelets 81.  Today hemoglobin is 8.5, and platelets are 84,000.  Sodium 145,

potassium 5.4, chloride 107, CO2 10, BUN 12, creatinine 3.52, T-bilirubin is 1.4, AST

89, ALT is 53.  Ammonia was 65.  It was 199 yesterday.



IMPRESSION:

1. Acute respiratory failure on the vent and sedated.

2. Hemodynamic instability remains on pressor support.

3. Severe metabolic acidosis.

4. Acute upper gastrointestinal bleed after NG tube was placed, probably related to NG

    tube trauma.  The patient did not have significant bleeding since being in the

    hospital.  However, she did present with a hemoglobin of 6.3 g/dL and most likely

    she may be having occult gastrointestinal blood loss/subacute bleed.  Presently on

    IV Sandostatin drip for possible esophageal variceal bleeding.

5. Elevated LFTs consistent with alcoholic liver disease.

6. Diffuse abdominal pain and questionable ischemic colitis on CT scan.  Surgery

    following the patient closely.

7. Hepatic encephalopathy on oral lactulose 30 mL twice daily.



RECOMMENDATIONS:

1. Continue with broad-spectrum antibiotics.

2. Management as per ICU.

3. No plans for any endoscopic intervention at the present time.

4. Continue Protonix 40 mg twice daily.

5. Continue lactulose and will add oral Xifaxan 550 mg twice a day via the NG tube.

6. Monitor labs on a daily basis.  We will follow with you closely.

Thank you for this consultation.





MMODL / IJN: [id number] / Job#: [id number]

## 2020-05-24 NOTE — P.PN
Subjective


Progress Note Date: 05/24/20





patient continues to be on high dose pressors.  She is unstable.  No bowel 

movement reported. 





Objective





- Vital Signs


Vital signs: 


                                   Vital Signs











Temp  99.7 F H  05/24/20 08:00


 


Pulse  122 H  05/24/20 11:38


 


Resp  29 H  05/24/20 11:30


 


BP  119/62   05/24/20 11:30


 


Pulse Ox  98   05/24/20 11:30








                                 Intake & Output











 05/23/20 05/24/20 05/24/20





 18:59 06:59 18:59


 


Intake Total 264.804 1075.863 979.450


 


Output Total 10 255 30


 


Balance 862.564 9521.863 949.450


 


Weight 81.647 kg 84.5 kg 


 


Intake:   


 


  IV  1974 918


 


    Dextrose 5% in Water 1,  1750 900





    000 ml @ 150 mls/hr IV .   





    Q7H40M PATRICIA with Sodium   





    Bicarb (1 Meq/ml) 150 ml   





    Rx#:[id number]   


 


    Sodium bicarbonate IVP  200 


 


    pressure bag  24 18


 


  Intake, IV Titration 19.137 494.863 61.450





  Amount   


 


    Midazolam HCl 50 mg In  18.583 50





    Sodium Chloride 0.9% 40   





    ml @ 1 MG/HR 1 mls/hr IV   





    .Q24H PATRICIA Rx#:[id number]   


 


    Norepinephrine 32 mg In 19.137 230.863 





    Sodium Chloride 0.9% 218   





    ml @ 0.05 MCG/KG/MIN 1.   





    914 mls/hr IV .Q24H ONE   





    Rx#:[id number]   


 


    Octreotide 500 mcg In  245.417 





    Sodium Chloride 0.9% 250   





    ml @ 50 MCG/HR 25 mls/hr   





    IV .Q10H PATRICIA Rx#:   





    [id number]   


 


    Propofol 1,000 mg In   11.450





    Empty Bag 1 bag @ Titrate   





    IV .Q0M PATRICIA Rx#:   





    [id number]   


 


  Blood Product 310 1115 


 


    Ffp 24 Cpd  Unit  275 





    [id number]   


 


    Ffp 24 Cpd  Unit  316 





    [id number]   


 


    Platelet Irr Pheresis 2  214 





    Acda  Unit [id number]   


 


    Rc As-1  Unit  310 





    [id number]   


 


     As-3  Unit 310  





    [id number]   


 


Output:   


 


  Gastric Drainage  200 


 


  Urine 10 55 30


 


    Uretheral (Foster) 10  


 


Other:   


 


  Voiding Method  Indwelling Catheter Indwelling Catheter


 


  # Voids  0 0








                       ABP, PAP, CO, CI - Last Documented











Arterial Blood Pressure        134/58

















- Constitutional


Constitutional Comment(s): 





intubated and sedated





- Respiratory


Details: 





nonlabored





- Cardiovascular


Details: 





tachy





- Gastrointestinal


Gastrointestinal Comment(s): 





Distended, nontender. no rigidity





- Psychiatric


Psychiatric Comment(s): 





intubated and sedated





- Labs


CBC & Chem 7: 


                                 05/24/20 06:45





                                 05/24/20 06:45


Labs: 


                  Abnormal Lab Results - Last 24 Hours (Table)











  05/23/20 05/23/20 05/23/20 Range/Units





  08:18 08:18 12:14 


 


RBC     (3.80-5.40)  m/uL


 


Hgb     (11.4-16.0)  gm/dL


 


Hct     (34.0-46.0)  %


 


MCV     (80.0-100.0)  fL


 


MCHC     (31.0-37.0)  g/dL


 


RDW     (11.5-15.5)  %


 


Plt Count     (150-450)  k/uL


 


Lymphocytes # (Manual)     (1.0-4.8)  k/uL


 


Macrocytosis     


 


PT     (9.0-12.0)  sec


 


INR     (<1.2)  


 


APTT     (22.0-30.0)  sec


 


ABG pH     (7.35-7.45)  


 


ABG pCO2     (35-45)  mmHg


 


ABG pO2     ()  mmHg


 


ABG HCO3     (21-25)  mmol/L


 


ABG Total CO2     (19-24)  mmol/L


 


ABG O2 Saturation     (94-97)  %


 


ABG Lactic Acid     (0.5-1.6)  mmol/L


 


VBG pH     (7.31-7.41)  


 


VBG pCO2     (37-51)  mmHg


 


VBG HCO3     (24-28)  mmol/L


 


Potassium     (3.5-5.1)  mmol/L


 


Chloride     ()  mmol/L


 


Carbon Dioxide     (22-30)  mmol/L


 


Creatinine     (0.52-1.04)  mg/dL


 


Glucose     (74-99)  mg/dL


 


POC Glucose (mg/dL)    174 H  (75-99)  mg/dL


 


Osmolality   382 H*   (280-301)  mosm/kg


 


Plasma Lactic Acid Scott     (0.7-2.0)  mmol/L


 


Calcium     (8.4-10.2)  mg/dL


 


Total Bilirubin     (0.2-1.3)  mg/dL


 


AST     (14-36)  U/L


 


ALT     (4-34)  U/L


 


Alkaline Phosphatase     ()  U/L


 


Ammonia     (<30)  umol/L


 


Creatine Kinase     ()  U/L


 


Total Protein     (6.3-8.2)  g/dL


 


Albumin     (3.5-5.0)  g/dL


 


Urine Appearance     (Clear)  


 


Urine Protein     (Negative)  


 


Urine Glucose (UA)     (Negative)  


 


Urine Blood     (Negative)  


 


Ur Leukocyte Esterase     (Negative)  


 


Urine RBC     (0-5)  /hpf


 


Urine WBC     (0-5)  /hpf


 


Urine WBC Clumps     (None)  /hpf


 


Amorphous Sediment     (None)  /hpf


 


Urine Bacteria     (None)  /hpf


 


Hyaline Casts     (0-2)  /lpf


 


Urine Mucus     (None)  /hpf


 


Serum Alcohol     mg/dL


 


Ethyl Alcohol Screen  191 H    (Negative)  mg/dL


 


Crossmatch     














  05/23/20 05/23/20 05/23/20 Range/Units





  12:15 12:15 12:15 


 


RBC   2.05 L   (3.80-5.40)  m/uL


 


Hgb   6.3 L*   (11.4-16.0)  gm/dL


 


Hct   23.7 L   (34.0-46.0)  %


 


MCV   115.7 H   (80.0-100.0)  fL


 


MCHC   26.5 L   (31.0-37.0)  g/dL


 


RDW   19.9 H   (11.5-15.5)  %


 


Plt Count   48 L   (150-450)  k/uL


 


Lymphocytes # (Manual)     (1.0-4.8)  k/uL


 


Macrocytosis   Marked A   


 


PT    16.9 H  (9.0-12.0)  sec


 


INR    1.7 H  (<1.2)  


 


APTT    42.3 H  (22.0-30.0)  sec


 


ABG pH     (7.35-7.45)  


 


ABG pCO2     (35-45)  mmHg


 


ABG pO2     ()  mmHg


 


ABG HCO3     (21-25)  mmol/L


 


ABG Total CO2     (19-24)  mmol/L


 


ABG O2 Saturation     (94-97)  %


 


ABG Lactic Acid     (0.5-1.6)  mmol/L


 


VBG pH  6.84 L*    (7.31-7.41)  


 


VBG pCO2  25 L    (37-51)  mmHg


 


VBG HCO3  4 L*    (24-28)  mmol/L


 


Potassium     (3.5-5.1)  mmol/L


 


Chloride     ()  mmol/L


 


Carbon Dioxide     (22-30)  mmol/L


 


Creatinine     (0.52-1.04)  mg/dL


 


Glucose     (74-99)  mg/dL


 


POC Glucose (mg/dL)     (75-99)  mg/dL


 


Osmolality     (280-301)  mosm/kg


 


Plasma Lactic Acid Scott     (0.7-2.0)  mmol/L


 


Calcium     (8.4-10.2)  mg/dL


 


Total Bilirubin     (0.2-1.3)  mg/dL


 


AST     (14-36)  U/L


 


ALT     (4-34)  U/L


 


Alkaline Phosphatase     ()  U/L


 


Ammonia     (<30)  umol/L


 


Creatine Kinase     ()  U/L


 


Total Protein     (6.3-8.2)  g/dL


 


Albumin     (3.5-5.0)  g/dL


 


Urine Appearance     (Clear)  


 


Urine Protein     (Negative)  


 


Urine Glucose (UA)     (Negative)  


 


Urine Blood     (Negative)  


 


Ur Leukocyte Esterase     (Negative)  


 


Urine RBC     (0-5)  /hpf


 


Urine WBC     (0-5)  /hpf


 


Urine WBC Clumps     (None)  /hpf


 


Amorphous Sediment     (None)  /hpf


 


Urine Bacteria     (None)  /hpf


 


Hyaline Casts     (0-2)  /lpf


 


Urine Mucus     (None)  /hpf


 


Serum Alcohol     mg/dL


 


Ethyl Alcohol Screen     (Negative)  mg/dL


 


Crossmatch     














  05/23/20 05/23/20 05/23/20 Range/Units





  12:15 12:15 12:15 


 


RBC     (3.80-5.40)  m/uL


 


Hgb     (11.4-16.0)  gm/dL


 


Hct     (34.0-46.0)  %


 


MCV     (80.0-100.0)  fL


 


MCHC     (31.0-37.0)  g/dL


 


RDW     (11.5-15.5)  %


 


Plt Count     (150-450)  k/uL


 


Lymphocytes # (Manual)     (1.0-4.8)  k/uL


 


Macrocytosis     


 


PT     (9.0-12.0)  sec


 


INR     (<1.2)  


 


APTT     (22.0-30.0)  sec


 


ABG pH     (7.35-7.45)  


 


ABG pCO2     (35-45)  mmHg


 


ABG pO2     ()  mmHg


 


ABG HCO3     (21-25)  mmol/L


 


ABG Total CO2     (19-24)  mmol/L


 


ABG O2 Saturation     (94-97)  %


 


ABG Lactic Acid     (0.5-1.6)  mmol/L


 


VBG pH     (7.31-7.41)  


 


VBG pCO2     (37-51)  mmHg


 


VBG HCO3     (24-28)  mmol/L


 


Potassium  6.6 H*    (3.5-5.1)  mmol/L


 


Chloride  111 H    ()  mmol/L


 


Carbon Dioxide  <5 L*    (22-30)  mmol/L


 


Creatinine  3.55 H    (0.52-1.04)  mg/dL


 


Glucose  167 H    (74-99)  mg/dL


 


POC Glucose (mg/dL)     (75-99)  mg/dL


 


Osmolality     (280-301)  mosm/kg


 


Plasma Lactic Acid Scott   20.4 H*   (0.7-2.0)  mmol/L


 


Calcium  7.5 L    (8.4-10.2)  mg/dL


 


Total Bilirubin     (0.2-1.3)  mg/dL


 


AST  178 H    (14-36)  U/L


 


ALT  43 H    (4-34)  U/L


 


Alkaline Phosphatase  215 H    ()  U/L


 


Ammonia   199 H   (<30)  umol/L


 


Creatine Kinase  374 H    ()  U/L


 


Total Protein  5.3 L    (6.3-8.2)  g/dL


 


Albumin  2.5 L    (3.5-5.0)  g/dL


 


Urine Appearance     (Clear)  


 


Urine Protein     (Negative)  


 


Urine Glucose (UA)     (Negative)  


 


Urine Blood     (Negative)  


 


Ur Leukocyte Esterase     (Negative)  


 


Urine RBC     (0-5)  /hpf


 


Urine WBC     (0-5)  /hpf


 


Urine WBC Clumps     (None)  /hpf


 


Amorphous Sediment     (None)  /hpf


 


Urine Bacteria     (None)  /hpf


 


Hyaline Casts     (0-2)  /lpf


 


Urine Mucus     (None)  /hpf


 


Serum Alcohol  274 H*    mg/dL


 


Ethyl Alcohol Screen     (Negative)  mg/dL


 


Crossmatch    See Detail  














  05/23/20 05/23/20 05/23/20 Range/Units





  13:25 14:22 14:29 


 


RBC     (3.80-5.40)  m/uL


 


Hgb     (11.4-16.0)  gm/dL


 


Hct     (34.0-46.0)  %


 


MCV     (80.0-100.0)  fL


 


MCHC     (31.0-37.0)  g/dL


 


RDW     (11.5-15.5)  %


 


Plt Count     (150-450)  k/uL


 


Lymphocytes # (Manual)     (1.0-4.8)  k/uL


 


Macrocytosis     


 


PT     (9.0-12.0)  sec


 


INR     (<1.2)  


 


APTT     (22.0-30.0)  sec


 


ABG pH  6.81 L*    (7.35-7.45)  


 


ABG pCO2  22 L    (35-45)  mmHg


 


ABG pO2  >400 H    ()  mmHg


 


ABG HCO3  4 L*    (21-25)  mmol/L


 


ABG Total CO2  4 L    (19-24)  mmol/L


 


ABG O2 Saturation  99.3 H    (94-97)  %


 


ABG Lactic Acid     (0.5-1.6)  mmol/L


 


VBG pH     (7.31-7.41)  


 


VBG pCO2     (37-51)  mmHg


 


VBG HCO3     (24-28)  mmol/L


 


Potassium     (3.5-5.1)  mmol/L


 


Chloride     ()  mmol/L


 


Carbon Dioxide     (22-30)  mmol/L


 


Creatinine     (0.52-1.04)  mg/dL


 


Glucose     (74-99)  mg/dL


 


POC Glucose (mg/dL)    210 H  (75-99)  mg/dL


 


Osmolality     (280-301)  mosm/kg


 


Plasma Lactic Acid Scott     (0.7-2.0)  mmol/L


 


Calcium     (8.4-10.2)  mg/dL


 


Total Bilirubin     (0.2-1.3)  mg/dL


 


AST     (14-36)  U/L


 


ALT     (4-34)  U/L


 


Alkaline Phosphatase     ()  U/L


 


Ammonia     (<30)  umol/L


 


Creatine Kinase     ()  U/L


 


Total Protein     (6.3-8.2)  g/dL


 


Albumin     (3.5-5.0)  g/dL


 


Urine Appearance   Turbid H   (Clear)  


 


Urine Protein   2+ H   (Negative)  


 


Urine Glucose (UA)   Trace H   (Negative)  


 


Urine Blood   Moderate H   (Negative)  


 


Ur Leukocyte Esterase   Large H   (Negative)  


 


Urine RBC   82 H   (0-5)  /hpf


 


Urine WBC   >182 H   (0-5)  /hpf


 


Urine WBC Clumps   Many H   (None)  /hpf


 


Amorphous Sediment   Rare H   (None)  /hpf


 


Urine Bacteria   Many H   (None)  /hpf


 


Hyaline Casts   73 H   (0-2)  /lpf


 


Urine Mucus   Many H   (None)  /hpf


 


Serum Alcohol     mg/dL


 


Ethyl Alcohol Screen     (Negative)  mg/dL


 


Crossmatch     














  05/23/20 05/23/20 05/23/20 Range/Units





  16:26 17:57 20:00 


 


RBC     (3.80-5.40)  m/uL


 


Hgb     (11.4-16.0)  gm/dL


 


Hct     (34.0-46.0)  %


 


MCV     (80.0-100.0)  fL


 


MCHC     (31.0-37.0)  g/dL


 


RDW     (11.5-15.5)  %


 


Plt Count     (150-450)  k/uL


 


Lymphocytes # (Manual)     (1.0-4.8)  k/uL


 


Macrocytosis     


 


PT     (9.0-12.0)  sec


 


INR     (<1.2)  


 


APTT     (22.0-30.0)  sec


 


ABG pH  6.89 L*    (7.35-7.45)  


 


ABG pCO2  19 L*    (35-45)  mmHg


 


ABG pO2  165 H    ()  mmHg


 


ABG HCO3  4 L*    (21-25)  mmol/L


 


ABG Total CO2  4 L    (19-24)  mmol/L


 


ABG O2 Saturation  98.1 H    (94-97)  %


 


ABG Lactic Acid     (0.5-1.6)  mmol/L


 


VBG pH     (7.31-7.41)  


 


VBG pCO2     (37-51)  mmHg


 


VBG HCO3     (24-28)  mmol/L


 


Potassium     (3.5-5.1)  mmol/L


 


Chloride     ()  mmol/L


 


Carbon Dioxide     (22-30)  mmol/L


 


Creatinine     (0.52-1.04)  mg/dL


 


Glucose     (74-99)  mg/dL


 


POC Glucose (mg/dL)   135 H   (75-99)  mg/dL


 


Osmolality     (280-301)  mosm/kg


 


Plasma Lactic Acid Scott    18.1 H*  (0.7-2.0)  mmol/L


 


Calcium     (8.4-10.2)  mg/dL


 


Total Bilirubin     (0.2-1.3)  mg/dL


 


AST     (14-36)  U/L


 


ALT     (4-34)  U/L


 


Alkaline Phosphatase     ()  U/L


 


Ammonia     (<30)  umol/L


 


Creatine Kinase     ()  U/L


 


Total Protein     (6.3-8.2)  g/dL


 


Albumin     (3.5-5.0)  g/dL


 


Urine Appearance     (Clear)  


 


Urine Protein     (Negative)  


 


Urine Glucose (UA)     (Negative)  


 


Urine Blood     (Negative)  


 


Ur Leukocyte Esterase     (Negative)  


 


Urine RBC     (0-5)  /hpf


 


Urine WBC     (0-5)  /hpf


 


Urine WBC Clumps     (None)  /hpf


 


Amorphous Sediment     (None)  /hpf


 


Urine Bacteria     (None)  /hpf


 


Hyaline Casts     (0-2)  /lpf


 


Urine Mucus     (None)  /hpf


 


Serum Alcohol     mg/dL


 


Ethyl Alcohol Screen     (Negative)  mg/dL


 


Crossmatch     














  05/23/20 05/23/20 05/23/20 Range/Units





  20:46 22:25 22:25 


 


RBC   2.82 L   (3.80-5.40)  m/uL


 


Hgb   8.5 L D   (11.4-16.0)  gm/dL


 


Hct   28.8 L   (34.0-46.0)  %


 


MCV   102.2 H D   (80.0-100.0)  fL


 


MCHC   29.5 L   (31.0-37.0)  g/dL


 


RDW   19.9 H   (11.5-15.5)  %


 


Plt Count   81 L D   (150-450)  k/uL


 


Lymphocytes # (Manual)     (1.0-4.8)  k/uL


 


Macrocytosis     


 


PT     (9.0-12.0)  sec


 


INR     (<1.2)  


 


APTT     (22.0-30.0)  sec


 


ABG pH  7.12 L*    (7.35-7.45)  


 


ABG pCO2  22 L    (35-45)  mmHg


 


ABG pO2  122 H    ()  mmHg


 


ABG HCO3  7 L*    (21-25)  mmol/L


 


ABG Total CO2  8 L    (19-24)  mmol/L


 


ABG O2 Saturation  98.1 H    (94-97)  %


 


ABG Lactic Acid     (0.5-1.6)  mmol/L


 


VBG pH     (7.31-7.41)  


 


VBG pCO2     (37-51)  mmHg


 


VBG HCO3     (24-28)  mmol/L


 


Potassium    5.4 H  (3.5-5.1)  mmol/L


 


Chloride    109 H  ()  mmol/L


 


Carbon Dioxide    7 L*  (22-30)  mmol/L


 


Creatinine    3.03 H  (0.52-1.04)  mg/dL


 


Glucose    174 H  (74-99)  mg/dL


 


POC Glucose (mg/dL)     (75-99)  mg/dL


 


Osmolality     (280-301)  mosm/kg


 


Plasma Lactic Acid Scott     (0.7-2.0)  mmol/L


 


Calcium    6.6 L  (8.4-10.2)  mg/dL


 


Total Bilirubin    1.4 H  (0.2-1.3)  mg/dL


 


AST    289 H  (14-36)  U/L


 


ALT    53 H  (4-34)  U/L


 


Alkaline Phosphatase    241 H  ()  U/L


 


Ammonia     (<30)  umol/L


 


Creatine Kinase     ()  U/L


 


Total Protein    6.1 L  (6.3-8.2)  g/dL


 


Albumin    3.0 L  (3.5-5.0)  g/dL


 


Urine Appearance     (Clear)  


 


Urine Protein     (Negative)  


 


Urine Glucose (UA)     (Negative)  


 


Urine Blood     (Negative)  


 


Ur Leukocyte Esterase     (Negative)  


 


Urine RBC     (0-5)  /hpf


 


Urine WBC     (0-5)  /hpf


 


Urine WBC Clumps     (None)  /hpf


 


Amorphous Sediment     (None)  /hpf


 


Urine Bacteria     (None)  /hpf


 


Hyaline Casts     (0-2)  /lpf


 


Urine Mucus     (None)  /hpf


 


Serum Alcohol     mg/dL


 


Ethyl Alcohol Screen     (Negative)  mg/dL


 


Crossmatch     














  05/23/20 05/23/20 05/23/20 Range/Units





  22:25 22:26 22:50 


 


RBC     (3.80-5.40)  m/uL


 


Hgb     (11.4-16.0)  gm/dL


 


Hct     (34.0-46.0)  %


 


MCV     (80.0-100.0)  fL


 


MCHC     (31.0-37.0)  g/dL


 


RDW     (11.5-15.5)  %


 


Plt Count     (150-450)  k/uL


 


Lymphocytes # (Manual)     (1.0-4.8)  k/uL


 


Macrocytosis     


 


PT  14.5 H    (9.0-12.0)  sec


 


INR  1.5 H    (<1.2)  


 


APTT  30.6 H    (22.0-30.0)  sec


 


ABG pH    7.17 L*  (7.35-7.45)  


 


ABG pCO2    23 L  (35-45)  mmHg


 


ABG pO2    116 H  ()  mmHg


 


ABG HCO3    8 L*  (21-25)  mmol/L


 


ABG Total CO2    9 L  (19-24)  mmol/L


 


ABG O2 Saturation    97.9 H  (94-97)  %


 


ABG Lactic Acid     (0.5-1.6)  mmol/L


 


VBG pH     (7.31-7.41)  


 


VBG pCO2     (37-51)  mmHg


 


VBG HCO3     (24-28)  mmol/L


 


Potassium     (3.5-5.1)  mmol/L


 


Chloride     ()  mmol/L


 


Carbon Dioxide     (22-30)  mmol/L


 


Creatinine     (0.52-1.04)  mg/dL


 


Glucose     (74-99)  mg/dL


 


POC Glucose (mg/dL)   186 H   (75-99)  mg/dL


 


Osmolality     (280-301)  mosm/kg


 


Plasma Lactic Acid Scott     (0.7-2.0)  mmol/L


 


Calcium     (8.4-10.2)  mg/dL


 


Total Bilirubin     (0.2-1.3)  mg/dL


 


AST     (14-36)  U/L


 


ALT     (4-34)  U/L


 


Alkaline Phosphatase     ()  U/L


 


Ammonia     (<30)  umol/L


 


Creatine Kinase     ()  U/L


 


Total Protein     (6.3-8.2)  g/dL


 


Albumin     (3.5-5.0)  g/dL


 


Urine Appearance     (Clear)  


 


Urine Protein     (Negative)  


 


Urine Glucose (UA)     (Negative)  


 


Urine Blood     (Negative)  


 


Ur Leukocyte Esterase     (Negative)  


 


Urine RBC     (0-5)  /hpf


 


Urine WBC     (0-5)  /hpf


 


Urine WBC Clumps     (None)  /hpf


 


Amorphous Sediment     (None)  /hpf


 


Urine Bacteria     (None)  /hpf


 


Hyaline Casts     (0-2)  /lpf


 


Urine Mucus     (None)  /hpf


 


Serum Alcohol     mg/dL


 


Ethyl Alcohol Screen     (Negative)  mg/dL


 


Crossmatch     














  05/24/20 05/24/20 05/24/20 Range/Units





  02:32 02:35 02:35 


 


RBC   2.79 L   (3.80-5.40)  m/uL


 


Hgb   8.5 L   (11.4-16.0)  gm/dL


 


Hct   28.1 L   (34.0-46.0)  %


 


MCV   100.7 H   (80.0-100.0)  fL


 


MCHC   30.4 L   (31.0-37.0)  g/dL


 


RDW   20.1 H   (11.5-15.5)  %


 


Plt Count   97 L   (150-450)  k/uL


 


Lymphocytes # (Manual)     (1.0-4.8)  k/uL


 


Macrocytosis     


 


PT     (9.0-12.0)  sec


 


INR     (<1.2)  


 


APTT     (22.0-30.0)  sec


 


ABG pH     (7.35-7.45)  


 


ABG pCO2     (35-45)  mmHg


 


ABG pO2     ()  mmHg


 


ABG HCO3     (21-25)  mmol/L


 


ABG Total CO2     (19-24)  mmol/L


 


ABG O2 Saturation     (94-97)  %


 


ABG Lactic Acid     (0.5-1.6)  mmol/L


 


VBG pH     (7.31-7.41)  


 


VBG pCO2     (37-51)  mmHg


 


VBG HCO3     (24-28)  mmol/L


 


Potassium    5.7 H  (3.5-5.1)  mmol/L


 


Chloride     ()  mmol/L


 


Carbon Dioxide    9 L*  (22-30)  mmol/L


 


Creatinine     (0.52-1.04)  mg/dL


 


Glucose     (74-99)  mg/dL


 


POC Glucose (mg/dL)  229 H    (75-99)  mg/dL


 


Osmolality     (280-301)  mosm/kg


 


Plasma Lactic Acid Scott     (0.7-2.0)  mmol/L


 


Calcium     (8.4-10.2)  mg/dL


 


Total Bilirubin     (0.2-1.3)  mg/dL


 


AST     (14-36)  U/L


 


ALT     (4-34)  U/L


 


Alkaline Phosphatase     ()  U/L


 


Ammonia     (<30)  umol/L


 


Creatine Kinase     ()  U/L


 


Total Protein     (6.3-8.2)  g/dL


 


Albumin     (3.5-5.0)  g/dL


 


Urine Appearance     (Clear)  


 


Urine Protein     (Negative)  


 


Urine Glucose (UA)     (Negative)  


 


Urine Blood     (Negative)  


 


Ur Leukocyte Esterase     (Negative)  


 


Urine RBC     (0-5)  /hpf


 


Urine WBC     (0-5)  /hpf


 


Urine WBC Clumps     (None)  /hpf


 


Amorphous Sediment     (None)  /hpf


 


Urine Bacteria     (None)  /hpf


 


Hyaline Casts     (0-2)  /lpf


 


Urine Mucus     (None)  /hpf


 


Serum Alcohol     mg/dL


 


Ethyl Alcohol Screen     (Negative)  mg/dL


 


Crossmatch     














  05/24/20 05/24/20 05/24/20 Range/Units





  06:45 06:45 06:45 


 


RBC  2.76 L    (3.80-5.40)  m/uL


 


Hgb  8.5 L    (11.4-16.0)  gm/dL


 


Hct  27.4 L    (34.0-46.0)  %


 


MCV     (80.0-100.0)  fL


 


MCHC  30.9 L    (31.0-37.0)  g/dL


 


RDW  20.0 H    (11.5-15.5)  %


 


Plt Count  84 L    (150-450)  k/uL


 


Lymphocytes # (Manual)  0.98 L    (1.0-4.8)  k/uL


 


Macrocytosis     


 


PT     (9.0-12.0)  sec


 


INR     (<1.2)  


 


APTT     (22.0-30.0)  sec


 


ABG pH     (7.35-7.45)  


 


ABG pCO2     (35-45)  mmHg


 


ABG pO2     ()  mmHg


 


ABG HCO3     (21-25)  mmol/L


 


ABG Total CO2     (19-24)  mmol/L


 


ABG O2 Saturation     (94-97)  %


 


ABG Lactic Acid     (0.5-1.6)  mmol/L


 


VBG pH     (7.31-7.41)  


 


VBG pCO2     (37-51)  mmHg


 


VBG HCO3     (24-28)  mmol/L


 


Potassium   5.4 H   (3.5-5.1)  mmol/L


 


Chloride     ()  mmol/L


 


Carbon Dioxide   10 L   (22-30)  mmol/L


 


Creatinine   3.52 H   (0.52-1.04)  mg/dL


 


Glucose   231 H   (74-99)  mg/dL


 


POC Glucose (mg/dL)     (75-99)  mg/dL


 


Osmolality     (280-301)  mosm/kg


 


Plasma Lactic Acid Scott     (0.7-2.0)  mmol/L


 


Calcium   6.2 L*   (8.4-10.2)  mg/dL


 


Total Bilirubin   1.5 H   (0.2-1.3)  mg/dL


 


AST   355 H   (14-36)  U/L


 


ALT   50 H   (4-34)  U/L


 


Alkaline Phosphatase   221 H   ()  U/L


 


Ammonia    65 H  (<30)  umol/L


 


Creatine Kinase     ()  U/L


 


Total Protein   6.0 L   (6.3-8.2)  g/dL


 


Albumin   2.9 L   (3.5-5.0)  g/dL


 


Urine Appearance     (Clear)  


 


Urine Protein     (Negative)  


 


Urine Glucose (UA)     (Negative)  


 


Urine Blood     (Negative)  


 


Ur Leukocyte Esterase     (Negative)  


 


Urine RBC     (0-5)  /hpf


 


Urine WBC     (0-5)  /hpf


 


Urine WBC Clumps     (None)  /hpf


 


Amorphous Sediment     (None)  /hpf


 


Urine Bacteria     (None)  /hpf


 


Hyaline Casts     (0-2)  /lpf


 


Urine Mucus     (None)  /hpf


 


Serum Alcohol     mg/dL


 


Ethyl Alcohol Screen     (Negative)  mg/dL


 


Crossmatch     














  05/24/20 05/24/20 05/24/20 Range/Units





  06:45 06:47 07:48 


 


RBC     (3.80-5.40)  m/uL


 


Hgb     (11.4-16.0)  gm/dL


 


Hct     (34.0-46.0)  %


 


MCV     (80.0-100.0)  fL


 


MCHC     (31.0-37.0)  g/dL


 


RDW     (11.5-15.5)  %


 


Plt Count     (150-450)  k/uL


 


Lymphocytes # (Manual)     (1.0-4.8)  k/uL


 


Macrocytosis     


 


PT     (9.0-12.0)  sec


 


INR     (<1.2)  


 


APTT     (22.0-30.0)  sec


 


ABG pH    7.28 L  (7.35-7.45)  


 


ABG pCO2    22 L  (35-45)  mmHg


 


ABG pO2     ()  mmHg


 


ABG HCO3    10 L*  (21-25)  mmol/L


 


ABG Total CO2    11 L  (19-24)  mmol/L


 


ABG O2 Saturation    97.1 H  (94-97)  %


 


ABG Lactic Acid  17.5 H*    (0.5-1.6)  mmol/L


 


VBG pH     (7.31-7.41)  


 


VBG pCO2     (37-51)  mmHg


 


VBG HCO3     (24-28)  mmol/L


 


Potassium     (3.5-5.1)  mmol/L


 


Chloride     ()  mmol/L


 


Carbon Dioxide     (22-30)  mmol/L


 


Creatinine     (0.52-1.04)  mg/dL


 


Glucose     (74-99)  mg/dL


 


POC Glucose (mg/dL)   240 H   (75-99)  mg/dL


 


Osmolality     (280-301)  mosm/kg


 


Plasma Lactic Acid Scott     (0.7-2.0)  mmol/L


 


Calcium     (8.4-10.2)  mg/dL


 


Total Bilirubin     (0.2-1.3)  mg/dL


 


AST     (14-36)  U/L


 


ALT     (4-34)  U/L


 


Alkaline Phosphatase     ()  U/L


 


Ammonia     (<30)  umol/L


 


Creatine Kinase     ()  U/L


 


Total Protein     (6.3-8.2)  g/dL


 


Albumin     (3.5-5.0)  g/dL


 


Urine Appearance     (Clear)  


 


Urine Protein     (Negative)  


 


Urine Glucose (UA)     (Negative)  


 


Urine Blood     (Negative)  


 


Ur Leukocyte Esterase     (Negative)  


 


Urine RBC     (0-5)  /hpf


 


Urine WBC     (0-5)  /hpf


 


Urine WBC Clumps     (None)  /hpf


 


Amorphous Sediment     (None)  /hpf


 


Urine Bacteria     (None)  /hpf


 


Hyaline Casts     (0-2)  /lpf


 


Urine Mucus     (None)  /hpf


 


Serum Alcohol     mg/dL


 


Ethyl Alcohol Screen     (Negative)  mg/dL


 


Crossmatch     














  05/24/20 Range/Units





  11:09 


 


RBC   (3.80-5.40)  m/uL


 


Hgb   (11.4-16.0)  gm/dL


 


Hct   (34.0-46.0)  %


 


MCV   (80.0-100.0)  fL


 


MCHC   (31.0-37.0)  g/dL


 


RDW   (11.5-15.5)  %


 


Plt Count   (150-450)  k/uL


 


Lymphocytes # (Manual)   (1.0-4.8)  k/uL


 


Macrocytosis   


 


PT   (9.0-12.0)  sec


 


INR   (<1.2)  


 


APTT   (22.0-30.0)  sec


 


ABG pH   (7.35-7.45)  


 


ABG pCO2   (35-45)  mmHg


 


ABG pO2   ()  mmHg


 


ABG HCO3   (21-25)  mmol/L


 


ABG Total CO2   (19-24)  mmol/L


 


ABG O2 Saturation   (94-97)  %


 


ABG Lactic Acid   (0.5-1.6)  mmol/L


 


VBG pH   (7.31-7.41)  


 


VBG pCO2   (37-51)  mmHg


 


VBG HCO3   (24-28)  mmol/L


 


Potassium   (3.5-5.1)  mmol/L


 


Chloride   ()  mmol/L


 


Carbon Dioxide   (22-30)  mmol/L


 


Creatinine   (0.52-1.04)  mg/dL


 


Glucose   (74-99)  mg/dL


 


POC Glucose (mg/dL)  264 H  (75-99)  mg/dL


 


Osmolality   (280-301)  mosm/kg


 


Plasma Lactic Acid Scott   (0.7-2.0)  mmol/L


 


Calcium   (8.4-10.2)  mg/dL


 


Total Bilirubin   (0.2-1.3)  mg/dL


 


AST   (14-36)  U/L


 


ALT   (4-34)  U/L


 


Alkaline Phosphatase   ()  U/L


 


Ammonia   (<30)  umol/L


 


Creatine Kinase   ()  U/L


 


Total Protein   (6.3-8.2)  g/dL


 


Albumin   (3.5-5.0)  g/dL


 


Urine Appearance   (Clear)  


 


Urine Protein   (Negative)  


 


Urine Glucose (UA)   (Negative)  


 


Urine Blood   (Negative)  


 


Ur Leukocyte Esterase   (Negative)  


 


Urine RBC   (0-5)  /hpf


 


Urine WBC   (0-5)  /hpf


 


Urine WBC Clumps   (None)  /hpf


 


Amorphous Sediment   (None)  /hpf


 


Urine Bacteria   (None)  /hpf


 


Hyaline Casts   (0-2)  /lpf


 


Urine Mucus   (None)  /hpf


 


Serum Alcohol   mg/dL


 


Ethyl Alcohol Screen   (Negative)  mg/dL


 


Crossmatch   








                      Microbiology - Last 24 Hours (Table)











 05/23/20 14:22 Urine Culture - Preliminary





 Urine,Voided 


 


 05/23/20 20:14 Sputum Culture - Preliminary





 Sputum 














Assessment and Plan


Assessment: 


VDRF, Colitis, likely septic shock, UTI, cirrhosis, JACE  


Plan: 


patient continues to require high dose pressors, bicarb drip.  I do not believe 

she is is stable for surgery would survive surgery at this time.  Continue ICU 

care.if concern for ischemic colitis continues possibly could consider flexible 

sigmoidoscopy at bedside as a diagnostic procedure.  There has been no reports 

of bloody bowel movements yet which would be expected with ischemic colitis.

## 2020-05-24 NOTE — P.CONS
History of Present Illness





- Reason for Consult


Consult date: 05/24/20


sepsis


Requesting physician: Alisha Ortiz





- Chief Complaint


unresponsive x 1 day





- History of Present Illness


Patient is a 49-year-old -American female who was brought into the ER 

[facility] yesterday afternoon after coming to the hospital with 

unresponsive by a friend that lives with her chronically to the friend the 

patient woke around 8 this morning and took her trazodone she then went back in 

her room discharge her on 830 and for her unresponsive EMS arrived to find 

patient lying supine on the ground covered with a blanket patient is to have a l

ow blood sugar she was given an amp of dextrose slightly woke up yelling in the 

back of the ambulance and subsequently the patient was brought into the ER 

currently in the ER the patient had coded and had to be resuscitated patient has

been hypothermic on presentation to the ER as well as hypotensive requiring 

pressor support patient did have a normal white count on presentation and 

remains to normal she did have low hemoglobin of 6.3 status post blood 

transfusion is up to 9.0 patient did have a CT of abdominal pelvis which did 

show diffuse thickening of the and chronic bowel wall thickening with mesenteric

edema and small volume ascites suspicious for ischemic bowel resection with 

perihilar opacities patient had received multiple antibiotics and is currently 

on Zosyn infectious disease has been consulted for further recommendation 

monitor sepsis antibiotic mentation patient is currently sedated on the vent 

requiring pressor support in the form of lightheadedness depression and most in

formation has been obtained from review the chart.nursing staff as patient 

herself is reported history no family available bedside.








Review of Systems


Positive point has been mentioned in HPI complete review could not be obtained 

because patient is intubated on the vent sedated








Past Medical History


Past Medical History: Unable to Obtain


History of Any Multi-Drug Resistant Organisms: Unobtainable


Past Surgical History: Unable to Obtain


Past Psychological History: Unable to Obtain


Smoking Status: Unknown if ever smoked


Past Alcohol Use History: Unable to Obtain


Past Drug Use History: Unable to Obtain





Medications and Allergies


                                Home Medications











 Medication  Instructions  Recorded  Confirmed  Type


 


Unable To Assess [Unable to Assess]  05/23/20 05/23/20 History








                                    Allergies











Allergy/AdvReac Type Severity Reaction Status Date / Time


 


Unable to Assess Allergy   Verified 05/23/20 12:38














Physical Exam


Vitals: 


                                   Vital Signs











  Temp Pulse Pulse Pulse Resp BP BP


 


 05/24/20 09:00   117 H    31 H  112/54 


 


 05/24/20 08:45   114 H    24  112/53 


 


 05/24/20 08:30   114 H    25 H  112/54 


 


 05/24/20 08:15   118 H    30 H  108/51 


 


 05/24/20 08:00  99.7 F H  113 H    24  105/53 


 


 05/24/20 07:45   115 H    24  109/48 


 


 05/24/20 07:39    122 H    


 


 05/24/20 07:30   112 H    23  105/45 


 


 05/24/20 07:15   112 H    25 H  103/45 


 


 05/24/20 07:00   113 H    24  102/44 


 


 05/24/20 06:45   114 H    22  106/45 


 


 05/24/20 06:30   114 H    23  96/46 


 


 05/24/20 06:15   113 H    24  89/34 


 


 05/24/20 06:00   111 H    23  102/45 


 


 05/24/20 05:45   118 H    23  102/43 


 


 05/24/20 05:30   117 H    24  105/44 


 


 05/24/20 05:15   118 H    24  104/51 


 


 05/24/20 05:00   121 H    23  93/47 


 


 05/24/20 04:45   123 H    23  91/60 


 


 05/24/20 04:30   123 H    21  92/42 


 


 05/24/20 04:15   122 H    27 H  92/42 


 


 05/24/20 04:00  99.3 F  121 H  122 H   28 H  88/43 


 


 05/24/20 03:45   120 H    25 H  90/38 


 


 05/24/20 03:30   120 H    31 H  86/45 


 


 05/24/20 03:15   123 H    17  99/41 


 


 05/24/20 03:00   123 H    32 H  94/48 


 


 05/24/20 02:45   123 H    36 H  97/51 


 


 05/24/20 02:30   123 H    36 H  88/50 


 


 05/24/20 02:15   126 H    31 H  92/48 


 


 05/24/20 02:00   125 H    31 H  80/58 


 


 05/24/20 01:45   123 H    29 H  97/47 


 


 05/24/20 01:30   123 H    29 H  95/51 


 


 05/24/20 01:15   122 H    23  88/55 


 


 05/24/20 01:00  99.0 F  122 H    30 H  88/55 


 


 05/24/20 00:45   121 H    29 H  96/52 


 


 05/24/20 00:30      28 H  84/53 


 


 05/24/20 00:15   116 H    23  92/49 


 


 05/24/20 00:14   116 H    23  92/49 


 


 05/24/20 00:00  98.2 F  115 H  114 H   25 H  90/43 


 


 05/23/20 23:45   114 H    23  93/48 


 


 05/23/20 23:41  97.5 F L  114 H    31 H  93/50 


 


 05/23/20 23:30   113 H    22  96/47 


 


 05/23/20 23:15   113 H    22  93/50 


 


 05/23/20 23:11  97.3 F L  114 H    30 H  105/45 


 


 05/23/20 23:01  96.4 F L  112 H    29 H  93/50 


 


 05/23/20 23:00   112 H    22  94/46 


 


 05/23/20 22:45   112 H    44 H  98/56 


 


 05/23/20 22:30  96.4 F L  113 H    12  99/55 


 


 05/23/20 22:15   108 H    32 H  93/49 


 


 05/23/20 22:00   102 H    26 H  104/50 


 


 05/23/20 21:45   106 H    29 H  105/58 


 


 05/23/20 21:30   102 H    20  96/50 


 


 05/23/20 21:24  94.5 F L  103 H    29 H  96/50 


 


 05/23/20 21:15   104 H    26 H  103/53 


 


 05/23/20 21:07  94.5 F L  102 H    26 H  103/53 


 


 05/23/20 21:00  93.9 F L  103 H    24  107/54 


 


 05/23/20 20:57  34.4 F L  103 H    26 H  100/52 


 


 05/23/20 20:45  34.6 F L  103 H    24  104/49 


 


 05/23/20 20:30   102 H    24  92/50 


 


 05/23/20 20:27  94.4 F L  105 H    24  99/50 


 


 05/23/20 20:15  34.1 F L  103 H    22  92/49 


 


 05/23/20 20:05  92.8 F L  101 H    22  92/49 


 


 05/23/20 20:00  92.8 F L  111 H  102 H   22  85/48 


 


 05/23/20 19:57  92.8 F L  102 H    20  94/57 


 


 05/23/20 19:45   92    20  84/46 


 


 05/23/20 19:30   91    27 H  84/46 


 


 05/23/20 19:27  92.8 F L  103 H    20  85/48 


 


 05/23/20 19:17  92.8 F L  105 H    21  84/46 


 


 05/23/20 19:15   89    25 H  76/44 


 


 05/23/20 19:00   87    19  80/44 


 


 05/23/20 18:45   89    17  91/52 


 


 05/23/20 18:30   92    9 L  94/54 


 


 05/23/20 18:15   93    11 L  120/66 


 


 05/23/20 17:40  94.2 F L  102 H    14  128/67 


 


 05/23/20 17:30   101 H    23  136/69 


 


 05/23/20 17:20   100    11 L  121/71 


 


 05/23/20 17:10   105 H    22  135/72 


 


 05/23/20 17:00   104 H    18  128/72 


 


 05/23/20 16:50   105 H    16  132/70 


 


 05/23/20 16:40   105 H    14  136/75 


 


 05/23/20 16:30   108 H    19  150/80 


 


 05/23/20 16:20   109 H    18  144/80 


 


 05/23/20 16:10   108 H    18  140/77 


 


 05/23/20 16:00   106 H    18  126/78 


 


 05/23/20 15:50   103 H    20  128/76 


 


 05/23/20 15:40   101 H    14  116/73 


 


 05/23/20 15:39  90 F L  100    17  116/73 


 


 05/23/20 15:30   105 H    15  115/82 


 


 05/23/20 15:20   98    13  106/60 


 


 05/23/20 15:15  90 F L  101 H    17  111/65 


 


 05/23/20 15:10   96    16  102/46 


 


 05/23/20 15:05   98    16  107/43 


 


 05/23/20 15:01  90 F L  96    17  106/52 


 


 05/23/20 15:00   95    17  92/46 


 


 05/23/20 14:50   84    14  96/49 


 


 05/23/20 14:40   89    20  90/45 


 


 05/23/20 14:30       96/45 


 


 05/23/20 14:20   88    16  97/45 


 


 05/23/20 14:10   85    15  93/44 


 


 05/23/20 14:02   85    17  88/40 


 


 05/23/20 13:26  90.1 F L    92  14   93/44


 


 05/23/20 12:38   80    22  65/37 














  Pulse Ox


 


 05/24/20 09:00  98


 


 05/24/20 08:45  99


 


 05/24/20 08:30  99


 


 05/24/20 08:15  99


 


 05/24/20 08:00  98


 


 05/24/20 07:45  99


 


 05/24/20 07:39 


 


 05/24/20 07:30  98


 


 05/24/20 07:15  99


 


 05/24/20 07:00  99


 


 05/24/20 06:45  97


 


 05/24/20 06:30  99


 


 05/24/20 06:15  99


 


 05/24/20 06:00  100


 


 05/24/20 05:45  100


 


 05/24/20 05:30  98


 


 05/24/20 05:15  99


 


 05/24/20 05:00  99


 


 05/24/20 04:45 


 


 05/24/20 04:30 


 


 05/24/20 04:15  98


 


 05/24/20 04:00  98


 


 05/24/20 03:45  98


 


 05/24/20 03:30  98


 


 05/24/20 03:15  97


 


 05/24/20 03:00  97


 


 05/24/20 02:45  97


 


 05/24/20 02:30  97


 


 05/24/20 02:15  97


 


 05/24/20 02:00  97


 


 05/24/20 01:45  97


 


 05/24/20 01:30  97


 


 05/24/20 01:15  97


 


 05/24/20 01:00  98


 


 05/24/20 00:45  98


 


 05/24/20 00:30  98


 


 05/24/20 00:15  98


 


 05/24/20 00:14  98


 


 05/24/20 00:00  98


 


 05/23/20 23:45  98


 


 05/23/20 23:41  98


 


 05/23/20 23:30  98


 


 05/23/20 23:15  99


 


 05/23/20 23:11  98


 


 05/23/20 23:01  99


 


 05/23/20 23:00  99


 


 05/23/20 22:45  99


 


 05/23/20 22:30  99


 


 05/23/20 22:15  99


 


 05/23/20 22:00  99


 


 05/23/20 21:45  99


 


 05/23/20 21:30  98


 


 05/23/20 21:24  99


 


 05/23/20 21:15  99


 


 05/23/20 21:07  98


 


 05/23/20 21:00  99


 


 05/23/20 20:57  99


 


 05/23/20 20:45  98


 


 05/23/20 20:30  99


 


 05/23/20 20:27 


 


 05/23/20 20:15  99


 


 05/23/20 20:05  100


 


 05/23/20 20:00  99


 


 05/23/20 19:57  100


 


 05/23/20 19:45  99


 


 05/23/20 19:30  99


 


 05/23/20 19:27  100


 


 05/23/20 19:17  98


 


 05/23/20 19:15  99


 


 05/23/20 19:00  99


 


 05/23/20 18:45  99


 


 05/23/20 18:30  99


 


 05/23/20 18:15  99


 


 05/23/20 17:40 


 


 05/23/20 17:30  100


 


 05/23/20 17:20  100


 


 05/23/20 17:10  100


 


 05/23/20 17:00  99


 


 05/23/20 16:50  100


 


 05/23/20 16:40  100


 


 05/23/20 16:30  100


 


 05/23/20 16:20  100


 


 05/23/20 16:10  100


 


 05/23/20 16:00  100


 


 05/23/20 15:50  100


 


 05/23/20 15:40  100


 


 05/23/20 15:39 


 


 05/23/20 15:30  100


 


 05/23/20 15:20  100


 


 05/23/20 15:15  100


 


 05/23/20 15:10  100


 


 05/23/20 15:05  100


 


 05/23/20 15:01 


 


 05/23/20 15:00  100


 


 05/23/20 14:50  100


 


 05/23/20 14:40  100


 


 05/23/20 14:30 


 


 05/23/20 14:20  100


 


 05/23/20 14:10  100


 


 05/23/20 14:02  100


 


 05/23/20 13:26  100


 


 05/23/20 12:38  96








                                Intake and Output











 05/23/20 05/24/20 05/24/20





 22:59 06:59 14:59


 


Intake Total 8952.067 3665.620 510.986


 


Output Total 45 210 10


 


Balance 4303.888 5205.620 500.986


 


Intake:   


 


   1274 459


 


    Dextrose 5% in Water 1, 550 1200 450





    000 ml @ 150 mls/hr IV .   





    Q7H40M PATRICIA with Sodium   





    Bicarb (1 Meq/ml) 150 ml   





    Rx#:[id number]   


 


    Sodium bicarbonate  50 


 


    pressure bag  24 9


 


  Intake, IV Titration 33.327 479.620 51.986





  Amount   


 


    Midazolam HCl 50 mg In 6.25 12.333 50





    Sodium Chloride 0.9% 40   





    ml @ 1 MG/HR 1 mls/hr IV   





    .Q24H [facility] Rx#:[id number]   


 


    Norepinephrine 32 mg In 27.077 221.870 





    Sodium Chloride 0.9% 218   





    ml @ 0.05 MCG/KG/MIN 1.   





    914 mls/hr IV .Q24H ONE   





    Rx#:[id number]   


 


    Octreotide 500 mcg In  245.417 





    Sodium Chloride 0.9% 250   





    ml @ 50 MCG/HR 25 mls/hr   





    IV .Q10H [facility] Rx#:   





    [id number]   


 


    Propofol 1,000 mg In   1.986





    Empty Bag 1 bag @ Titrate   





    IV .Q0M [facility] Rx#:   





    [id number]   


 


  Blood Product 834 591 


 


    Ffp 24 Cpd  Unit  275 





    [id number]   


 


    Ffp 24 Cpd  Unit 0 316 





    [id number]   


 


    Platelet Irr Pheresis 2 214  





    Acda  Unit [id number]   


 


     As-1  Unit 310  





    [id number]   


 


     As-3  Unit 310  





    [id number]   


 


Output:   


 


  Gastric Drainage  200 


 


  Urine 45 10 10


 


Other:   


 


  Voiding Method Indwelling Catheter Indwelling Catheter Indwelling Catheter


 


  # Voids  0 0


 


  Weight  84.5 kg 








                         ABP, PAP, CO, CI - Last 8 Hours











Arterial Blood Pressure        137/51


 


Arterial Blood Pressure        134/48


 


Arterial Blood Pressure        133/46


 


Arterial Blood Pressure        132/45


 


Arterial Blood Pressure        130/45


 


Arterial Blood Pressure        126/43


 


Arterial Blood Pressure        126/42


 


Arterial Blood Pressure        122/42


 


Arterial Blood Pressure        120/39


 


Arterial Blood Pressure        20/20


 


Arterial Blood Pressure        127/44


 


Arterial Blood Pressure        118/45


 


Arterial Blood Pressure        82/33


 


Arterial Blood Pressure        127/45


 


Arterial Blood Pressure        121/43


 


Arterial Blood Pressure        121/43


 


Arterial Blood Pressure        120/44


 


Arterial Blood Pressure        143/45


 


Arterial Blood Pressure        123/49


 


Arterial Blood Pressure        122/46


 


Arterial Blood Pressure        121/45


 


Arterial Blood Pressure        117/44


 


Arterial Blood Pressure        113/45


 


Arterial Blood Pressure        105/46














GENERAL DESCRIPTION: Middle-aged female lying in bed, intubated on the vent no 

tachypnea or accessory muscle of respiration use.


HEENT: Shows Pallor , no scleral icterus. Oral mucous membrane is dry.


NECK: Trachea central, no thyromegaly.


LUNGS: Unlabored breathing.  Decreased breath sound at base. No wheeze or 

crackle.


HEART: S1, S2, regular rate and rhythm.


ABDOMEN: Soft, mild distention no tenderness , guarding or rigidity


EXTREMITIES: No edema of feet.


SKIN: No rash, no masses palpable.


NEUROLOGICAL: The patient is sedated on the vent











Results


CBC & Chem 7: 


                                 05/24/20 13:15





                                 05/24/20 19:05


Labs: 


                  Abnormal Lab Results - Last 24 Hours (Table)











  05/23/20 05/23/20 05/23/20 Range/Units





  08:18 08:18 12:14 


 


RBC     (3.80-5.40)  m/uL


 


Hgb     (11.4-16.0)  gm/dL


 


Hct     (34.0-46.0)  %


 


MCV     (80.0-100.0)  fL


 


MCHC     (31.0-37.0)  g/dL


 


RDW     (11.5-15.5)  %


 


Plt Count     (150-450)  k/uL


 


Lymphocytes # (Manual)     (1.0-4.8)  k/uL


 


Macrocytosis     


 


PT     (9.0-12.0)  sec


 


INR     (<1.2)  


 


APTT     (22.0-30.0)  sec


 


ABG pH     (7.35-7.45)  


 


ABG pCO2     (35-45)  mmHg


 


ABG pO2     ()  mmHg


 


ABG HCO3     (21-25)  mmol/L


 


ABG Total CO2     (19-24)  mmol/L


 


ABG O2 Saturation     (94-97)  %


 


ABG Lactic Acid     (0.5-1.6)  mmol/L


 


VBG pH     (7.31-7.41)  


 


VBG pCO2     (37-51)  mmHg


 


VBG HCO3     (24-28)  mmol/L


 


Potassium     (3.5-5.1)  mmol/L


 


Chloride     ()  mmol/L


 


Carbon Dioxide     (22-30)  mmol/L


 


Creatinine     (0.52-1.04)  mg/dL


 


Glucose     (74-99)  mg/dL


 


POC Glucose (mg/dL)    174 H  (75-99)  mg/dL


 


Osmolality   382 H*   (280-301)  mosm/kg


 


Plasma Lactic Acid Scott     (0.7-2.0)  mmol/L


 


Calcium     (8.4-10.2)  mg/dL


 


Total Bilirubin     (0.2-1.3)  mg/dL


 


AST     (14-36)  U/L


 


ALT     (4-34)  U/L


 


Alkaline Phosphatase     ()  U/L


 


Ammonia     (<30)  umol/L


 


Creatine Kinase     ()  U/L


 


Total Protein     (6.3-8.2)  g/dL


 


Albumin     (3.5-5.0)  g/dL


 


Urine Appearance     (Clear)  


 


Urine Protein     (Negative)  


 


Urine Glucose (UA)     (Negative)  


 


Urine Blood     (Negative)  


 


Ur Leukocyte Esterase     (Negative)  


 


Urine RBC     (0-5)  /hpf


 


Urine WBC     (0-5)  /hpf


 


Urine WBC Clumps     (None)  /hpf


 


Amorphous Sediment     (None)  /hpf


 


Urine Bacteria     (None)  /hpf


 


Hyaline Casts     (0-2)  /lpf


 


Urine Mucus     (None)  /hpf


 


Serum Alcohol     mg/dL


 


Ethyl Alcohol Screen  191 H    (Negative)  mg/dL


 


Crossmatch     














  05/23/20 05/23/20 05/23/20 Range/Units





  12:15 12:15 12:15 


 


RBC   2.05 L   (3.80-5.40)  m/uL


 


Hgb   6.3 L*   (11.4-16.0)  gm/dL


 


Hct   23.7 L   (34.0-46.0)  %


 


MCV   115.7 H   (80.0-100.0)  fL


 


MCHC   26.5 L   (31.0-37.0)  g/dL


 


RDW   19.9 H   (11.5-15.5)  %


 


Plt Count   48 L   (150-450)  k/uL


 


Lymphocytes # (Manual)     (1.0-4.8)  k/uL


 


Macrocytosis   Marked A   


 


PT    16.9 H  (9.0-12.0)  sec


 


INR    1.7 H  (<1.2)  


 


APTT    42.3 H  (22.0-30.0)  sec


 


ABG pH     (7.35-7.45)  


 


ABG pCO2     (35-45)  mmHg


 


ABG pO2     ()  mmHg


 


ABG HCO3     (21-25)  mmol/L


 


ABG Total CO2     (19-24)  mmol/L


 


ABG O2 Saturation     (94-97)  %


 


ABG Lactic Acid     (0.5-1.6)  mmol/L


 


VBG pH  6.84 L*    (7.31-7.41)  


 


VBG pCO2  25 L    (37-51)  mmHg


 


VBG HCO3  4 L*    (24-28)  mmol/L


 


Potassium     (3.5-5.1)  mmol/L


 


Chloride     ()  mmol/L


 


Carbon Dioxide     (22-30)  mmol/L


 


Creatinine     (0.52-1.04)  mg/dL


 


Glucose     (74-99)  mg/dL


 


POC Glucose (mg/dL)     (75-99)  mg/dL


 


Osmolality     (280-301)  mosm/kg


 


Plasma Lactic Acid Scott     (0.7-2.0)  mmol/L


 


Calcium     (8.4-10.2)  mg/dL


 


Total Bilirubin     (0.2-1.3)  mg/dL


 


AST     (14-36)  U/L


 


ALT     (4-34)  U/L


 


Alkaline Phosphatase     ()  U/L


 


Ammonia     (<30)  umol/L


 


Creatine Kinase     ()  U/L


 


Total Protein     (6.3-8.2)  g/dL


 


Albumin     (3.5-5.0)  g/dL


 


Urine Appearance     (Clear)  


 


Urine Protein     (Negative)  


 


Urine Glucose (UA)     (Negative)  


 


Urine Blood     (Negative)  


 


Ur Leukocyte Esterase     (Negative)  


 


Urine RBC     (0-5)  /hpf


 


Urine WBC     (0-5)  /hpf


 


Urine WBC Clumps     (None)  /hpf


 


Amorphous Sediment     (None)  /hpf


 


Urine Bacteria     (None)  /hpf


 


Hyaline Casts     (0-2)  /lpf


 


Urine Mucus     (None)  /hpf


 


Serum Alcohol     mg/dL


 


Ethyl Alcohol Screen     (Negative)  mg/dL


 


Crossmatch     














  05/23/20 05/23/20 05/23/20 Range/Units





  12:15 12:15 12:15 


 


RBC     (3.80-5.40)  m/uL


 


Hgb     (11.4-16.0)  gm/dL


 


Hct     (34.0-46.0)  %


 


MCV     (80.0-100.0)  fL


 


MCHC     (31.0-37.0)  g/dL


 


RDW     (11.5-15.5)  %


 


Plt Count     (150-450)  k/uL


 


Lymphocytes # (Manual)     (1.0-4.8)  k/uL


 


Macrocytosis     


 


PT     (9.0-12.0)  sec


 


INR     (<1.2)  


 


APTT     (22.0-30.0)  sec


 


ABG pH     (7.35-7.45)  


 


ABG pCO2     (35-45)  mmHg


 


ABG pO2     ()  mmHg


 


ABG HCO3     (21-25)  mmol/L


 


ABG Total CO2     (19-24)  mmol/L


 


ABG O2 Saturation     (94-97)  %


 


ABG Lactic Acid     (0.5-1.6)  mmol/L


 


VBG pH     (7.31-7.41)  


 


VBG pCO2     (37-51)  mmHg


 


VBG HCO3     (24-28)  mmol/L


 


Potassium  6.6 H*    (3.5-5.1)  mmol/L


 


Chloride  111 H    ()  mmol/L


 


Carbon Dioxide  <5 L*    (22-30)  mmol/L


 


Creatinine  3.55 H    (0.52-1.04)  mg/dL


 


Glucose  167 H    (74-99)  mg/dL


 


POC Glucose (mg/dL)     (75-99)  mg/dL


 


Osmolality     (280-301)  mosm/kg


 


Plasma Lactic Acid Scott   20.4 H*   (0.7-2.0)  mmol/L


 


Calcium  7.5 L    (8.4-10.2)  mg/dL


 


Total Bilirubin     (0.2-1.3)  mg/dL


 


AST  178 H    (14-36)  U/L


 


ALT  43 H    (4-34)  U/L


 


Alkaline Phosphatase  215 H    ()  U/L


 


Ammonia   199 H   (<30)  umol/L


 


Creatine Kinase  374 H    ()  U/L


 


Total Protein  5.3 L    (6.3-8.2)  g/dL


 


Albumin  2.5 L    (3.5-5.0)  g/dL


 


Urine Appearance     (Clear)  


 


Urine Protein     (Negative)  


 


Urine Glucose (UA)     (Negative)  


 


Urine Blood     (Negative)  


 


Ur Leukocyte Esterase     (Negative)  


 


Urine RBC     (0-5)  /hpf


 


Urine WBC     (0-5)  /hpf


 


Urine WBC Clumps     (None)  /hpf


 


Amorphous Sediment     (None)  /hpf


 


Urine Bacteria     (None)  /hpf


 


Hyaline Casts     (0-2)  /lpf


 


Urine Mucus     (None)  /hpf


 


Serum Alcohol  274 H*    mg/dL


 


Ethyl Alcohol Screen     (Negative)  mg/dL


 


Crossmatch    See Detail  














  05/23/20 05/23/20 05/23/20 Range/Units





  13:25 14:22 14:29 


 


RBC     (3.80-5.40)  m/uL


 


Hgb     (11.4-16.0)  gm/dL


 


Hct     (34.0-46.0)  %


 


MCV     (80.0-100.0)  fL


 


MCHC     (31.0-37.0)  g/dL


 


RDW     (11.5-15.5)  %


 


Plt Count     (150-450)  k/uL


 


Lymphocytes # (Manual)     (1.0-4.8)  k/uL


 


Macrocytosis     


 


PT     (9.0-12.0)  sec


 


INR     (<1.2)  


 


APTT     (22.0-30.0)  sec


 


ABG pH  6.81 L*    (7.35-7.45)  


 


ABG pCO2  22 L    (35-45)  mmHg


 


ABG pO2  >400 H    ()  mmHg


 


ABG HCO3  4 L*    (21-25)  mmol/L


 


ABG Total CO2  4 L    (19-24)  mmol/L


 


ABG O2 Saturation  99.3 H    (94-97)  %


 


ABG Lactic Acid     (0.5-1.6)  mmol/L


 


VBG pH     (7.31-7.41)  


 


VBG pCO2     (37-51)  mmHg


 


VBG HCO3     (24-28)  mmol/L


 


Potassium     (3.5-5.1)  mmol/L


 


Chloride     ()  mmol/L


 


Carbon Dioxide     (22-30)  mmol/L


 


Creatinine     (0.52-1.04)  mg/dL


 


Glucose     (74-99)  mg/dL


 


POC Glucose (mg/dL)    210 H  (75-99)  mg/dL


 


Osmolality     (280-301)  mosm/kg


 


Plasma Lactic Acid Scott     (0.7-2.0)  mmol/L


 


Calcium     (8.4-10.2)  mg/dL


 


Total Bilirubin     (0.2-1.3)  mg/dL


 


AST     (14-36)  U/L


 


ALT     (4-34)  U/L


 


Alkaline Phosphatase     ()  U/L


 


Ammonia     (<30)  umol/L


 


Creatine Kinase     ()  U/L


 


Total Protein     (6.3-8.2)  g/dL


 


Albumin     (3.5-5.0)  g/dL


 


Urine Appearance   Turbid H   (Clear)  


 


Urine Protein   2+ H   (Negative)  


 


Urine Glucose (UA)   Trace H   (Negative)  


 


Urine Blood   Moderate H   (Negative)  


 


Ur Leukocyte Esterase   Large H   (Negative)  


 


Urine RBC   82 H   (0-5)  /hpf


 


Urine WBC   >182 H   (0-5)  /hpf


 


Urine WBC Clumps   Many H   (None)  /hpf


 


Amorphous Sediment   Rare H   (None)  /hpf


 


Urine Bacteria   Many H   (None)  /hpf


 


Hyaline Casts   73 H   (0-2)  /lpf


 


Urine Mucus   Many H   (None)  /hpf


 


Serum Alcohol     mg/dL


 


Ethyl Alcohol Screen     (Negative)  mg/dL


 


Crossmatch     














  05/23/20 05/23/20 05/23/20 Range/Units





  16:26 17:57 20:00 


 


RBC     (3.80-5.40)  m/uL


 


Hgb     (11.4-16.0)  gm/dL


 


Hct     (34.0-46.0)  %


 


MCV     (80.0-100.0)  fL


 


MCHC     (31.0-37.0)  g/dL


 


RDW     (11.5-15.5)  %


 


Plt Count     (150-450)  k/uL


 


Lymphocytes # (Manual)     (1.0-4.8)  k/uL


 


Macrocytosis     


 


PT     (9.0-12.0)  sec


 


INR     (<1.2)  


 


APTT     (22.0-30.0)  sec


 


ABG pH  6.89 L*    (7.35-7.45)  


 


ABG pCO2  19 L*    (35-45)  mmHg


 


ABG pO2  165 H    ()  mmHg


 


ABG HCO3  4 L*    (21-25)  mmol/L


 


ABG Total CO2  4 L    (19-24)  mmol/L


 


ABG O2 Saturation  98.1 H    (94-97)  %


 


ABG Lactic Acid     (0.5-1.6)  mmol/L


 


VBG pH     (7.31-7.41)  


 


VBG pCO2     (37-51)  mmHg


 


VBG HCO3     (24-28)  mmol/L


 


Potassium     (3.5-5.1)  mmol/L


 


Chloride     ()  mmol/L


 


Carbon Dioxide     (22-30)  mmol/L


 


Creatinine     (0.52-1.04)  mg/dL


 


Glucose     (74-99)  mg/dL


 


POC Glucose (mg/dL)   135 H   (75-99)  mg/dL


 


Osmolality     (280-301)  mosm/kg


 


Plasma Lactic Acid Scott    18.1 H*  (0.7-2.0)  mmol/L


 


Calcium     (8.4-10.2)  mg/dL


 


Total Bilirubin     (0.2-1.3)  mg/dL


 


AST     (14-36)  U/L


 


ALT     (4-34)  U/L


 


Alkaline Phosphatase     ()  U/L


 


Ammonia     (<30)  umol/L


 


Creatine Kinase     ()  U/L


 


Total Protein     (6.3-8.2)  g/dL


 


Albumin     (3.5-5.0)  g/dL


 


Urine Appearance     (Clear)  


 


Urine Protein     (Negative)  


 


Urine Glucose (UA)     (Negative)  


 


Urine Blood     (Negative)  


 


Ur Leukocyte Esterase     (Negative)  


 


Urine RBC     (0-5)  /hpf


 


Urine WBC     (0-5)  /hpf


 


Urine WBC Clumps     (None)  /hpf


 


Amorphous Sediment     (None)  /hpf


 


Urine Bacteria     (None)  /hpf


 


Hyaline Casts     (0-2)  /lpf


 


Urine Mucus     (None)  /hpf


 


Serum Alcohol     mg/dL


 


Ethyl Alcohol Screen     (Negative)  mg/dL


 


Crossmatch     














  05/23/20 05/23/20 05/23/20 Range/Units





  20:46 22:25 22:25 


 


RBC   2.82 L   (3.80-5.40)  m/uL


 


Hgb   8.5 L D   (11.4-16.0)  gm/dL


 


Hct   28.8 L   (34.0-46.0)  %


 


MCV   102.2 H D   (80.0-100.0)  fL


 


MCHC   29.5 L   (31.0-37.0)  g/dL


 


RDW   19.9 H   (11.5-15.5)  %


 


Plt Count   81 L D   (150-450)  k/uL


 


Lymphocytes # (Manual)     (1.0-4.8)  k/uL


 


Macrocytosis     


 


PT     (9.0-12.0)  sec


 


INR     (<1.2)  


 


APTT     (22.0-30.0)  sec


 


ABG pH  7.12 L*    (7.35-7.45)  


 


ABG pCO2  22 L    (35-45)  mmHg


 


ABG pO2  122 H    ()  mmHg


 


ABG HCO3  7 L*    (21-25)  mmol/L


 


ABG Total CO2  8 L    (19-24)  mmol/L


 


ABG O2 Saturation  98.1 H    (94-97)  %


 


ABG Lactic Acid     (0.5-1.6)  mmol/L


 


VBG pH     (7.31-7.41)  


 


VBG pCO2     (37-51)  mmHg


 


VBG HCO3     (24-28)  mmol/L


 


Potassium    5.4 H  (3.5-5.1)  mmol/L


 


Chloride    109 H  ()  mmol/L


 


Carbon Dioxide    7 L*  (22-30)  mmol/L


 


Creatinine    3.03 H  (0.52-1.04)  mg/dL


 


Glucose    174 H  (74-99)  mg/dL


 


POC Glucose (mg/dL)     (75-99)  mg/dL


 


Osmolality     (280-301)  mosm/kg


 


Plasma Lactic Acid Scott     (0.7-2.0)  mmol/L


 


Calcium    6.6 L  (8.4-10.2)  mg/dL


 


Total Bilirubin    1.4 H  (0.2-1.3)  mg/dL


 


AST    289 H  (14-36)  U/L


 


ALT    53 H  (4-34)  U/L


 


Alkaline Phosphatase    241 H  ()  U/L


 


Ammonia     (<30)  umol/L


 


Creatine Kinase     ()  U/L


 


Total Protein    6.1 L  (6.3-8.2)  g/dL


 


Albumin    3.0 L  (3.5-5.0)  g/dL


 


Urine Appearance     (Clear)  


 


Urine Protein     (Negative)  


 


Urine Glucose (UA)     (Negative)  


 


Urine Blood     (Negative)  


 


Ur Leukocyte Esterase     (Negative)  


 


Urine RBC     (0-5)  /hpf


 


Urine WBC     (0-5)  /hpf


 


Urine WBC Clumps     (None)  /hpf


 


Amorphous Sediment     (None)  /hpf


 


Urine Bacteria     (None)  /hpf


 


Hyaline Casts     (0-2)  /lpf


 


Urine Mucus     (None)  /hpf


 


Serum Alcohol     mg/dL


 


Ethyl Alcohol Screen     (Negative)  mg/dL


 


Crossmatch     














  05/23/20 05/23/20 05/23/20 Range/Units





  22:25 22:26 22:50 


 


RBC     (3.80-5.40)  m/uL


 


Hgb     (11.4-16.0)  gm/dL


 


Hct     (34.0-46.0)  %


 


MCV     (80.0-100.0)  fL


 


MCHC     (31.0-37.0)  g/dL


 


RDW     (11.5-15.5)  %


 


Plt Count     (150-450)  k/uL


 


Lymphocytes # (Manual)     (1.0-4.8)  k/uL


 


Macrocytosis     


 


PT  14.5 H    (9.0-12.0)  sec


 


INR  1.5 H    (<1.2)  


 


APTT  30.6 H    (22.0-30.0)  sec


 


ABG pH    7.17 L*  (7.35-7.45)  


 


ABG pCO2    23 L  (35-45)  mmHg


 


ABG pO2    116 H  ()  mmHg


 


ABG HCO3    8 L*  (21-25)  mmol/L


 


ABG Total CO2    9 L  (19-24)  mmol/L


 


ABG O2 Saturation    97.9 H  (94-97)  %


 


ABG Lactic Acid     (0.5-1.6)  mmol/L


 


VBG pH     (7.31-7.41)  


 


VBG pCO2     (37-51)  mmHg


 


VBG HCO3     (24-28)  mmol/L


 


Potassium     (3.5-5.1)  mmol/L


 


Chloride     ()  mmol/L


 


Carbon Dioxide     (22-30)  mmol/L


 


Creatinine     (0.52-1.04)  mg/dL


 


Glucose     (74-99)  mg/dL


 


POC Glucose (mg/dL)   186 H   (75-99)  mg/dL


 


Osmolality     (280-301)  mosm/kg


 


Plasma Lactic Acid Scott     (0.7-2.0)  mmol/L


 


Calcium     (8.4-10.2)  mg/dL


 


Total Bilirubin     (0.2-1.3)  mg/dL


 


AST     (14-36)  U/L


 


ALT     (4-34)  U/L


 


Alkaline Phosphatase     ()  U/L


 


Ammonia     (<30)  umol/L


 


Creatine Kinase     ()  U/L


 


Total Protein     (6.3-8.2)  g/dL


 


Albumin     (3.5-5.0)  g/dL


 


Urine Appearance     (Clear)  


 


Urine Protein     (Negative)  


 


Urine Glucose (UA)     (Negative)  


 


Urine Blood     (Negative)  


 


Ur Leukocyte Esterase     (Negative)  


 


Urine RBC     (0-5)  /hpf


 


Urine WBC     (0-5)  /hpf


 


Urine WBC Clumps     (None)  /hpf


 


Amorphous Sediment     (None)  /hpf


 


Urine Bacteria     (None)  /hpf


 


Hyaline Casts     (0-2)  /lpf


 


Urine Mucus     (None)  /hpf


 


Serum Alcohol     mg/dL


 


Ethyl Alcohol Screen     (Negative)  mg/dL


 


Crossmatch     














  05/24/20 05/24/20 05/24/20 Range/Units





  02:32 02:35 02:35 


 


RBC   2.79 L   (3.80-5.40)  m/uL


 


Hgb   8.5 L   (11.4-16.0)  gm/dL


 


Hct   28.1 L   (34.0-46.0)  %


 


MCV   100.7 H   (80.0-100.0)  fL


 


MCHC   30.4 L   (31.0-37.0)  g/dL


 


RDW   20.1 H   (11.5-15.5)  %


 


Plt Count   97 L   (150-450)  k/uL


 


Lymphocytes # (Manual)     (1.0-4.8)  k/uL


 


Macrocytosis     


 


PT     (9.0-12.0)  sec


 


INR     (<1.2)  


 


APTT     (22.0-30.0)  sec


 


ABG pH     (7.35-7.45)  


 


ABG pCO2     (35-45)  mmHg


 


ABG pO2     ()  mmHg


 


ABG HCO3     (21-25)  mmol/L


 


ABG Total CO2     (19-24)  mmol/L


 


ABG O2 Saturation     (94-97)  %


 


ABG Lactic Acid     (0.5-1.6)  mmol/L


 


VBG pH     (7.31-7.41)  


 


VBG pCO2     (37-51)  mmHg


 


VBG HCO3     (24-28)  mmol/L


 


Potassium    5.7 H  (3.5-5.1)  mmol/L


 


Chloride     ()  mmol/L


 


Carbon Dioxide    9 L*  (22-30)  mmol/L


 


Creatinine     (0.52-1.04)  mg/dL


 


Glucose     (74-99)  mg/dL


 


POC Glucose (mg/dL)  229 H    (75-99)  mg/dL


 


Osmolality     (280-301)  mosm/kg


 


Plasma Lactic Acid Scott     (0.7-2.0)  mmol/L


 


Calcium     (8.4-10.2)  mg/dL


 


Total Bilirubin     (0.2-1.3)  mg/dL


 


AST     (14-36)  U/L


 


ALT     (4-34)  U/L


 


Alkaline Phosphatase     ()  U/L


 


Ammonia     (<30)  umol/L


 


Creatine Kinase     ()  U/L


 


Total Protein     (6.3-8.2)  g/dL


 


Albumin     (3.5-5.0)  g/dL


 


Urine Appearance     (Clear)  


 


Urine Protein     (Negative)  


 


Urine Glucose (UA)     (Negative)  


 


Urine Blood     (Negative)  


 


Ur Leukocyte Esterase     (Negative)  


 


Urine RBC     (0-5)  /hpf


 


Urine WBC     (0-5)  /hpf


 


Urine WBC Clumps     (None)  /hpf


 


Amorphous Sediment     (None)  /hpf


 


Urine Bacteria     (None)  /hpf


 


Hyaline Casts     (0-2)  /lpf


 


Urine Mucus     (None)  /hpf


 


Serum Alcohol     mg/dL


 


Ethyl Alcohol Screen     (Negative)  mg/dL


 


Crossmatch     














  05/24/20 05/24/20 05/24/20 Range/Units





  06:45 06:45 06:45 


 


RBC  2.76 L    (3.80-5.40)  m/uL


 


Hgb  8.5 L    (11.4-16.0)  gm/dL


 


Hct  27.4 L    (34.0-46.0)  %


 


MCV     (80.0-100.0)  fL


 


MCHC  30.9 L    (31.0-37.0)  g/dL


 


RDW  20.0 H    (11.5-15.5)  %


 


Plt Count  84 L    (150-450)  k/uL


 


Lymphocytes # (Manual)  0.98 L    (1.0-4.8)  k/uL


 


Macrocytosis     


 


PT     (9.0-12.0)  sec


 


INR     (<1.2)  


 


APTT     (22.0-30.0)  sec


 


ABG pH     (7.35-7.45)  


 


ABG pCO2     (35-45)  mmHg


 


ABG pO2     ()  mmHg


 


ABG HCO3     (21-25)  mmol/L


 


ABG Total CO2     (19-24)  mmol/L


 


ABG O2 Saturation     (94-97)  %


 


ABG Lactic Acid     (0.5-1.6)  mmol/L


 


VBG pH     (7.31-7.41)  


 


VBG pCO2     (37-51)  mmHg


 


VBG HCO3     (24-28)  mmol/L


 


Potassium   5.4 H   (3.5-5.1)  mmol/L


 


Chloride     ()  mmol/L


 


Carbon Dioxide   10 L   (22-30)  mmol/L


 


Creatinine   3.52 H   (0.52-1.04)  mg/dL


 


Glucose   231 H   (74-99)  mg/dL


 


POC Glucose (mg/dL)     (75-99)  mg/dL


 


Osmolality     (280-301)  mosm/kg


 


Plasma Lactic Acid Scott     (0.7-2.0)  mmol/L


 


Calcium   6.2 L*   (8.4-10.2)  mg/dL


 


Total Bilirubin   1.5 H   (0.2-1.3)  mg/dL


 


AST   355 H   (14-36)  U/L


 


ALT   50 H   (4-34)  U/L


 


Alkaline Phosphatase   221 H   ()  U/L


 


Ammonia    65 H  (<30)  umol/L


 


Creatine Kinase     ()  U/L


 


Total Protein   6.0 L   (6.3-8.2)  g/dL


 


Albumin   2.9 L   (3.5-5.0)  g/dL


 


Urine Appearance     (Clear)  


 


Urine Protein     (Negative)  


 


Urine Glucose (UA)     (Negative)  


 


Urine Blood     (Negative)  


 


Ur Leukocyte Esterase     (Negative)  


 


Urine RBC     (0-5)  /hpf


 


Urine WBC     (0-5)  /hpf


 


Urine WBC Clumps     (None)  /hpf


 


Amorphous Sediment     (None)  /hpf


 


Urine Bacteria     (None)  /hpf


 


Hyaline Casts     (0-2)  /lpf


 


Urine Mucus     (None)  /hpf


 


Serum Alcohol     mg/dL


 


Ethyl Alcohol Screen     (Negative)  mg/dL


 


Crossmatch     














  05/24/20 05/24/20 05/24/20 Range/Units





  06:45 06:47 07:48 


 


RBC     (3.80-5.40)  m/uL


 


Hgb     (11.4-16.0)  gm/dL


 


Hct     (34.0-46.0)  %


 


MCV     (80.0-100.0)  fL


 


MCHC     (31.0-37.0)  g/dL


 


RDW     (11.5-15.5)  %


 


Plt Count     (150-450)  k/uL


 


Lymphocytes # (Manual)     (1.0-4.8)  k/uL


 


Macrocytosis     


 


PT     (9.0-12.0)  sec


 


INR     (<1.2)  


 


APTT     (22.0-30.0)  sec


 


ABG pH    7.28 L  (7.35-7.45)  


 


ABG pCO2    22 L  (35-45)  mmHg


 


ABG pO2     ()  mmHg


 


ABG HCO3    10 L*  (21-25)  mmol/L


 


ABG Total CO2    11 L  (19-24)  mmol/L


 


ABG O2 Saturation    97.1 H  (94-97)  %


 


ABG Lactic Acid  17.5 H*    (0.5-1.6)  mmol/L


 


VBG pH     (7.31-7.41)  


 


VBG pCO2     (37-51)  mmHg


 


VBG HCO3     (24-28)  mmol/L


 


Potassium     (3.5-5.1)  mmol/L


 


Chloride     ()  mmol/L


 


Carbon Dioxide     (22-30)  mmol/L


 


Creatinine     (0.52-1.04)  mg/dL


 


Glucose     (74-99)  mg/dL


 


POC Glucose (mg/dL)   240 H   (75-99)  mg/dL


 


Osmolality     (280-301)  mosm/kg


 


Plasma Lactic Acid Scott     (0.7-2.0)  mmol/L


 


Calcium     (8.4-10.2)  mg/dL


 


Total Bilirubin     (0.2-1.3)  mg/dL


 


AST     (14-36)  U/L


 


ALT     (4-34)  U/L


 


Alkaline Phosphatase     ()  U/L


 


Ammonia     (<30)  umol/L


 


Creatine Kinase     ()  U/L


 


Total Protein     (6.3-8.2)  g/dL


 


Albumin     (3.5-5.0)  g/dL


 


Urine Appearance     (Clear)  


 


Urine Protein     (Negative)  


 


Urine Glucose (UA)     (Negative)  


 


Urine Blood     (Negative)  


 


Ur Leukocyte Esterase     (Negative)  


 


Urine RBC     (0-5)  /hpf


 


Urine WBC     (0-5)  /hpf


 


Urine WBC Clumps     (None)  /hpf


 


Amorphous Sediment     (None)  /hpf


 


Urine Bacteria     (None)  /hpf


 


Hyaline Casts     (0-2)  /lpf


 


Urine Mucus     (None)  /hpf


 


Serum Alcohol     mg/dL


 


Ethyl Alcohol Screen     (Negative)  mg/dL


 


Crossmatch     








                      Microbiology - Last 24 Hours (Table)











 05/23/20 14:22 Urine Culture - Preliminary





 Urine,Voided 


 


 05/23/20 20:14 Sputum Culture - Preliminary





 Sputum 














Assessment and Plan


Assessment: 


-patient presented to hospital with unresponsiveness this patient noticed to 

have low blood sugar and subsequently have a cardiac arrest in the ER in this 

patient who noted to be hypotensive hypothermic with diffuse thickening of the 

colon wall and concern for possible ischemic colitis there is no clear history 

of any recent antibiotic exposure and are not mention no diarrhea making 

infectious colitis to be less likely.








(1) Sepsis


Current Visit: Yes   Status: Acute   Code(s): A41.9 - SEPSIS, UNSPECIFIED 

ORGANISM   SNOMED Code(s): 72670796


   





(2) Ischemic colitis


Current Visit: Yes   Status: Acute   Code(s): K55.9 - VASCULAR DISORDER OF 

INTESTINE, UNSPECIFIED   SNOMED Code(s): 93849151


   


Plan: 


1-patient will be treated with Zosyn 3.375 g every 8 hour should cover for 

enteric anaerobes and anaerobes


2-if the patient has any loose stool stool studies will be collected to rule out

infectious etiology


3-IV fluids


We will follow on clinical condition and cultures to further adjust medication 

if needed


Thank you for this consultation we will follow the patient along with you





Time with Patient: Greater than 30

## 2020-05-24 NOTE — US
EXAMINATION TYPE: US kidneys/renal and bladder

 

DATE OF EXAM: 5/24/2020

 

COMPARISON: NONE

 

CLINICAL HISTORY: andriy. Very limited and difficult exam. ICU patient on a vent

 

EXAM MEASUREMENTS:

 

Right Kidney:  Not visualized  

Left Kidney: 12 x 5.6 x 4.2 cm

 

 

Right Kidney: Obscured by overlying bowel gas  

Left Kidney: No hydronephrosis or masses seen  

Bladder: Not visualized, patient has gomez

 

Trace perihepatic ascites partially visualized.

 

IMPRESSION: Partially visualized abdominal ascites. No hydronephrosis or nephrolithiasis of the left 
kidney. Nonvisualization of the right kidney.

## 2020-05-25 LAB
ALBUMIN SERPL-MCNC: 2.7 G/DL (ref 3.5–5)
ALP SERPL-CCNC: 216 U/L (ref 38–126)
ALT SERPL-CCNC: 75 U/L (ref 4–34)
ANION GAP SERPL CALC-SCNC: 12 MMOL/L
AST SERPL-CCNC: 541 U/L (ref 14–36)
BUN SERPL-SCNC: 16 MG/DL (ref 7–17)
CALCIUM SPEC-MCNC: 6.5 MG/DL (ref 8.4–10.2)
CELLS COUNTED: 200
CHLORIDE SERPL-SCNC: 101 MMOL/L (ref 98–107)
CO2 BLDA-SCNC: 31 MMOL/L (ref 19–24)
CO2 SERPL-SCNC: 29 MMOL/L (ref 22–30)
ERYTHROCYTE [DISTWIDTH] IN BLOOD BY AUTOMATED COUNT: 2.94 M/UL (ref 3.8–5.4)
ERYTHROCYTE [DISTWIDTH] IN BLOOD: 20.3 % (ref 11.5–15.5)
GLUCOSE BLD-MCNC: 112 MG/DL (ref 75–99)
GLUCOSE BLD-MCNC: 117 MG/DL (ref 75–99)
GLUCOSE BLD-MCNC: 120 MG/DL (ref 75–99)
GLUCOSE BLD-MCNC: 125 MG/DL (ref 75–99)
GLUCOSE BLD-MCNC: 125 MG/DL (ref 75–99)
GLUCOSE BLD-MCNC: 127 MG/DL (ref 75–99)
GLUCOSE BLD-MCNC: 130 MG/DL (ref 75–99)
GLUCOSE BLD-MCNC: 132 MG/DL (ref 75–99)
GLUCOSE BLD-MCNC: 134 MG/DL (ref 75–99)
GLUCOSE BLD-MCNC: 138 MG/DL (ref 75–99)
GLUCOSE BLD-MCNC: 140 MG/DL (ref 75–99)
GLUCOSE BLD-MCNC: 144 MG/DL (ref 75–99)
GLUCOSE BLD-MCNC: 146 MG/DL (ref 75–99)
GLUCOSE BLD-MCNC: 148 MG/DL (ref 75–99)
GLUCOSE BLD-MCNC: 152 MG/DL (ref 75–99)
GLUCOSE BLD-MCNC: 172 MG/DL (ref 75–99)
GLUCOSE BLD-MCNC: 177 MG/DL (ref 75–99)
GLUCOSE BLD-MCNC: 184 MG/DL (ref 75–99)
GLUCOSE BLD-MCNC: 187 MG/DL (ref 75–99)
GLUCOSE SERPL-MCNC: 127 MG/DL (ref 74–99)
HCO3 BLDA-SCNC: 30 MMOL/L (ref 21–25)
HCT VFR BLD AUTO: 27.8 % (ref 34–46)
HGB BLD-MCNC: 9.3 GM/DL (ref 11.4–16)
LACTATE BLDV-SCNC: 4.6 MMOL/L (ref 0.7–2)
LYMPHOCYTES # BLD MANUAL: 0.79 K/UL (ref 1–4.8)
MCH RBC QN AUTO: 31.8 PG (ref 25–35)
MCHC RBC AUTO-ENTMCNC: 33.6 G/DL (ref 31–37)
MCV RBC AUTO: 94.7 FL (ref 80–100)
METAMYELOCYTES # BLD: 0.18 K/UL
MONOCYTES # BLD MANUAL: 0.35 K/UL (ref 0–1)
NEUTROPHILS NFR BLD MANUAL: 59 %
NEUTS SEG # BLD MANUAL: 7.5 K/UL (ref 1.3–7.7)
PCO2 BLDA: 32 MMHG (ref 35–45)
PH BLDA: 7.58 [PH] (ref 7.35–7.45)
PLATELET # BLD AUTO: 47 K/UL (ref 150–450)
PO2 BLDA: 117 MMHG (ref 83–108)
POTASSIUM SERPL-SCNC: 3.9 MMOL/L (ref 3.5–5.1)
PROT SERPL-MCNC: 5.8 G/DL (ref 6.3–8.2)
SODIUM SERPL-SCNC: 142 MMOL/L (ref 137–145)
WBC # BLD AUTO: 8.8 K/UL (ref 3.8–10.6)

## 2020-05-25 PROCEDURE — 06HY33Z INSERTION OF INFUSION DEVICE INTO LOWER VEIN, PERCUTANEOUS APPROACH: ICD-10-PCS

## 2020-05-25 PROCEDURE — 5A1D70Z PERFORMANCE OF URINARY FILTRATION, INTERMITTENT, LESS THAN 6 HOURS PER DAY: ICD-10-PCS

## 2020-05-25 RX ADMIN — METRONIDAZOLE SCH MLS/HR: 500 INJECTION, SOLUTION INTRAVENOUS at 17:44

## 2020-05-25 RX ADMIN — HYDROCORTISONE SODIUM SUCCINATE SCH MG: 100 INJECTION, POWDER, FOR SOLUTION INTRAMUSCULAR; INTRAVENOUS at 17:44

## 2020-05-25 RX ADMIN — METRONIDAZOLE SCH MLS/HR: 500 INJECTION, SOLUTION INTRAVENOUS at 08:29

## 2020-05-25 RX ADMIN — PIPERACILLIN AND TAZOBACTAM SCH MLS/HR: 3; .375 INJECTION, POWDER, FOR SOLUTION INTRAVENOUS at 17:45

## 2020-05-25 RX ADMIN — RIFAXIMIN SCH MG: 550 TABLET ORAL at 20:57

## 2020-05-25 RX ADMIN — LACTULOSE SCH GM: 20 SOLUTION ORAL at 08:29

## 2020-05-25 RX ADMIN — PROPOFOL SCH MLS/HR: 10 INJECTION, EMULSION INTRAVENOUS at 12:00

## 2020-05-25 RX ADMIN — POTASSIUM CHLORIDE SCH MLS/HR: 14.9 INJECTION, SOLUTION INTRAVENOUS at 04:54

## 2020-05-25 RX ADMIN — RIFAXIMIN SCH MG: 550 TABLET ORAL at 08:34

## 2020-05-25 RX ADMIN — CHLORHEXIDINE GLUCONATE SCH ML: 1.2 RINSE ORAL at 08:30

## 2020-05-25 RX ADMIN — NOREPINEPHRINE BITARTRATE SCH MLS/HR: 1 INJECTION, SOLUTION, CONCENTRATE INTRAVENOUS at 13:46

## 2020-05-25 RX ADMIN — CEFAZOLIN SCH MLS/HR: 330 INJECTION, POWDER, FOR SOLUTION INTRAMUSCULAR; INTRAVENOUS at 13:48

## 2020-05-25 RX ADMIN — HYDROCORTISONE SODIUM SUCCINATE SCH MG: 100 INJECTION, POWDER, FOR SOLUTION INTRAMUSCULAR; INTRAVENOUS at 08:30

## 2020-05-25 RX ADMIN — LACTULOSE SCH GM: 20 SOLUTION ORAL at 20:56

## 2020-05-25 RX ADMIN — HYDROCORTISONE SODIUM SUCCINATE SCH MG: 100 INJECTION, POWDER, FOR SOLUTION INTRAMUSCULAR; INTRAVENOUS at 20:56

## 2020-05-25 RX ADMIN — CEFAZOLIN SCH: 330 INJECTION, POWDER, FOR SOLUTION INTRAMUSCULAR; INTRAVENOUS at 21:14

## 2020-05-25 RX ADMIN — PANTOPRAZOLE SODIUM SCH MG: 40 INJECTION, POWDER, FOR SOLUTION INTRAVENOUS at 08:30

## 2020-05-25 RX ADMIN — NOREPINEPHRINE BITARTRATE SCH: 1 INJECTION, SOLUTION, CONCENTRATE INTRAVENOUS at 08:16

## 2020-05-25 RX ADMIN — CHLORHEXIDINE GLUCONATE SCH ML: 1.2 RINSE ORAL at 20:56

## 2020-05-25 RX ADMIN — PIPERACILLIN AND TAZOBACTAM SCH MLS/HR: 3; .375 INJECTION, POWDER, FOR SOLUTION INTRAVENOUS at 04:44

## 2020-05-25 RX ADMIN — PROPOFOL SCH MLS/HR: 10 INJECTION, EMULSION INTRAVENOUS at 05:33

## 2020-05-25 RX ADMIN — METRONIDAZOLE SCH MLS/HR: 500 INJECTION, SOLUTION INTRAVENOUS at 20:56

## 2020-05-25 RX ADMIN — INSULIN HUMAN SCH MLS/HR: 100 INJECTION, SOLUTION PARENTERAL at 04:43

## 2020-05-25 NOTE — XR
EXAMINATION TYPE: XR chest 1V portable

 

DATE OF EXAM: 5/25/2020

 

COMPARISON: Prior chest x-ray 5/24/2020

 

HISTORY: Intubated

 

TECHNIQUE: Single frontal view of the chest is obtained.

 

FINDINGS:  Endotracheal tube and NG tube are overlying appropriate positions. There are overlying car
diac leads. No evident pneumothorax. Patchy density present bilaterally within the lungs. Heart size 
is stable. Loop recorder overlying the chest.

 

IMPRESSION:  There is no significant interval change, correlate for atelectasis versus pneumonia.

## 2020-05-25 NOTE — P.PN
Subjective





Patient is seen in follow for acute kidney injury.  Renal function is worse.  

Urine output about 40 mL overnight.  She is off vasopressin and Levophed is 

being weaned.  Currently intubated on 40% FiO2.





Vital signs are stable.  On Levophed.


General: The patient appeared well nourished and normally developed. 


HEENT: Head exam is unremarkable. Neck is without jugular venous distension.  

Intubated.


LUNGS: Lungs are clear to auscultation and percussion. Breath sounds decreased.


HEART: Rate and Rhythm are regular. 


ABDOMEN: Soft, nondistended.


EXTREMITITES: No edema.





Objective





- Vital Signs


Vital signs: 


                                   Vital Signs











Temp  97.7 F   05/25/20 08:00


 


Pulse  92   05/25/20 09:00


 


Resp  20   05/25/20 09:00


 


BP  113/69   05/25/20 09:00


 


Pulse Ox  100   05/25/20 09:00








                                 Intake & Output











 05/24/20 05/25/20 05/25/20





 18:59 06:59 18:59


 


Intake Total 2338.249 2194.199 294


 


Output Total 35 45 21


 


Balance 2303.249 2149.199 273


 


Weight  90.8 kg 


 


Intake:   


 


  IV 1989 1603 294


 


    0.9 KVO  220 60


 


    Dextrose 5% in Water 1, 1950 1350 225





    000 ml @ 75 mls/hr IV .   





    [id number] PATRICIA with Sodium   





    Bicarb (1 Meq/ml) 150 ml   





    Rx#:[id number]   


 


    pressure bag 39 33 9


 


  Intake, IV Titration 349.249 591.199 0





  Amount   


 


    Calcium Gluconate 1 gm In  100 





    Sodium Chloride 0.9% 100   





    ml @ 100 mls/hr IVPB   





    ONCE ONE Rx#:[id number]   


 


    Insulin Regular 100 unit 19.560 43.295 0





    In Sodium Chloride 0.9%   





    100 ml @ Per Protocol IV   





    .Q0M PATRICIA Rx#:[id number]   


 


    Midazolam HCl 50 mg In 50  





    Sodium Chloride 0.9% 40   





    ml @ 1 MG/HR 1 mls/hr IV   





    .Q24H PATRICIA Rx#:[id number]   


 


    Norepinephrine 32 mg In 93.874 58.215 





    Sodium Chloride 0.9% 218   





    ml @ 0.6 MCG/KG/MIN 23.   





    766 mls/hr IV .[id number]   





    PATRICIA Rx#:[id number]   


 


    Potassium Chloride 10 meq  100 





    In Water For Injection 1   





    100ml.bag @ 100 mls/hr   





    IVPB Q1H PATRICIA Rx#:   





    [id number]   


 


    Propofol 1,000 mg In 63.925 266.586 





    Empty Bag 1 bag @ Titrate   





    IV .Q0M [facility] Rx#:   





    [id number]   


 


    Sodium Chloride 0.9% 150 121.890 23.103 





    ml @ 0.04 UNITS/MIN 6.12   





    mls/hr IV .Q24H PATRICIA with   





    Vasopressin 60 unit Rx#:   





    [id number]   


 


Output:   


 


  Urine 35 45 21


 


Other:   


 


  Voiding Method Indwelling Catheter Indwelling Catheter Indwelling Catheter


 


  # Voids 0  


 


  # Bowel Movements  1 








                       ABP, PAP, CO, CI - Last Documented











Arterial Blood Pressure        117/43

















- Labs


CBC & Chem 7: 


                                 05/25/20 04:25





                                 05/25/20 04:25


Labs: 


                  Abnormal Lab Results - Last 24 Hours (Table)











  05/24/20 05/24/20 05/24/20 Range/Units





  11:09 13:15 14:06 


 


RBC   2.79 L   (3.80-5.40)  m/uL


 


Hgb   9.0 L   (11.4-16.0)  gm/dL


 


Hct   27.1 L   (34.0-46.0)  %


 


RDW   20.4 H   (11.5-15.5)  %


 


Plt Count   71 L   (150-450)  k/uL


 


Lymphocytes # (Manual)     (1.0-4.8)  k/uL


 


Metamyelocytes # (Man)     (0)  k/uL


 


Nucleated RBCs     (0-0)  /100 WBC


 


ABG pH     (7.35-7.45)  


 


ABG pCO2     (35-45)  mmHg


 


ABG pO2     ()  mmHg


 


ABG HCO3     (21-25)  mmol/L


 


ABG Total CO2     (19-24)  mmol/L


 


ABG O2 Saturation     (94-97)  %


 


Creatinine     (0.52-1.04)  mg/dL


 


Glucose     (74-99)  mg/dL


 


POC Glucose (mg/dL)  264 H   261 H  (75-99)  mg/dL


 


Plasma Lactic Acid Scott     (0.7-2.0)  mmol/L


 


Calcium     (8.4-10.2)  mg/dL


 


Total Bilirubin     (0.2-1.3)  mg/dL


 


AST     (14-36)  U/L


 


ALT     (4-34)  U/L


 


Alkaline Phosphatase     ()  U/L


 


Ammonia     (<30)  umol/L


 


Total Protein     (6.3-8.2)  g/dL


 


Albumin     (3.5-5.0)  g/dL














  05/24/20 05/24/20 05/24/20 Range/Units





  14:09 16:28 17:09 


 


RBC     (3.80-5.40)  m/uL


 


Hgb     (11.4-16.0)  gm/dL


 


Hct     (34.0-46.0)  %


 


RDW     (11.5-15.5)  %


 


Plt Count     (150-450)  k/uL


 


Lymphocytes # (Manual)     (1.0-4.8)  k/uL


 


Metamyelocytes # (Man)     (0)  k/uL


 


Nucleated RBCs     (0-0)  /100 WBC


 


ABG pH     (7.35-7.45)  


 


ABG pCO2     (35-45)  mmHg


 


ABG pO2     ()  mmHg


 


ABG HCO3     (21-25)  mmol/L


 


ABG Total CO2     (19-24)  mmol/L


 


ABG O2 Saturation     (94-97)  %


 


Creatinine     (0.52-1.04)  mg/dL


 


Glucose     (74-99)  mg/dL


 


POC Glucose (mg/dL)   224 H  200 H  (75-99)  mg/dL


 


Plasma Lactic Acid Scott  13.7 H*    (0.7-2.0)  mmol/L


 


Calcium     (8.4-10.2)  mg/dL


 


Total Bilirubin     (0.2-1.3)  mg/dL


 


AST     (14-36)  U/L


 


ALT     (4-34)  U/L


 


Alkaline Phosphatase     ()  U/L


 


Ammonia     (<30)  umol/L


 


Total Protein     (6.3-8.2)  g/dL


 


Albumin     (3.5-5.0)  g/dL














  05/24/20 05/24/20 05/24/20 Range/Units





  17:43 18:38 19:05 


 


RBC     (3.80-5.40)  m/uL


 


Hgb     (11.4-16.0)  gm/dL


 


Hct     (34.0-46.0)  %


 


RDW     (11.5-15.5)  %


 


Plt Count     (150-450)  k/uL


 


Lymphocytes # (Manual)     (1.0-4.8)  k/uL


 


Metamyelocytes # (Man)     (0)  k/uL


 


Nucleated RBCs     (0-0)  /100 WBC


 


ABG pH     (7.35-7.45)  


 


ABG pCO2  32 L    (35-45)  mmHg


 


ABG pO2     ()  mmHg


 


ABG HCO3     (21-25)  mmol/L


 


ABG Total CO2     (19-24)  mmol/L


 


ABG O2 Saturation  97.1 H    (94-97)  %


 


Creatinine    3.66 H  (0.52-1.04)  mg/dL


 


Glucose    163 H  (74-99)  mg/dL


 


POC Glucose (mg/dL)   183 H   (75-99)  mg/dL


 


Plasma Lactic Acid Scott     (0.7-2.0)  mmol/L


 


Calcium    6.2 L*  (8.4-10.2)  mg/dL


 


Total Bilirubin     (0.2-1.3)  mg/dL


 


AST     (14-36)  U/L


 


ALT     (4-34)  U/L


 


Alkaline Phosphatase     ()  U/L


 


Ammonia     (<30)  umol/L


 


Total Protein     (6.3-8.2)  g/dL


 


Albumin     (3.5-5.0)  g/dL














  05/24/20 05/24/20 05/24/20 Range/Units





  20:27 21:24 21:59 


 


RBC     (3.80-5.40)  m/uL


 


Hgb     (11.4-16.0)  gm/dL


 


Hct     (34.0-46.0)  %


 


RDW     (11.5-15.5)  %


 


Plt Count     (150-450)  k/uL


 


Lymphocytes # (Manual)     (1.0-4.8)  k/uL


 


Metamyelocytes # (Man)     (0)  k/uL


 


Nucleated RBCs     (0-0)  /100 WBC


 


ABG pH     (7.35-7.45)  


 


ABG pCO2     (35-45)  mmHg


 


ABG pO2     ()  mmHg


 


ABG HCO3     (21-25)  mmol/L


 


ABG Total CO2     (19-24)  mmol/L


 


ABG O2 Saturation     (94-97)  %


 


Creatinine     (0.52-1.04)  mg/dL


 


Glucose     (74-99)  mg/dL


 


POC Glucose (mg/dL)  151 H  168 H  170 H  (75-99)  mg/dL


 


Plasma Lactic Acid Scott     (0.7-2.0)  mmol/L


 


Calcium     (8.4-10.2)  mg/dL


 


Total Bilirubin     (0.2-1.3)  mg/dL


 


AST     (14-36)  U/L


 


ALT     (4-34)  U/L


 


Alkaline Phosphatase     ()  U/L


 


Ammonia     (<30)  umol/L


 


Total Protein     (6.3-8.2)  g/dL


 


Albumin     (3.5-5.0)  g/dL














  05/24/20 05/24/20 05/25/20 Range/Units





  23:33 23:35 00:35 


 


RBC     (3.80-5.40)  m/uL


 


Hgb     (11.4-16.0)  gm/dL


 


Hct     (34.0-46.0)  %


 


RDW     (11.5-15.5)  %


 


Plt Count     (150-450)  k/uL


 


Lymphocytes # (Manual)     (1.0-4.8)  k/uL


 


Metamyelocytes # (Man)     (0)  k/uL


 


Nucleated RBCs     (0-0)  /100 WBC


 


ABG pH     (7.35-7.45)  


 


ABG pCO2     (35-45)  mmHg


 


ABG pO2     ()  mmHg


 


ABG HCO3     (21-25)  mmol/L


 


ABG Total CO2     (19-24)  mmol/L


 


ABG O2 Saturation     (94-97)  %


 


Creatinine     (0.52-1.04)  mg/dL


 


Glucose     (74-99)  mg/dL


 


POC Glucose (mg/dL)  170 H   184 H  (75-99)  mg/dL


 


Plasma Lactic Acid Scott   6.2 H*   (0.7-2.0)  mmol/L


 


Calcium     (8.4-10.2)  mg/dL


 


Total Bilirubin     (0.2-1.3)  mg/dL


 


AST     (14-36)  U/L


 


ALT     (4-34)  U/L


 


Alkaline Phosphatase     ()  U/L


 


Ammonia     (<30)  umol/L


 


Total Protein     (6.3-8.2)  g/dL


 


Albumin     (3.5-5.0)  g/dL














  05/25/20 05/25/20 05/25/20 Range/Units





  02:11 03:34 04:22 


 


RBC     (3.80-5.40)  m/uL


 


Hgb     (11.4-16.0)  gm/dL


 


Hct     (34.0-46.0)  %


 


RDW     (11.5-15.5)  %


 


Plt Count     (150-450)  k/uL


 


Lymphocytes # (Manual)     (1.0-4.8)  k/uL


 


Metamyelocytes # (Man)     (0)  k/uL


 


Nucleated RBCs     (0-0)  /100 WBC


 


ABG pH     (7.35-7.45)  


 


ABG pCO2     (35-45)  mmHg


 


ABG pO2     ()  mmHg


 


ABG HCO3     (21-25)  mmol/L


 


ABG Total CO2     (19-24)  mmol/L


 


ABG O2 Saturation     (94-97)  %


 


Creatinine     (0.52-1.04)  mg/dL


 


Glucose     (74-99)  mg/dL


 


POC Glucose (mg/dL)  148 H  152 H  138 H  (75-99)  mg/dL


 


Plasma Lactic Acid Scott     (0.7-2.0)  mmol/L


 


Calcium     (8.4-10.2)  mg/dL


 


Total Bilirubin     (0.2-1.3)  mg/dL


 


AST     (14-36)  U/L


 


ALT     (4-34)  U/L


 


Alkaline Phosphatase     ()  U/L


 


Ammonia     (<30)  umol/L


 


Total Protein     (6.3-8.2)  g/dL


 


Albumin     (3.5-5.0)  g/dL














  05/25/20 05/25/20 05/25/20 Range/Units





  04:25 04:25 04:25 


 


RBC   2.94 L   (3.80-5.40)  m/uL


 


Hgb   9.3 L   (11.4-16.0)  gm/dL


 


Hct   27.8 L   (34.0-46.0)  %


 


RDW   20.3 H   (11.5-15.5)  %


 


Plt Count   47 L   (150-450)  k/uL


 


Lymphocytes # (Manual)   0.79 L   (1.0-4.8)  k/uL


 


Metamyelocytes # (Man)   0.18 H   (0)  k/uL


 


Nucleated RBCs   1 H   (0-0)  /100 WBC


 


ABG pH     (7.35-7.45)  


 


ABG pCO2     (35-45)  mmHg


 


ABG pO2     ()  mmHg


 


ABG HCO3     (21-25)  mmol/L


 


ABG Total CO2     (19-24)  mmol/L


 


ABG O2 Saturation     (94-97)  %


 


Creatinine    4.00 H  (0.52-1.04)  mg/dL


 


Glucose    127 H  (74-99)  mg/dL


 


POC Glucose (mg/dL)     (75-99)  mg/dL


 


Plasma Lactic Acid Scott  5.0 H*    (0.7-2.0)  mmol/L


 


Calcium    6.5 L  (8.4-10.2)  mg/dL


 


Total Bilirubin    2.2 H  (0.2-1.3)  mg/dL


 


AST    541 H  (14-36)  U/L


 


ALT    75 H  (4-34)  U/L


 


Alkaline Phosphatase    216 H  ()  U/L


 


Ammonia     (<30)  umol/L


 


Total Protein    5.8 L  (6.3-8.2)  g/dL


 


Albumin    2.7 L  (3.5-5.0)  g/dL














  05/25/20 05/25/20 05/25/20 Range/Units





  05:31 06:30 07:27 


 


RBC     (3.80-5.40)  m/uL


 


Hgb     (11.4-16.0)  gm/dL


 


Hct     (34.0-46.0)  %


 


RDW     (11.5-15.5)  %


 


Plt Count     (150-450)  k/uL


 


Lymphocytes # (Manual)     (1.0-4.8)  k/uL


 


Metamyelocytes # (Man)     (0)  k/uL


 


Nucleated RBCs     (0-0)  /100 WBC


 


ABG pH    7.58 H*  (7.35-7.45)  


 


ABG pCO2    32 L  (35-45)  mmHg


 


ABG pO2    117 H  ()  mmHg


 


ABG HCO3    30 H  (21-25)  mmol/L


 


ABG Total CO2    31 H  (19-24)  mmol/L


 


ABG O2 Saturation    99.1 H  (94-97)  %


 


Creatinine     (0.52-1.04)  mg/dL


 


Glucose     (74-99)  mg/dL


 


POC Glucose (mg/dL)  120 H  127 H   (75-99)  mg/dL


 


Plasma Lactic Acid Scott     (0.7-2.0)  mmol/L


 


Calcium     (8.4-10.2)  mg/dL


 


Total Bilirubin     (0.2-1.3)  mg/dL


 


AST     (14-36)  U/L


 


ALT     (4-34)  U/L


 


Alkaline Phosphatase     ()  U/L


 


Ammonia     (<30)  umol/L


 


Total Protein     (6.3-8.2)  g/dL


 


Albumin     (3.5-5.0)  g/dL














  05/25/20 05/25/20 05/25/20 Range/Units





  08:06 09:05 09:05 


 


RBC     (3.80-5.40)  m/uL


 


Hgb     (11.4-16.0)  gm/dL


 


Hct     (34.0-46.0)  %


 


RDW     (11.5-15.5)  %


 


Plt Count     (150-450)  k/uL


 


Lymphocytes # (Manual)     (1.0-4.8)  k/uL


 


Metamyelocytes # (Man)     (0)  k/uL


 


Nucleated RBCs     (0-0)  /100 WBC


 


ABG pH     (7.35-7.45)  


 


ABG pCO2     (35-45)  mmHg


 


ABG pO2     ()  mmHg


 


ABG HCO3     (21-25)  mmol/L


 


ABG Total CO2     (19-24)  mmol/L


 


ABG O2 Saturation     (94-97)  %


 


Creatinine     (0.52-1.04)  mg/dL


 


Glucose     (74-99)  mg/dL


 


POC Glucose (mg/dL)  172 H   187 H  (75-99)  mg/dL


 


Plasma Lactic Acid Scott   4.6 H*   (0.7-2.0)  mmol/L


 


Calcium     (8.4-10.2)  mg/dL


 


Total Bilirubin     (0.2-1.3)  mg/dL


 


AST     (14-36)  U/L


 


ALT     (4-34)  U/L


 


Alkaline Phosphatase     ()  U/L


 


Ammonia   44 H   (<30)  umol/L


 


Total Protein     (6.3-8.2)  g/dL


 


Albumin     (3.5-5.0)  g/dL








                      Microbiology - Last 24 Hours (Table)











 05/23/20 15:36 Blood Culture - Preliminary





 Blood    No Growth after 24 hours


 


 05/23/20 20:14 Gram Stain - Preliminary





 Sputum Sputum Culture - Preliminary


 


 05/23/20 14:22 Urine Culture - Preliminary





 Urine,Voided 














Assessment and Plan


Plan: 





Assessment:


1.  Acute kidney injury secondary to ATN secondary to septic shock.  Creatinine 

4 today.  CAT scan revealed fullness of the collecting systems.  No hydroneph

rosis noted on renal ultrasound.  Unknown baseline renal function.


2.  Hyperkalemia secondary to acute kidney injury, metabolic acidosis and GI 

bleed.  Improved with medical management.


3.  Severe metabolic acidosis secondary to acute kidney injury and lactic 

acidosis. Volatile screen negative.  Resolved.  Now alkalotic.


4.  Hypocalcemia secondary to acute kidney injury.


5.  Acute GI bleed status post blood transfusion and IV DDAVP.  GI following.  

Hemoglobin better.





Plan:


Discontinue bicarbonate drip.


Start normal saline at 75 mL an hour.


Lasix 80 mg IV once today.


Wean FiO2 and vasopressors.


Follow-up cultures.


Due to worsening renal function and oliguria, I will start renal replacement t

herapy.


Consults vascular surgery for dialysis catheter placement.


First treatment of hemodialysis today and second treatment tomorrow.

## 2020-05-25 NOTE — PCN
PROCEDURE NOTE



PREOPERATIVE DIAGNOSIS:

Acute on chronic renal failure with liver failure.



POSTOPERATIVE DIAGNOSIS:

Acute on chronic renal failure with liver failure.



PROCEDURE:

Ultrasound-guided micropuncture into the left femoral vein.



Micropuncture guidewire was passed and after that a dilator was advanced on the top of

the guidewire, then we passed the regular guidewire and then the dialysis catheter was

advanced on the top of the guidewire, flushed with heparin saline and hep-locked and

secured with 3-0 nylon. Dressing applied.  Patient tolerated the procedure well.





MMODL / IJN: [id number] / Job#: [id number]

## 2020-05-25 NOTE — PN
PROGRESS NOTE



DATE OF SERVICE:

05/25/2020.



CHIEF COMPLAINT:

Cardiorespiratory arrest.



HISTORY OF PRESENT ILLNESS:

This lady is a little bit more stable.  She is still sedated.  However, pressures are

being weaned.  She is also going to be dialyzed today.



PHYSICAL EXAM:

At the present time vital signs are normal and she does not have a temperature.  Chest

is clear and cardiac exam is normal and she is in sinus rhythm.  The abdomen is

slightly distended and does not seem particularly firm and there is no involuntary

response to palpation.



IMPRESSION:

1. Cardiorespiratory arrest, etiology unknown.

2. Hypotension.

3. Renal failure.



PLAN:

She is to be dialyzed today and she continues on ventilator support.





MMODL / IJN: [id number] / Job#: [id number]

## 2020-05-25 NOTE — P.PN
Subjective


Progress Note Date: 05/25/20


Principal diagnosis: 





Acute hypoxic respiratory failure, cardiac arrest, possible ischemic colitis





This is a 49-year-old -American female, no available past medical history

on this patient, patient was brought into the emergency room by EMS when the 

patient was noted by a boyfriend unresponsive.  Patient is known to have history

of alcohol abuse, she is also known to have history of depression, normally 

takes trazodone.  According to the boyfriend, around 8:30 AM, patient was found 

unresponsive.  Upon EMS arrival patient was noted to have low sugars, and she 

was given an amp of dextrose.  She did wake up a bit, however remained 

lethargic, and on her weight in the ambulance to the ER patient was yelling and 

getting restless and agitated.  Upon arrival to the ER, patient was not follow 

any instructions.  Patient was evaluated, initially she was able to protect her 

airways, she would open her eyes and look around to tactile stimuli.  After IV 

access, and after basic labs were ordered, patient was noted to 12-lead EKG 

showed mostly normal sinus rhythm rate of 81/m.  She had peak T waves in V2 

through V4 and the patient was sent for CT of the brain because of altered 

mental status.  Upon returning from CT to the trauma bay, patient became 

hypotensive, levo fed was started for hypotension and bradycardia then she went 

into asystole.  CPR was started, epinephrine was given, patient was intubated by

the ER physician, and she had one round of chest compressions.  In the meantime 

patient was intubated, and her endotracheal tube was noted to be initially in 

the right mainstem bronchus, and this was retracted a bit.  Orogastric tube was 

placed, and she was noted to have 75 mL of bright blood in the orogastric tube. 

Patient continued to require levo fed, and she was given fluid boluses for 

hypotension.  Venous blood gases showed very low pH of 6.8, pCO2 was 25, and 

bicarb was 4.  Sodium bicarb was given.  Patient was noted to have elevated 

potassium, hence 10 units of insulin were given along with 1 amp of dextrose.  

Patient received 2 L of fluid boluses.  And labs came back showing a hemoglobin 

of 6.3.  Platelets were also noted to be low at 48,000.  INR was 1.7.  

Creatinine 3.5.  And her liver enzymes were elevated.  Ammonia level was also 

noted to be 199.  Her serum alcohol was 274.  Patient was placed on a bicarb 

drip, she was placed empirically on antibiotics, and she was also placed on 

lactulose.  CT of the brain showed no evidence of intracranial hemorrhage or 

mass effect.  CT of the abdomen and pelvis was abnormal showing severe pa

ncolitis with mesenteric edema and small volume ascites.  The radiologist felt 

this could be consistent with ischemic bowel.  Patient was also noted to have 

severe degree of hepatic steatosis.  And bibasilar airspace disease/atelectasis.

 Patient remained hypotensive requiring more norepinephrine, and vasopressin was

added.  Surgical consultation was initiated, however considering the patient's 

clinical presentation and hypotension requiring significant amount of 

vasopressin and norepinephrine, and considering the patient was extremely 

acidotic, her lactic acid was as high as 20.  Patient felt to be not a surgical 

candidate.  And she needed to be more resuscitated before surgery could even be 

considered.  Patient was also placed on Sandostatin.  Rocephin was given in the 

ER, and I have recommended Flagyl and Zosyn.  I came in to see the patient in 

the ICU, a left brachial arterial line was placed, and recommended further labs 

to be ordered including ABG, coagulation profile, and CBC.  These are presently 

pending.  In the meantime patient received a total of 2 units of packed RBCs, 2 

units of fresh frozen plasma, and 1 unit of platelets.  Drug screen was 

basically negative except she had acetaminophen level of 11.5.  Salicylate 1.0, 

and serum alcohol level was 274.  





Patient was reevaluated today on 5/24/20.  Remains intubated and mechanically 

ventilated.  Her ventilator settings are assist control rate of 20, volume is 

400 FiO2 is 40% and PEEP of 5.  ABG showed a pO2 of 97 pCO2 of 22 pH of 7.28.  

Patient remains on sodium bicarb drip.  Remains on octreotide, Versed at 5 mg 

per hour, vasopressin at 0.04 units per hour, norepinephrine at 0.75 mcg/kg/m, 

is also on bicarb drip at 150 MLS per hour.  Patient has no urine output, her 

liver enzymes remain elevated.  Her abdomen remains distended.  She was seen by 

many consultants including surgery, patient remains a very poor candidate for 

any surgical intervention at this point as she seems to be hemodynamically 

unstable.  Ideally speaking would be worthwhile doing exploratory laparotomy, 

however the surgeon evaluated the patient, and we both felt that she is a high 

surgical risk at this point, and her hemodynamic status will not allow surgical 

intervention safely.  Her boyfriend is here today, and I discussed her status 

with the boyfriend, he told me that the patient is known to be alcoholic, he 

also told me that the night before she came into the hospital she was having 

lower back pain and abdominal pain.  She had this all night and she was moaning 

and groaning with pain.  The following day she was unresponsive and she had 

mental status changes, that is when EMS was notified.  Patient received so far 

since admission 2 units of packed RBCs, 2 units of fresh frozen plasma, and 

bunions of platelets.  She is also on Zosyn and Flagyl empirically for abdominal

sepsis and septic shock.  WBC count today is 6.1 hemoglobin is 8.5.  Patient 

continues to have significant bandemia on her differential.  Potassium is a bit 

high at 5.4 bicarb today is 10 BUN is 12 creatinine is 3.52.  Liver enzymes 

remain elevated.  And her blood sugar is 240.  Calcium is low, the patient will 

receive calcium gluconate.  Ammonia level is down to 65, patient remains on 

lactulose.  AST is 355 and ALT is 50 with alkaline phosphatase of 221.  Chest x-

ray showed improved aeration of upper lungs with stand like bibasilar atelectas

is.  Ultrasound of the abdomen and pelvis showed partially visualized abdominal 

ascites.  No hydronephrosis.  And a trace of perihepatic ascites noted.  Patient

is sedated on Versed





Patient was reevaluated today on 5/25/20, remains intubated, and mechanically 

ventilated.  Patient is covid negative.  She remains on assist control rate of 

20 to volume is 400 FiO2 is 40% and PEEP is 5.  ABG showed a pO2 of 117 pCO2 of 

32 pH of 7.58.  Hence I cut down her rate to 16.  Patient is now off vasopressin

, off octreotide, remains on a very small dose of norepinephrine at 0.02 

mcg/kg/m.  Remains on insulin at 2 units per hour.  IV fluid is 0.9 normal 

saline at 75 mL per hour.  Patient was seen by nephrology today, and planning 

hemodialysis.  She will have a dialysis catheter placed by vascular surgery, and

we'll proceed with hemodialysis this afternoon.  Chest x-ray showed mostly 

atelectasis at the bases and small effusions.  Antibiotics wise she remains on 

Zosyn and Flagyl.  Her metabolic acidosis corrected quite nicely, and today I 

plan to discontinue her bicarb drip and this was already discontinued early this

morning.  Again the patient is scheduled to undergo hemodialysis today.  She karla

l be placed on enteral feeding today also.  Based on the dramatic improvement 

and the dramatic change in her severe profound metabolic acidosis and lactic 

acidosis, I believe this is not a picture of ischemic colitis as suggested on 

the CT of the abdomen and pelvis upon presentation.  Patient continues to have 

abnormal liver enzymes, and continues to have abnormal renal profile.  WBC count

is 8.8 hemoglobin is 9.3.  Platelets remained low at 47,000 again.  Electrolytes

are normal BUN is 16 and creatinine is 4.0.  Patient continues to have very low 

urine output.  Liver profile was noted, continues to have elevated AST of 541 

ALT is 75 and alkaline phosphatase is 216 ammonia level is down to 44 from 199 

on admission.  Urine culture is showing gram-negative bacilli, patient is 

presently on Zosyn.











Objective





- Vital Signs


Vital signs: 


                                   Vital Signs











Temp  98 F   05/25/20 12:00


 


Pulse  92   05/25/20 14:30


 


Resp  17   05/25/20 14:30


 


BP  83/49   05/25/20 14:30


 


Pulse Ox  97   05/25/20 14:30








                                 Intake & Output











 05/24/20 05/25/20 05/25/20





 18:59 06:59 18:59


 


Intake Total 2338.249 2194.199 707.996


 


Output Total 35 45 46


 


Balance 2303.249 2149.199 661.996


 


Weight  90.8 kg 90.8 kg


 


Intake:   


 


  IV 1989 1603 684


 


    0.9 KVO  220 160


 


    Dextrose 5% in Water 1, 1950 1350 300





    000 ml @ 75 mls/hr IV .   





    [id number] PATRICIA with Sodium   





    Bicarb (1 Meq/ml) 150 ml   





    Rx#:[id number]   


 


    Sodium Chloride 0.9% 1,   200





    000 ml @ 50 mls/hr IV .   





    Q20H PATRICIA Rx#:[id number]   


 


    pressure bag 39 33 24


 


  Intake, IV Titration 349.249 591.199 23.996





  Amount   


 


    Calcium Gluconate 1 gm In  100 





    Sodium Chloride 0.9% 100   





    ml @ 100 mls/hr IVPB   





    ONCE ONE Rx#:[id number]   


 


    Insulin Regular 100 unit 19.560 43.295 10.235





    In Sodium Chloride 0.9%   





    100 ml @ Per Protocol IV   





    .Q0M PATRICIA Rx#:[id number]   


 


    Midazolam HCl 50 mg In 50  





    Sodium Chloride 0.9% 40   





    ml @ 1 MG/HR 1 mls/hr IV   





    .Q24H PATRICIA Rx#:[id number]   


 


    Norepinephrine 32 mg In 93.874 58.215 13.761





    Sodium Chloride 0.9% 218   





    ml @ 0.6 MCG/KG/MIN 23.   





    766 mls/hr IV .[id number]   





    PATRICIA Rx#:[id number]   


 


    Potassium Chloride 10 meq  100 





    In Water For Injection 1   





    100ml.bag @ 100 mls/hr   





    IVPB Q1H PATRICIA Rx#:   





    [id number]   


 


    Propofol 1,000 mg In 63.925 266.586 





    Empty Bag 1 bag @ Titrate   





    IV .Q0M PATRICIA Rx#:   





    [id number]   


 


    Sodium Chloride 0.9% 150 121.890 23.103 





    ml @ 0.04 UNITS/MIN 6.12   





    mls/hr IV .Q24H PATRICIA with   





    Vasopressin 60 unit Rx#:   





    [id number]   


 


Output:   


 


  Urine 35 45 46


 


Other:   


 


  Voiding Method Indwelling Catheter Indwelling Catheter Indwelling Catheter


 


  # Voids 0  


 


  # Bowel Movements  1 








                       ABP, PAP, CO, CI - Last Documented











Arterial Blood Pressure        88/45

















- Exam





Physical Exam: Revealed a 49-year-old -American female, intubated, 

sedated.  On propofol.


Head: Atraumatic, normocephalic, endotracheal tube and orogastric tube are 

intact.


HEENT:[Neck is supple.] [No neck masses.] [No thyromegaly.] [No JVD.]  Moist m

ucous membranes.  PERRLA, EOMI, no icterus.


Chest: [Symmetrical chest expansion, crackles at the bases, no rhonchi and no 

wheezes..]


Cardiac Exam: [Normal S1 and S2, no S3 gallop, no murmur.]


Abdomen: [Soft, distended, nontender,  no megaly, no rebound, no guarding, 

positive bowel sounds, and patient is stooling.  No evidence of blood in the 

stools.


Extremities: [No clubbing, no edema, no cyanosis.]


Neurological Exam: Patient is intubated, sedated, unable to assess.


Psychiatric: Could not be assessed.


Skin: No rashes.











- Labs


CBC & Chem 7: 


                                 05/25/20 04:25





                                 05/25/20 04:25


Labs: 


                  Abnormal Lab Results - Last 24 Hours (Table)











  05/24/20 05/24/20 05/24/20 Range/Units





  16:28 17:09 17:43 


 


RBC     (3.80-5.40)  m/uL


 


Hgb     (11.4-16.0)  gm/dL


 


Hct     (34.0-46.0)  %


 


RDW     (11.5-15.5)  %


 


Plt Count     (150-450)  k/uL


 


Lymphocytes # (Manual)     (1.0-4.8)  k/uL


 


Metamyelocytes # (Man)     (0)  k/uL


 


Nucleated RBCs     (0-0)  /100 WBC


 


ABG pH     (7.35-7.45)  


 


ABG pCO2    32 L  (35-45)  mmHg


 


ABG pO2     ()  mmHg


 


ABG HCO3     (21-25)  mmol/L


 


ABG Total CO2     (19-24)  mmol/L


 


ABG O2 Saturation    97.1 H  (94-97)  %


 


Creatinine     (0.52-1.04)  mg/dL


 


Glucose     (74-99)  mg/dL


 


POC Glucose (mg/dL)  224 H  200 H   (75-99)  mg/dL


 


Plasma Lactic Acid Scott     (0.7-2.0)  mmol/L


 


Calcium     (8.4-10.2)  mg/dL


 


Total Bilirubin     (0.2-1.3)  mg/dL


 


AST     (14-36)  U/L


 


ALT     (4-34)  U/L


 


Alkaline Phosphatase     ()  U/L


 


Ammonia     (<30)  umol/L


 


Total Protein     (6.3-8.2)  g/dL


 


Albumin     (3.5-5.0)  g/dL














  05/24/20 05/24/20 05/24/20 Range/Units





  18:38 19:05 20:27 


 


RBC     (3.80-5.40)  m/uL


 


Hgb     (11.4-16.0)  gm/dL


 


Hct     (34.0-46.0)  %


 


RDW     (11.5-15.5)  %


 


Plt Count     (150-450)  k/uL


 


Lymphocytes # (Manual)     (1.0-4.8)  k/uL


 


Metamyelocytes # (Man)     (0)  k/uL


 


Nucleated RBCs     (0-0)  /100 WBC


 


ABG pH     (7.35-7.45)  


 


ABG pCO2     (35-45)  mmHg


 


ABG pO2     ()  mmHg


 


ABG HCO3     (21-25)  mmol/L


 


ABG Total CO2     (19-24)  mmol/L


 


ABG O2 Saturation     (94-97)  %


 


Creatinine   3.66 H   (0.52-1.04)  mg/dL


 


Glucose   163 H   (74-99)  mg/dL


 


POC Glucose (mg/dL)  183 H   151 H  (75-99)  mg/dL


 


Plasma Lactic Acid Scott     (0.7-2.0)  mmol/L


 


Calcium   6.2 L*   (8.4-10.2)  mg/dL


 


Total Bilirubin     (0.2-1.3)  mg/dL


 


AST     (14-36)  U/L


 


ALT     (4-34)  U/L


 


Alkaline Phosphatase     ()  U/L


 


Ammonia     (<30)  umol/L


 


Total Protein     (6.3-8.2)  g/dL


 


Albumin     (3.5-5.0)  g/dL














  05/24/20 05/24/20 05/24/20 Range/Units





  21:24 21:59 23:33 


 


RBC     (3.80-5.40)  m/uL


 


Hgb     (11.4-16.0)  gm/dL


 


Hct     (34.0-46.0)  %


 


RDW     (11.5-15.5)  %


 


Plt Count     (150-450)  k/uL


 


Lymphocytes # (Manual)     (1.0-4.8)  k/uL


 


Metamyelocytes # (Man)     (0)  k/uL


 


Nucleated RBCs     (0-0)  /100 WBC


 


ABG pH     (7.35-7.45)  


 


ABG pCO2     (35-45)  mmHg


 


ABG pO2     ()  mmHg


 


ABG HCO3     (21-25)  mmol/L


 


ABG Total CO2     (19-24)  mmol/L


 


ABG O2 Saturation     (94-97)  %


 


Creatinine     (0.52-1.04)  mg/dL


 


Glucose     (74-99)  mg/dL


 


POC Glucose (mg/dL)  168 H  170 H  170 H  (75-99)  mg/dL


 


Plasma Lactic Acid Scott     (0.7-2.0)  mmol/L


 


Calcium     (8.4-10.2)  mg/dL


 


Total Bilirubin     (0.2-1.3)  mg/dL


 


AST     (14-36)  U/L


 


ALT     (4-34)  U/L


 


Alkaline Phosphatase     ()  U/L


 


Ammonia     (<30)  umol/L


 


Total Protein     (6.3-8.2)  g/dL


 


Albumin     (3.5-5.0)  g/dL














  05/24/20 05/25/20 05/25/20 Range/Units





  23:35 00:35 02:11 


 


RBC     (3.80-5.40)  m/uL


 


Hgb     (11.4-16.0)  gm/dL


 


Hct     (34.0-46.0)  %


 


RDW     (11.5-15.5)  %


 


Plt Count     (150-450)  k/uL


 


Lymphocytes # (Manual)     (1.0-4.8)  k/uL


 


Metamyelocytes # (Man)     (0)  k/uL


 


Nucleated RBCs     (0-0)  /100 WBC


 


ABG pH     (7.35-7.45)  


 


ABG pCO2     (35-45)  mmHg


 


ABG pO2     ()  mmHg


 


ABG HCO3     (21-25)  mmol/L


 


ABG Total CO2     (19-24)  mmol/L


 


ABG O2 Saturation     (94-97)  %


 


Creatinine     (0.52-1.04)  mg/dL


 


Glucose     (74-99)  mg/dL


 


POC Glucose (mg/dL)   184 H  148 H  (75-99)  mg/dL


 


Plasma Lactic Acid Scott  6.2 H*    (0.7-2.0)  mmol/L


 


Calcium     (8.4-10.2)  mg/dL


 


Total Bilirubin     (0.2-1.3)  mg/dL


 


AST     (14-36)  U/L


 


ALT     (4-34)  U/L


 


Alkaline Phosphatase     ()  U/L


 


Ammonia     (<30)  umol/L


 


Total Protein     (6.3-8.2)  g/dL


 


Albumin     (3.5-5.0)  g/dL














  05/25/20 05/25/20 05/25/20 Range/Units





  03:34 04:22 04:25 


 


RBC     (3.80-5.40)  m/uL


 


Hgb     (11.4-16.0)  gm/dL


 


Hct     (34.0-46.0)  %


 


RDW     (11.5-15.5)  %


 


Plt Count     (150-450)  k/uL


 


Lymphocytes # (Manual)     (1.0-4.8)  k/uL


 


Metamyelocytes # (Man)     (0)  k/uL


 


Nucleated RBCs     (0-0)  /100 WBC


 


ABG pH     (7.35-7.45)  


 


ABG pCO2     (35-45)  mmHg


 


ABG pO2     ()  mmHg


 


ABG HCO3     (21-25)  mmol/L


 


ABG Total CO2     (19-24)  mmol/L


 


ABG O2 Saturation     (94-97)  %


 


Creatinine     (0.52-1.04)  mg/dL


 


Glucose     (74-99)  mg/dL


 


POC Glucose (mg/dL)  152 H  138 H   (75-99)  mg/dL


 


Plasma Lactic Acid Scott    5.0 H*  (0.7-2.0)  mmol/L


 


Calcium     (8.4-10.2)  mg/dL


 


Total Bilirubin     (0.2-1.3)  mg/dL


 


AST     (14-36)  U/L


 


ALT     (4-34)  U/L


 


Alkaline Phosphatase     ()  U/L


 


Ammonia     (<30)  umol/L


 


Total Protein     (6.3-8.2)  g/dL


 


Albumin     (3.5-5.0)  g/dL














  05/25/20 05/25/20 05/25/20 Range/Units





  04:25 04:25 05:31 


 


RBC  2.94 L    (3.80-5.40)  m/uL


 


Hgb  9.3 L    (11.4-16.0)  gm/dL


 


Hct  27.8 L    (34.0-46.0)  %


 


RDW  20.3 H    (11.5-15.5)  %


 


Plt Count  47 L    (150-450)  k/uL


 


Lymphocytes # (Manual)  0.79 L    (1.0-4.8)  k/uL


 


Metamyelocytes # (Man)  0.18 H    (0)  k/uL


 


Nucleated RBCs  1 H    (0-0)  /100 WBC


 


ABG pH     (7.35-7.45)  


 


ABG pCO2     (35-45)  mmHg


 


ABG pO2     ()  mmHg


 


ABG HCO3     (21-25)  mmol/L


 


ABG Total CO2     (19-24)  mmol/L


 


ABG O2 Saturation     (94-97)  %


 


Creatinine   4.00 H   (0.52-1.04)  mg/dL


 


Glucose   127 H   (74-99)  mg/dL


 


POC Glucose (mg/dL)    120 H  (75-99)  mg/dL


 


Plasma Lactic Acid Scott     (0.7-2.0)  mmol/L


 


Calcium   6.5 L   (8.4-10.2)  mg/dL


 


Total Bilirubin   2.2 H   (0.2-1.3)  mg/dL


 


AST   541 H   (14-36)  U/L


 


ALT   75 H   (4-34)  U/L


 


Alkaline Phosphatase   216 H   ()  U/L


 


Ammonia     (<30)  umol/L


 


Total Protein   5.8 L   (6.3-8.2)  g/dL


 


Albumin   2.7 L   (3.5-5.0)  g/dL














  05/25/20 05/25/20 05/25/20 Range/Units





  06:30 07:27 08:06 


 


RBC     (3.80-5.40)  m/uL


 


Hgb     (11.4-16.0)  gm/dL


 


Hct     (34.0-46.0)  %


 


RDW     (11.5-15.5)  %


 


Plt Count     (150-450)  k/uL


 


Lymphocytes # (Manual)     (1.0-4.8)  k/uL


 


Metamyelocytes # (Man)     (0)  k/uL


 


Nucleated RBCs     (0-0)  /100 WBC


 


ABG pH   7.58 H*   (7.35-7.45)  


 


ABG pCO2   32 L   (35-45)  mmHg


 


ABG pO2   117 H   ()  mmHg


 


ABG HCO3   30 H   (21-25)  mmol/L


 


ABG Total CO2   31 H   (19-24)  mmol/L


 


ABG O2 Saturation   99.1 H   (94-97)  %


 


Creatinine     (0.52-1.04)  mg/dL


 


Glucose     (74-99)  mg/dL


 


POC Glucose (mg/dL)  127 H   172 H  (75-99)  mg/dL


 


Plasma Lactic Acid Scott     (0.7-2.0)  mmol/L


 


Calcium     (8.4-10.2)  mg/dL


 


Total Bilirubin     (0.2-1.3)  mg/dL


 


AST     (14-36)  U/L


 


ALT     (4-34)  U/L


 


Alkaline Phosphatase     ()  U/L


 


Ammonia     (<30)  umol/L


 


Total Protein     (6.3-8.2)  g/dL


 


Albumin     (3.5-5.0)  g/dL














  05/25/20 05/25/20 05/25/20 Range/Units





  09:05 09:05 10:46 


 


RBC     (3.80-5.40)  m/uL


 


Hgb     (11.4-16.0)  gm/dL


 


Hct     (34.0-46.0)  %


 


RDW     (11.5-15.5)  %


 


Plt Count     (150-450)  k/uL


 


Lymphocytes # (Manual)     (1.0-4.8)  k/uL


 


Metamyelocytes # (Man)     (0)  k/uL


 


Nucleated RBCs     (0-0)  /100 WBC


 


ABG pH     (7.35-7.45)  


 


ABG pCO2     (35-45)  mmHg


 


ABG pO2     ()  mmHg


 


ABG HCO3     (21-25)  mmol/L


 


ABG Total CO2     (19-24)  mmol/L


 


ABG O2 Saturation     (94-97)  %


 


Creatinine     (0.52-1.04)  mg/dL


 


Glucose     (74-99)  mg/dL


 


POC Glucose (mg/dL)   187 H  177 H  (75-99)  mg/dL


 


Plasma Lactic Acid Scott  4.6 H*    (0.7-2.0)  mmol/L


 


Calcium     (8.4-10.2)  mg/dL


 


Total Bilirubin     (0.2-1.3)  mg/dL


 


AST     (14-36)  U/L


 


ALT     (4-34)  U/L


 


Alkaline Phosphatase     ()  U/L


 


Ammonia  44 H    (<30)  umol/L


 


Total Protein     (6.3-8.2)  g/dL


 


Albumin     (3.5-5.0)  g/dL














  05/25/20 05/25/20 05/25/20 Range/Units





  12:09 13:19 13:25 


 


RBC     (3.80-5.40)  m/uL


 


Hgb     (11.4-16.0)  gm/dL


 


Hct     (34.0-46.0)  %


 


RDW     (11.5-15.5)  %


 


Plt Count     (150-450)  k/uL


 


Lymphocytes # (Manual)     (1.0-4.8)  k/uL


 


Metamyelocytes # (Man)     (0)  k/uL


 


Nucleated RBCs     (0-0)  /100 WBC


 


ABG pH     (7.35-7.45)  


 


ABG pCO2     (35-45)  mmHg


 


ABG pO2     ()  mmHg


 


ABG HCO3     (21-25)  mmol/L


 


ABG Total CO2     (19-24)  mmol/L


 


ABG O2 Saturation     (94-97)  %


 


Creatinine     (0.52-1.04)  mg/dL


 


Glucose     (74-99)  mg/dL


 


POC Glucose (mg/dL)  146 H  140 H   (75-99)  mg/dL


 


Plasma Lactic Acid Scott    4.5 H*  (0.7-2.0)  mmol/L


 


Calcium     (8.4-10.2)  mg/dL


 


Total Bilirubin     (0.2-1.3)  mg/dL


 


AST     (14-36)  U/L


 


ALT     (4-34)  U/L


 


Alkaline Phosphatase     ()  U/L


 


Ammonia     (<30)  umol/L


 


Total Protein     (6.3-8.2)  g/dL


 


Albumin     (3.5-5.0)  g/dL








                      Microbiology - Last 24 Hours (Table)











 05/23/20 14:22 Urine Culture - Preliminary





 Urine,Voided    Gram Neg Bacilli


 


 05/23/20 15:36 Blood Culture - Preliminary





 Blood    No Growth after 24 hours


 


 05/23/20 20:14 Gram Stain - Preliminary





 Sputum Sputum Culture - Preliminary














Assessment and Plan


Assessment: 





Impression:


Acute hypoxic respiratory failure, suspect abdominal sepsis.  And septic shock. 

Doubt ischemic colitis considering the dramatic improvement in the last 24 

hours.


Acute blood loss anemia with upper GI bleeding noted after placement of 

nasogastric tube.


Acute thrombocytopenia secondary to sepsis.  Persists.  We will have to monitor 

her platelets closely.


Shock liver secondary to hypotension and could also be related to history of 

alcohol abuse.  Acute alcoholic hepatitis is suspected.


Mental status change, suspect hepatic encephalopathy with elevated ammonia 

level.


Acute kidney injury secondary to acute tubular necrosis and hypotension.


Severe anion gap metabolic acidosis, secondary to sepsis and acute kidney 

injury.  Upon presentation however this has resolved.


Acute lactic acidosis, multifactorial, could be related to sepsis and septic 

shock, improving.


Possible urinary tract infection.  Urine is showing gram-negative bacilli, we'll

continue Zosyn for now


Acute alcohol intoxication based on her alcohol level on presentation.  








Recommendation:


Continue present supportive care measures including


Ventilatory support.  Patient is not yet ready for weaning.  However that will 

be considered in the next 24 hours.


Taper and discontinue norepinephrine if possible.


GI and DVT prophylaxis.  Avoid anticoagulation therapy.  Patient continues to 

have low platelets.


Hemodialysis as recommended by nephrology today.  Patient will have a dialysis 

catheter placed.


Continue Flagyl and Zosyn.


Continue to monitor hemoglobin and platelets.


Discontinue bicarb early this morning.


Discontinue octreotide.


Overall prognosis is extremely poor and guarded. 


Overall, the patient has made a significant improvement We'll continue to 

follow. 


Critical care time is 33 minutes.











Time with Patient: Greater than 30

## 2020-05-25 NOTE — P.CRDCN
History of Present Illness


Consult date: 05/24/20


History of present illness: 





Kenzie Renae


This is Dr. Rutledge dictating a consult on this patient


Seen on 24th of May 2020





IMPRESSION / ASSESSMENT: 


Patient with multiple medical problems with a systolic arrest


Hyperkalemic


From a cardiac standpoint she had a mildly abnormal ECG with slightly peaked T 

waves but was also hyperkalemic.  This is being treated


At lites are normal creatinine is elevated she is a low calcium abnormal LFTs





PLAN:


From a cardiac standpoint no further workup at this point indicated


Her prognosis is very guarded and a condition critical but primarily from 

noncardiac issues


Continue ICU care


Please call cardiology for any new cardiac issues





HPI


49-year-old Afro-American female brought to the emergency room unresponsive


History of alcohol use and depression


Became hypertensive started on norepinephrine later became bradycardic and asys

tolic CPR done patient intubated


Noted to have an elevated potassium and this was treated


Liver enzymes elevated


Creatinine elevated


Ammonia level CXCIX


Serum alcohol 274


Computed tomography scan of the brain did not show any intracranial hemorrhage


CT of the abdomen and pelvis showed severe pancolitis, mesenteric edema and 

small volume ascites


Suggestion of ischemic bowel


Seen by surgery


Deemed not to be a surgical candidate


Treated with IV antibiotics and packed red cells for anemia


Acetaminophen level in the serum of around 11.5


Currently called.  The T waves


Patient was hyperkalemic





ROS: 


Not available





EXAMINATION:


Patient intubated





REVIEW OF LABS, ECG & MEDICAL DATA


Acidotic


Hyperkalemic


Labs described above





















































Past Medical History


Past Medical History: Unable to Obtain


Additional Past Medical History / Comment(s): breast cancer, drug abuse, suicide

attempt


History of Any Multi-Drug Resistant Organisms: Unobtainable


Past Surgical History: Unable to Obtain


Additional Past Surgical History / Comment(s): implantable cardiac monitor


Past Anesthesia/Blood Transfusion Reactions: No Reported Reaction


Past Psychological History: Unable to Obtain


Smoking Status: Unknown if ever smoked


Past Alcohol Use History: Unable to Obtain


Past Drug Use History: Unable to Obtain





Medications and Allergies


                                Home Medications











 Medication  Instructions  Recorded  Confirmed  Type


 


Unable To Assess [Unable to Assess]  05/23/20 05/23/20 History








                                    Allergies











Allergy/AdvReac Type Severity Reaction Status Date / Time


 


Unable to Assess Allergy   Verified 05/23/20 12:38














Physical Exam


Vitals: 


                                   Vital Signs











  Temp Pulse Pulse Resp BP Pulse Ox


 


 05/25/20 07:00   82   20  122/71  99


 


 05/25/20 06:45   82   20  123/71  99


 


 05/25/20 06:30   81   20  128/74  99


 


 05/25/20 06:15   74   20  128/76  100


 


 05/25/20 06:00   80   20  121/68  99


 


 05/25/20 05:45   82   20  123/68  100


 


 05/25/20 05:30   81   20  124/71  100


 


 05/25/20 05:15   79   20  125/75  99


 


 05/25/20 05:00   78   20  126/72  99


 


 05/25/20 04:45   80   20  122/71  99


 


 05/25/20 04:30   83   20  130/72  99


 


 05/25/20 04:15   81   20  53/36  98


 


 05/25/20 04:00  98.1 F  87   20  131/76  91 L


 


 05/25/20 03:45   89   20  120/66 


 


 05/25/20 03:30   93   20  120/68  98


 


 05/25/20 03:15   92   20  115/67  99


 


 05/25/20 03:00   92   20  119/68  99


 


 05/25/20 02:45   92   20  117/66  99


 


 05/25/20 02:30   91   20  118/68  100


 


 05/25/20 02:15   88   20  114/65  99


 


 05/25/20 02:00   91   20  124/68  100


 


 05/25/20 01:45   90   20  123/70  99


 


 05/25/20 01:30   89   20  113/69  99


 


 05/25/20 01:15   92   20  122/69  100


 


 05/25/20 01:00   89   20  119/69  98


 


 05/25/20 00:45   88   20  132/76  100


 


 05/25/20 00:30   85   20  129/72  99


 


 05/25/20 00:17   85   20  129/72  99


 


 05/25/20 00:15   86   20  121/69  98


 


 05/25/20 00:00  97.9 F  89   20  121/67  99


 


 05/24/20 23:45   90   20  128/74  99


 


 05/24/20 23:30   87   20  134/79  99


 


 05/24/20 23:15   82   20  133/76  99


 


 05/24/20 23:00   93   20  137/78  99


 


 05/24/20 22:45   84   20  132/79  100


 


 05/24/20 22:30   96   23  136/75  100


 


 05/24/20 22:15   84   20  131/78  99


 


 05/24/20 22:00   87   20  150/87  99


 


 05/24/20 21:45   97   23  136/68  99


 


 05/24/20 21:30   89   21  125/71  99


 


 05/24/20 21:15   89   20  130/73  99


 


 05/24/20 21:00   98   20  123/70  99


 


 05/24/20 20:45   90   20  124/71  98


 


 05/24/20 20:30   92   20  119/68  98


 


 05/24/20 20:15   93   20  120/62  98


 


 05/24/20 20:00  98.4 F  96   20  118/62  97


 


 05/24/20 19:45   96   20  116/60  97


 


 05/24/20 19:30   96   20  119/63  96


 


 05/24/20 19:15   97   20  125/62  97


 


 05/24/20 19:00   98   20  121/66  97


 


 05/24/20 18:45   98   20  118/58  96


 


 05/24/20 18:30   98   20  118/58  96


 


 05/24/20 18:15   98   20  118/59  96


 


 05/24/20 18:00   98   21  115/55  96


 


 05/24/20 17:45   99   25 H  121/59  96


 


 05/24/20 17:30   101 H   20  118/65  97


 


 05/24/20 17:15   102 H   12  117/63  97


 


 05/24/20 17:00   103 H   15  121/65  97


 


 05/24/20 16:45   105 H   22  117/64  97


 


 05/24/20 16:30   104 H   18  116/60  97


 


 05/24/20 16:15   105 H   21  111/61  97


 


 05/24/20 16:00   105 H   23  116/59  97


 


 05/24/20 15:47    122 H   


 


 05/24/20 15:45   105 H   24  126/70  99


 


 05/24/20 15:30   108 H   27 H  133/79  99


 


 05/24/20 15:15   108 H   25 H  115/65  99


 


 05/24/20 15:00   104 H   22  117/72  99


 


 05/24/20 14:45   110 H   28 H  127/71  99


 


 05/24/20 14:30   107 H   27 H  117/60  99


 


 05/24/20 14:15   107 H   27 H  130/72  99


 


 05/24/20 14:00   105 H   27 H  126/67  97


 


 05/24/20 13:45   105 H   26 H  126/66 


 


 05/24/20 13:30   108 H   29 H  134/75  97


 


 05/24/20 13:15   109 H   27 H  137/71  99


 


 05/24/20 13:00   109 H   29 H  146/74  99


 


 05/24/20 12:45   103 H   27 H  113/75  99


 


 05/24/20 12:30   107 H   30 H  128/67  98


 


 05/24/20 12:15   109 H   27 H  127/73  96


 


 05/24/20 12:00  99.9 F H  108 H   28 H  122/67  98


 


 05/24/20 11:45   110 H   28 H  119/64  98


 


 05/24/20 11:38    122 H   


 


 05/24/20 11:30   107 H   29 H  119/62  98


 


 05/24/20 11:15   111 H   30 H  114/59  99


 


 05/24/20 11:00   107 H   29 H  114/59  98


 


 05/24/20 10:45   111 H   28 H  111/54  98


 


 05/24/20 10:30   109 H   27 H  112/64  99


 


 05/24/20 10:15   110 H   29 H  124/62  98


 


 05/24/20 10:00   111 H   29 H  117/57  97


 


 05/24/20 09:45   109 H   29 H  111/56  99


 


 05/24/20 09:30   112 H   28 H  112/54  99


 


 05/24/20 09:15   113 H   26 H  115/55  99


 


 05/24/20 09:00   117 H   31 H  112/54  98


 


 05/24/20 08:45   114 H   24  112/53  99


 


 05/24/20 08:30   114 H   25 H  112/54  99


 


 05/24/20 08:15   118 H   30 H  108/51  99


 


 05/24/20 08:00  99.7 F H  113 H   24  105/53  98


 


 05/24/20 07:45   115 H   24  109/48  99


 


 05/24/20 07:39    122 H   


 


 05/24/20 07:30   112 H   23  105/45  98








                                Intake and Output











 05/24/20 05/25/20 05/25/20





 22:59 06:59 14:59


 


Intake Total 0961.187 6959.892 98


 


Output Total 25 25 


 


Balance 8109.547 4211.892 98


 


Intake:   


 


  IV 1284 1084 98


 


    0.9 KVO 60 160 20


 


    Dextrose 5% in Water 1, 1200 900 75





    000 ml @ 75 mls/hr IV .   





    [id number] PATRICIA with Sodium   





    Bicarb (1 Meq/ml) 150 ml   





    Rx#:[id number]   


 


    pressure bag 24 24 3


 


  Intake, IV Titration 601.464 220.892 





  Amount   


 


    Calcium Gluconate 1 gm In 100  





    Sodium Chloride 0.9% 100   





    ml @ 100 mls/hr IVPB   





    ONCE ONE Rx#:[id number]   


 


    Insulin Regular 100 unit 38.245 24.610 





    In Sodium Chloride 0.9%   





    100 ml @ Per Protocol IV   





    .Q0M PATRICIA Rx#:[id number]   


 


    Norepinephrine 32 mg In 69.474 25.973 





    Sodium Chloride 0.9% 218   





    ml @ 0.6 MCG/KG/MIN 23.   





    766 mls/hr IV .[id number]   





    [facility] Rx#:[id number]   


 


    Potassium Chloride 10 meq 100  





    In Water For Injection 1   





    100ml.bag @ 100 mls/hr   





    IVPB Q1H [facility] Rx#:   





    [id number]   


 


    Propofol 1,000 mg In 152.475 166.586 





    Empty Bag 1 bag @ Titrate   





    IV .Q0M PATRICIA Rx#:   





    [id number]   


 


    Sodium Chloride 0.9% 150 141.270 3.723 





    ml @ 0.04 UNITS/MIN 6.12   





    mls/hr IV .Q24H PATRICIA with   





    Vasopressin 60 unit Rx#:   





    [id number]   


 


Output:   


 


  Urine 25 25 


 


Other:   


 


  Voiding Method Indwelling Catheter Indwelling Catheter 


 


  # Bowel Movements  1 


 


  Weight  90.8 kg 








                         ABP, PAP, CO, CI - Last 8 Hours











Arterial Blood Pressure        130/53


 


Arterial Blood Pressure        138/59


 


Arterial Blood Pressure        141/60


 


Arterial Blood Pressure        141/62


 


Arterial Blood Pressure        147/64


 


Arterial Blood Pressure        128/58


 


Arterial Blood Pressure        133/60


 


Arterial Blood Pressure        138/61


 


Arterial Blood Pressure        137/62


 


Arterial Blood Pressure        140/62


 


Arterial Blood Pressure        145/62


 


Arterial Blood Pressure        153/64


 


Arterial Blood Pressure        141/68


 


Arterial Blood Pressure        149/66


 


Arterial Blood Pressure        109/52


 


Arterial Blood Pressure        115/54


 


Arterial Blood Pressure        111/50


 


Arterial Blood Pressure        125/55


 


Arterial Blood Pressure        116/50


 


Arterial Blood Pressure        124/52


 


Arterial Blood Pressure        115/50


 


Arterial Blood Pressure        124/54


 


Arterial Blood Pressure        127/55


 


Arterial Blood Pressure        111/52


 


Arterial Blood Pressure        125/56


 


Arterial Blood Pressure        122/56


 


Arterial Blood Pressure        135/62


 


Arterial Blood Pressure        139/64


 


Arterial Blood Pressure        138/63


 


Arterial Blood Pressure        115/54


 


Arterial Blood Pressure        118/55


 


Arterial Blood Pressure        137/61

















Results





                                 05/25/20 04:25





                                 05/25/20 04:25


                                 Cardiac Enzymes











  05/24/20 05/25/20 Range/Units





  06:45 04:25 


 


AST  355 H  541 H  (14-36)  U/L








                                       CBC











  05/24/20 05/24/20 05/25/20 Range/Units





  06:45 13:15 04:25 


 


WBC   8.5  8.8  (3.8-10.6)  k/uL


 


RBC   2.79 L  2.94 L  (3.80-5.40)  m/uL


 


Hgb   9.0 L  9.3 L  (11.4-16.0)  gm/dL


 


Hct   27.1 L  27.8 L  (34.0-46.0)  %


 


Plt Count  84 L  71 L  47 L  (150-450)  k/uL








                          Comprehensive Metabolic Panel











  05/24/20 05/24/20 05/25/20 Range/Units





  06:45 19:05 04:25 


 


Sodium  145  142  142  (137-145)  mmol/L


 


Potassium  5.4 H  3.8  3.9  (3.5-5.1)  mmol/L


 


Chloride  107  103  101  ()  mmol/L


 


Carbon Dioxide  10 L  24  29  (22-30)  mmol/L


 


BUN  12  15  16  (7-17)  mg/dL


 


Creatinine  3.52 H  3.66 H  4.00 H  (0.52-1.04)  mg/dL


 


Glucose   163 H  127 H  (74-99)  mg/dL


 


Calcium  6.2 L*  6.2 L*  6.5 L  (8.4-10.2)  mg/dL


 


AST  355 H   541 H  (14-36)  U/L


 


ALT  50 H   75 H  (4-34)  U/L


 


Alkaline Phosphatase  221 H   216 H  ()  U/L


 


Total Protein    5.8 L  (6.3-8.2)  g/dL


 


Albumin    2.7 L  (3.5-5.0)  g/dL








                               Current Medications











Generic Name Dose Route Start Last Admin





  Trade Name Freq  PRN Reason Stop Dose Admin


 


Chlorhexidine Gluconate  15 ml  05/23/20 21:00  05/24/20 20:18





  Peridex  MUCOUS MEM   15 ml





  BID PATRICIA   Administration


 


Hydrocortisone Sodium Succinate  100 mg  05/23/20 16:00  05/24/20 22:04





  Solu-Cortef  IV   100 mg





  TID PATRICIA   Administration


 


Midazolam HCl 50 mg/ Sodium  50 mls @ 1 mls/hr  05/23/20 14:00  05/24/20 10:15





  Chloride  IV   Infused





  .Q24H PATRICIA   Titration





  Protocol  





  1 MG/HR  


 


Metronidazole 500 mg/ IV  100 mls @ 100 mls/hr  05/23/20 16:00  05/24/20 22:04





  Solution  IVPB   100 mls/hr





  TID PATRICIA   Administration


 


Propofol 1,000 mg/ IV Solution  100 mls @ 0 mls/hr  05/23/20 18:45  05/25/20 

05:33





  IV   35 mcg/kg/min





  .Q0M PATRICIA   17.745 mls/hr





    Administration





  Protocol  





  Titrate  


 


Piperacillin Sod/Tazobactam  100 mls @ 25 mls/hr  05/24/20 05:00  05/25/20 04:44





  Sod 3.375 gm/ Sodium Chloride  IVPB   25 mls/hr





  Q12H PATRICIA   Administration


 


Vasopressin 60 unit/ Sodium  153 mls @ 6.12 mls/hr  05/23/20 21:16  05/25/20 

00:40





  Chloride  IV   0 units/min





  .Q24H PATRICIA   0 mls/hr





    Titration





  Protocol  





  0.04 UNITS/MIN  


 


Norepinephrine Bitartrate 32  250 mls @ 23.766 mls/hr  05/24/20 10:30  05/25/20 

06:49





  mg/ Sodium Chloride  IV   0.05 mcg/kg/min





  .V50I30H PATRICIA   1.98 mls/hr





    Titration





  Protocol  





  0.6 MCG/KG/MIN  


 


Insulin Human Regular 100 unit  101 mls @ 0 mls/hr  05/24/20 14:15  05/25/20 

05:33





  / Sodium Chloride  IV   0 units/hr





  .Q0M PATRICIA   0 mls/hr





    Titration





  Protocol  





  Per Protocol  


 


Lactulose  30 gm  05/23/20 15:01  05/24/20 20:17





  Cephulac  PO   30 gm





  BID PATRICIA   Administration


 


Naloxone HCl  0.2 mg  05/23/20 15:44 





  Narcan  IV  





  Q2M PRN  





  Opioid Reversal  


 


Pantoprazole Sodium  40 mg  05/24/20 09:00  05/24/20 08:26





  Protonix  IV   40 mg





  DAILY PATRICIA   Administration


 


Rifaximin  550 mg  05/24/20 09:15  05/24/20 20:20





  Xifaxan  PO  06/23/20 09:16  550 mg





  BID PATRICIA   Administration








                                Intake and Output











 05/24/20 05/25/20 05/25/20





 22:59 06:59 14:59


 


Intake Total 0831.588 4495.892 98


 


Output Total 25 25 


 


Balance 6146.125 8220.892 98


 


Intake:   


 


  IV 1284 1084 98


 


    0.9 KVO 60 160 20


 


    Dextrose 5% in Water 1, 1200 900 75





    000 ml @ 75 mls/hr IV .   





    [id number] PATRICIA with Sodium   





    Bicarb (1 Meq/ml) 150 ml   





    Rx#:[id number]   


 


    pressure bag 24 24 3


 


  Intake, IV Titration 601.464 220.892 





  Amount   


 


    Calcium Gluconate 1 gm In 100  





    Sodium Chloride 0.9% 100   





    ml @ 100 mls/hr IVPB   





    ONCE ONE Rx#:[id number]   


 


    Insulin Regular 100 unit 38.245 24.610 





    In Sodium Chloride 0.9%   





    100 ml @ Per Protocol IV   





    .Q0M PATRICIA Rx#:[id number]   


 


    Norepinephrine 32 mg In 69.474 25.973 





    Sodium Chloride 0.9% 218   





    ml @ 0.6 MCG/KG/MIN 23.   





    766 mls/hr IV .[id number]   





    [facility] Rx#:[id number]   


 


    Potassium Chloride 10 meq 100  





    In Water For Injection 1   





    100ml.bag @ 100 mls/hr   





    IVPB Q1H [facility] Rx#:   





    [id number]   


 


    Propofol 1,000 mg In 152.475 166.586 





    Empty Bag 1 bag @ Titrate   





    IV .Q0M PATRICIA Rx#:   





    [id number]   


 


    Sodium Chloride 0.9% 150 141.270 3.723 





    ml @ 0.04 UNITS/MIN 6.12   





    mls/hr IV .Q24H PATRICIA with   





    Vasopressin 60 unit Rx#:   





    [id number]   


 


Output:   


 


  Urine 25 25 


 


Other:   


 


  Voiding Method Indwelling Catheter Indwelling Catheter 


 


  # Bowel Movements  1 


 


  Weight  90.8 kg 








                                        





                                 05/25/20 04:25 





                                 05/25/20 04:25

## 2020-05-25 NOTE — PN
PROGRESS NOTE



DATE OF SERVICE:

05/25/2020



REASON FOR FOLLOWUP:

Sepsis and ischemic colitis.



INTERVAL HISTORY:

The patient is currently afebrile.  The patient has been requiring less pressor support

per the RN.  She did develop diarrhea last night for which a fecal management system

has been placed, she had culture which came back negative.  The patient remains to be

sedated on the vent.  FiO2 is currently down to 40%.  No significant purulent

secretions through the ET.



PHYSICAL EXAMINATION:

Blood pressure 102/66, pulse of 90, temperature is 98 degrees Fahrenheit.  She is 97%

on 40% FiO2.

General description is a middle-aged female intubated on the vent.

Respiratory system: Unlabored breathing.  Clear to auscultation anteriorly.

HEART: S1, S2.  Regular rate and rhythm.

ABDOMEN:  Soft, mildly tender, no guarding or rigidity.

EXTREMITIES are no edema of the feet.



LABS:

Hemoglobin 9.8, white count 8.8.  BUN of 16, creatinine 4.0.  Urine showing gram-

negative.  Sputum is currently pending.  Blood culture negative.



DIAGNOSTIC IMPRESSION AND PLAN:

Patient with sepsis with diffuse thickening of the bowel and concern for possible

ischemic colitis in this patient who _____ in the ER.  The patient is currently covered

with Zosyn to continue.  Some clinical improvement.  Stool culture has been requested

and continue supportive care.





MMODL / IJN: [id number] / Job#: [id number]

## 2020-05-25 NOTE — CONS
DATE OF CONSULTATION: 05/25/2020



This is a 49-year-old -American female who was seen on consult for 
placement of

urgent dialysis catheter.  The patient came to the ER in the past.  She was 
intubated

because of respiratory insufficiency and hypoglycemia.  The patient was seen in 
the

intensive care unit.  The patient has been intubated.



Neck is supple.  Chest is clear.  Breath sounds are diminished.  First and 
second

sounds are normal.  Abdomen is soft, nontender.  Vascular examination:  Femorals
are 1+

bilateral.



IMPRESSION:

Acute on chronic renal failure with liver failure.



PLAN:

Placement of the dialysis catheter. Risks and complications discussed.





MMODL / IJN: [id number] / Job#: [id number]

[email or web address]

## 2020-05-25 NOTE — PN
PROGRESS NOTE



DATE OF SERVICE:

05/25/2020



The patient is a 49-year-old white female with history of alcoholic liver disease,

admitted to hospital with altered mental status/unresponsive and subsequently had

cardiac arrest and presently in the ICU, intubated on the vent.  As per the nursing

staff, the patient's overall condition is gradually improving.  She has been titrated

down on the pressors.  No bleeding noted. NG-tube has bilious fluid in there. FMS  was

placed for hepatic encephalopathy and she is receiving lactulose and she had about soft

liquid stool through the FMS noted.  No acute events were noted overnight.



PHYSICAL EXAMINATION:

She remains on a vent, sedated.

VITAL SIGNS:  Blood pressure 111/48, pulse rate 20, temperature 96.

HEENT examination unremarkable. Conjunctivae pink.  Sclerae icteric.  Oral cavity no

lesions.

CHEST:  Clear to auscultation.

HEART:  Regular rate and rhythm.

ABDOMEN:  Soft.  It was slightly distended, but was very soft.

EXTREMITIES no pedal edema.

NEURO sedated.



LABS:

From today WBC 8.8, hemoglobin 9.3, platelets 47,000.  ABG, pH are 7.58, pCO2 32, PO2

117, bicarb 31, oxygen saturation 99%, and T-bilirubin is 2.2.  , ALT 75,

alkaline phosphatase 216.  Corona virus PCR is negative.  C diff negative.



IMPRESSION:

1. Acute upper gastrointestinal bleed.  The patient had small amount of bright red

    blood from the NG tube after intubation following cardiac arrest yesterday, but

    since then she did not have any further episodes of bleeding.  Hemoglobin remains

    stable at 9.3 g/dL.  Remains on IV Protonix and Sandostatin has been discontinued.

2. Acute respiratory failure.  Remains intubated, sedated on the vent on broad-

    spectrum antibiotics.

3. Hepatic encephalopathy, presently receiving lactulose and Xifaxan and is doing

    better.

4. Possible ischemic colitis, being watched conservatively by Dr. Rush.

5. Acute kidney injury secondary to prolonged hypertension associated with

    hyperkalemia.



RECOMMENDATIONS:

1. Continue symptomatic and supportive care by the intensivist.

2. Monitor LFTs closely.

3. Continue Xifaxan and lactulose.

4. Protonix 40 mg daily.

5. No plans for any endoscopy intervention at the present time.

6. Continue broad-spectrum antibiotics.  We will follow with you closely.

Thank you for this consultation.





MMODL / IJN: [id number] / Job#: [id number]

## 2020-05-25 NOTE — P.PN
Subjective


Progress Note Date: 05/25/20





Patient is weaning pressors, responding to pain, lactic acidosis improving.  

Patient had BM overnight. Liquid brown. 





Objective





- Vital Signs


Vital signs: 


                                   Vital Signs











Temp  97.7 F   05/25/20 08:00


 


Pulse  92   05/25/20 09:00


 


Resp  20   05/25/20 09:00


 


BP  113/69   05/25/20 09:00


 


Pulse Ox  100   05/25/20 09:00








                                 Intake & Output











 05/24/20 05/25/20 05/25/20





 18:59 06:59 18:59


 


Intake Total 2338.249 2194.199 294


 


Output Total 35 45 21


 


Balance 2303.249 2149.199 273


 


Weight  90.8 kg 


 


Intake:   


 


  IV 1989 1603 294


 


    0.9 KVO  220 60


 


    Dextrose 5% in Water 1, 1950 1350 225





    000 ml @ 75 mls/hr IV .   





    [id number] [facility] with Sodium   





    Bicarb (1 Meq/ml) 150 ml   





    Rx#:[id number]   


 


    pressure bag 39 33 9


 


  Intake, IV Titration 349.249 591.199 0





  Amount   


 


    Calcium Gluconate 1 gm In  100 





    Sodium Chloride 0.9% 100   





    ml @ 100 mls/hr IVPB   





    ONCE ONE Rx#:[id number]   


 


    Insulin Regular 100 unit 19.560 43.295 0





    In Sodium Chloride 0.9%   





    100 ml @ Per Protocol IV   





    .Q0M [facility] Rx#:[id number]   


 


    Midazolam HCl 50 mg In 50  





    Sodium Chloride 0.9% 40   





    ml @ 1 MG/HR 1 mls/hr IV   





    .Q24H [facility] Rx#:[id number]   


 


    Norepinephrine 32 mg In 93.874 58.215 





    Sodium Chloride 0.9% 218   





    ml @ 0.6 MCG/KG/MIN 23.   





    766 mls/hr IV .E85Q13M   





    [facility] Rx#:[id number]   


 


    Potassium Chloride 10 meq  100 





    In Water For Injection 1   





    100ml.bag @ 100 mls/hr   





    IVPB Q1H [facility] Rx#:   





    [id number]   


 


    Propofol 1,000 mg In 63.925 266.586 





    Empty Bag 1 bag @ Titrate   





    IV .Q0M [facility] Rx#:   





    [id number]   


 


    Sodium Chloride 0.9% 150 121.890 23.103 





    ml @ 0.04 UNITS/MIN 6.12   





    mls/hr IV .Q24H [facility] with   





    Vasopressin 60 unit Rx#:   





    [id number]   


 


Output:   


 


  Urine 35 45 21


 


Other:   


 


  Voiding Method Indwelling Catheter Indwelling Catheter Indwelling Catheter


 


  # Voids 0  


 


  # Bowel Movements  1 








                       ABP, PAP, CO, CI - Last Documented











Arterial Blood Pressure        117/43

















- Constitutional


Constitutional Comment(s): 





Intubated and sedated





- Respiratory


Details: 





Intubated and sedated





- Cardiovascular


Rhythm: regular





- Gastrointestinal


Gastrointestinal Comment(s): 





Soft, nondistended, nontender





- Psychiatric


Psychiatric: Present: A&O x's 3





- Labs


CBC & Chem 7: 


                                 05/25/20 04:25





                                 05/25/20 04:25


Labs: 


                  Abnormal Lab Results - Last 24 Hours (Table)











  05/24/20 05/24/20 05/24/20 Range/Units





  11:09 13:15 14:06 


 


RBC   2.79 L   (3.80-5.40)  m/uL


 


Hgb   9.0 L   (11.4-16.0)  gm/dL


 


Hct   27.1 L   (34.0-46.0)  %


 


RDW   20.4 H   (11.5-15.5)  %


 


Plt Count   71 L   (150-450)  k/uL


 


Lymphocytes # (Manual)     (1.0-4.8)  k/uL


 


Metamyelocytes # (Man)     (0)  k/uL


 


Nucleated RBCs     (0-0)  /100 WBC


 


ABG pH     (7.35-7.45)  


 


ABG pCO2     (35-45)  mmHg


 


ABG pO2     ()  mmHg


 


ABG HCO3     (21-25)  mmol/L


 


ABG Total CO2     (19-24)  mmol/L


 


ABG O2 Saturation     (94-97)  %


 


Creatinine     (0.52-1.04)  mg/dL


 


Glucose     (74-99)  mg/dL


 


POC Glucose (mg/dL)  264 H   261 H  (75-99)  mg/dL


 


Plasma Lactic Acid Scott     (0.7-2.0)  mmol/L


 


Calcium     (8.4-10.2)  mg/dL


 


Total Bilirubin     (0.2-1.3)  mg/dL


 


AST     (14-36)  U/L


 


ALT     (4-34)  U/L


 


Alkaline Phosphatase     ()  U/L


 


Total Protein     (6.3-8.2)  g/dL


 


Albumin     (3.5-5.0)  g/dL














  05/24/20 05/24/20 05/24/20 Range/Units





  14:09 16:28 17:09 


 


RBC     (3.80-5.40)  m/uL


 


Hgb     (11.4-16.0)  gm/dL


 


Hct     (34.0-46.0)  %


 


RDW     (11.5-15.5)  %


 


Plt Count     (150-450)  k/uL


 


Lymphocytes # (Manual)     (1.0-4.8)  k/uL


 


Metamyelocytes # (Man)     (0)  k/uL


 


Nucleated RBCs     (0-0)  /100 WBC


 


ABG pH     (7.35-7.45)  


 


ABG pCO2     (35-45)  mmHg


 


ABG pO2     ()  mmHg


 


ABG HCO3     (21-25)  mmol/L


 


ABG Total CO2     (19-24)  mmol/L


 


ABG O2 Saturation     (94-97)  %


 


Creatinine     (0.52-1.04)  mg/dL


 


Glucose     (74-99)  mg/dL


 


POC Glucose (mg/dL)   224 H  200 H  (75-99)  mg/dL


 


Plasma Lactic Acid Scott  13.7 H*    (0.7-2.0)  mmol/L


 


Calcium     (8.4-10.2)  mg/dL


 


Total Bilirubin     (0.2-1.3)  mg/dL


 


AST     (14-36)  U/L


 


ALT     (4-34)  U/L


 


Alkaline Phosphatase     ()  U/L


 


Total Protein     (6.3-8.2)  g/dL


 


Albumin     (3.5-5.0)  g/dL














  05/24/20 05/24/20 05/24/20 Range/Units





  17:43 18:38 19:05 


 


RBC     (3.80-5.40)  m/uL


 


Hgb     (11.4-16.0)  gm/dL


 


Hct     (34.0-46.0)  %


 


RDW     (11.5-15.5)  %


 


Plt Count     (150-450)  k/uL


 


Lymphocytes # (Manual)     (1.0-4.8)  k/uL


 


Metamyelocytes # (Man)     (0)  k/uL


 


Nucleated RBCs     (0-0)  /100 WBC


 


ABG pH     (7.35-7.45)  


 


ABG pCO2  32 L    (35-45)  mmHg


 


ABG pO2     ()  mmHg


 


ABG HCO3     (21-25)  mmol/L


 


ABG Total CO2     (19-24)  mmol/L


 


ABG O2 Saturation  97.1 H    (94-97)  %


 


Creatinine    3.66 H  (0.52-1.04)  mg/dL


 


Glucose    163 H  (74-99)  mg/dL


 


POC Glucose (mg/dL)   183 H   (75-99)  mg/dL


 


Plasma Lactic Acid Scott     (0.7-2.0)  mmol/L


 


Calcium    6.2 L*  (8.4-10.2)  mg/dL


 


Total Bilirubin     (0.2-1.3)  mg/dL


 


AST     (14-36)  U/L


 


ALT     (4-34)  U/L


 


Alkaline Phosphatase     ()  U/L


 


Total Protein     (6.3-8.2)  g/dL


 


Albumin     (3.5-5.0)  g/dL














  05/24/20 05/24/20 05/24/20 Range/Units





  20:27 21:24 21:59 


 


RBC     (3.80-5.40)  m/uL


 


Hgb     (11.4-16.0)  gm/dL


 


Hct     (34.0-46.0)  %


 


RDW     (11.5-15.5)  %


 


Plt Count     (150-450)  k/uL


 


Lymphocytes # (Manual)     (1.0-4.8)  k/uL


 


Metamyelocytes # (Man)     (0)  k/uL


 


Nucleated RBCs     (0-0)  /100 WBC


 


ABG pH     (7.35-7.45)  


 


ABG pCO2     (35-45)  mmHg


 


ABG pO2     ()  mmHg


 


ABG HCO3     (21-25)  mmol/L


 


ABG Total CO2     (19-24)  mmol/L


 


ABG O2 Saturation     (94-97)  %


 


Creatinine     (0.52-1.04)  mg/dL


 


Glucose     (74-99)  mg/dL


 


POC Glucose (mg/dL)  151 H  168 H  170 H  (75-99)  mg/dL


 


Plasma Lactic Acid Scott     (0.7-2.0)  mmol/L


 


Calcium     (8.4-10.2)  mg/dL


 


Total Bilirubin     (0.2-1.3)  mg/dL


 


AST     (14-36)  U/L


 


ALT     (4-34)  U/L


 


Alkaline Phosphatase     ()  U/L


 


Total Protein     (6.3-8.2)  g/dL


 


Albumin     (3.5-5.0)  g/dL














  05/24/20 05/24/20 05/25/20 Range/Units





  23:33 23:35 00:35 


 


RBC     (3.80-5.40)  m/uL


 


Hgb     (11.4-16.0)  gm/dL


 


Hct     (34.0-46.0)  %


 


RDW     (11.5-15.5)  %


 


Plt Count     (150-450)  k/uL


 


Lymphocytes # (Manual)     (1.0-4.8)  k/uL


 


Metamyelocytes # (Man)     (0)  k/uL


 


Nucleated RBCs     (0-0)  /100 WBC


 


ABG pH     (7.35-7.45)  


 


ABG pCO2     (35-45)  mmHg


 


ABG pO2     ()  mmHg


 


ABG HCO3     (21-25)  mmol/L


 


ABG Total CO2     (19-24)  mmol/L


 


ABG O2 Saturation     (94-97)  %


 


Creatinine     (0.52-1.04)  mg/dL


 


Glucose     (74-99)  mg/dL


 


POC Glucose (mg/dL)  170 H   184 H  (75-99)  mg/dL


 


Plasma Lactic Acid Scott   6.2 H*   (0.7-2.0)  mmol/L


 


Calcium     (8.4-10.2)  mg/dL


 


Total Bilirubin     (0.2-1.3)  mg/dL


 


AST     (14-36)  U/L


 


ALT     (4-34)  U/L


 


Alkaline Phosphatase     ()  U/L


 


Total Protein     (6.3-8.2)  g/dL


 


Albumin     (3.5-5.0)  g/dL














  05/25/20 05/25/20 05/25/20 Range/Units





  02:11 03:34 04:22 


 


RBC     (3.80-5.40)  m/uL


 


Hgb     (11.4-16.0)  gm/dL


 


Hct     (34.0-46.0)  %


 


RDW     (11.5-15.5)  %


 


Plt Count     (150-450)  k/uL


 


Lymphocytes # (Manual)     (1.0-4.8)  k/uL


 


Metamyelocytes # (Man)     (0)  k/uL


 


Nucleated RBCs     (0-0)  /100 WBC


 


ABG pH     (7.35-7.45)  


 


ABG pCO2     (35-45)  mmHg


 


ABG pO2     ()  mmHg


 


ABG HCO3     (21-25)  mmol/L


 


ABG Total CO2     (19-24)  mmol/L


 


ABG O2 Saturation     (94-97)  %


 


Creatinine     (0.52-1.04)  mg/dL


 


Glucose     (74-99)  mg/dL


 


POC Glucose (mg/dL)  148 H  152 H  138 H  (75-99)  mg/dL


 


Plasma Lactic Acid Scott     (0.7-2.0)  mmol/L


 


Calcium     (8.4-10.2)  mg/dL


 


Total Bilirubin     (0.2-1.3)  mg/dL


 


AST     (14-36)  U/L


 


ALT     (4-34)  U/L


 


Alkaline Phosphatase     ()  U/L


 


Total Protein     (6.3-8.2)  g/dL


 


Albumin     (3.5-5.0)  g/dL














  05/25/20 05/25/20 05/25/20 Range/Units





  04:25 04:25 04:25 


 


RBC   2.94 L   (3.80-5.40)  m/uL


 


Hgb   9.3 L   (11.4-16.0)  gm/dL


 


Hct   27.8 L   (34.0-46.0)  %


 


RDW   20.3 H   (11.5-15.5)  %


 


Plt Count   47 L   (150-450)  k/uL


 


Lymphocytes # (Manual)   0.79 L   (1.0-4.8)  k/uL


 


Metamyelocytes # (Man)   0.18 H   (0)  k/uL


 


Nucleated RBCs   1 H   (0-0)  /100 WBC


 


ABG pH     (7.35-7.45)  


 


ABG pCO2     (35-45)  mmHg


 


ABG pO2     ()  mmHg


 


ABG HCO3     (21-25)  mmol/L


 


ABG Total CO2     (19-24)  mmol/L


 


ABG O2 Saturation     (94-97)  %


 


Creatinine    4.00 H  (0.52-1.04)  mg/dL


 


Glucose    127 H  (74-99)  mg/dL


 


POC Glucose (mg/dL)     (75-99)  mg/dL


 


Plasma Lactic Acid Scott  5.0 H*    (0.7-2.0)  mmol/L


 


Calcium    6.5 L  (8.4-10.2)  mg/dL


 


Total Bilirubin    2.2 H  (0.2-1.3)  mg/dL


 


AST    541 H  (14-36)  U/L


 


ALT    75 H  (4-34)  U/L


 


Alkaline Phosphatase    216 H  ()  U/L


 


Total Protein    5.8 L  (6.3-8.2)  g/dL


 


Albumin    2.7 L  (3.5-5.0)  g/dL














  05/25/20 05/25/20 05/25/20 Range/Units





  05:31 06:30 07:27 


 


RBC     (3.80-5.40)  m/uL


 


Hgb     (11.4-16.0)  gm/dL


 


Hct     (34.0-46.0)  %


 


RDW     (11.5-15.5)  %


 


Plt Count     (150-450)  k/uL


 


Lymphocytes # (Manual)     (1.0-4.8)  k/uL


 


Metamyelocytes # (Man)     (0)  k/uL


 


Nucleated RBCs     (0-0)  /100 WBC


 


ABG pH    7.58 H*  (7.35-7.45)  


 


ABG pCO2    32 L  (35-45)  mmHg


 


ABG pO2    117 H  ()  mmHg


 


ABG HCO3    30 H  (21-25)  mmol/L


 


ABG Total CO2    31 H  (19-24)  mmol/L


 


ABG O2 Saturation    99.1 H  (94-97)  %


 


Creatinine     (0.52-1.04)  mg/dL


 


Glucose     (74-99)  mg/dL


 


POC Glucose (mg/dL)  120 H  127 H   (75-99)  mg/dL


 


Plasma Lactic Acid Scott     (0.7-2.0)  mmol/L


 


Calcium     (8.4-10.2)  mg/dL


 


Total Bilirubin     (0.2-1.3)  mg/dL


 


AST     (14-36)  U/L


 


ALT     (4-34)  U/L


 


Alkaline Phosphatase     ()  U/L


 


Total Protein     (6.3-8.2)  g/dL


 


Albumin     (3.5-5.0)  g/dL














  05/25/20 05/25/20 Range/Units





  08:06 09:05 


 


RBC    (3.80-5.40)  m/uL


 


Hgb    (11.4-16.0)  gm/dL


 


Hct    (34.0-46.0)  %


 


RDW    (11.5-15.5)  %


 


Plt Count    (150-450)  k/uL


 


Lymphocytes # (Manual)    (1.0-4.8)  k/uL


 


Metamyelocytes # (Man)    (0)  k/uL


 


Nucleated RBCs    (0-0)  /100 WBC


 


ABG pH    (7.35-7.45)  


 


ABG pCO2    (35-45)  mmHg


 


ABG pO2    ()  mmHg


 


ABG HCO3    (21-25)  mmol/L


 


ABG Total CO2    (19-24)  mmol/L


 


ABG O2 Saturation    (94-97)  %


 


Creatinine    (0.52-1.04)  mg/dL


 


Glucose    (74-99)  mg/dL


 


POC Glucose (mg/dL)  172 H  187 H  (75-99)  mg/dL


 


Plasma Lactic Acid Scott    (0.7-2.0)  mmol/L


 


Calcium    (8.4-10.2)  mg/dL


 


Total Bilirubin    (0.2-1.3)  mg/dL


 


AST    (14-36)  U/L


 


ALT    (4-34)  U/L


 


Alkaline Phosphatase    ()  U/L


 


Total Protein    (6.3-8.2)  g/dL


 


Albumin    (3.5-5.0)  g/dL








                      Microbiology - Last 24 Hours (Table)











 05/23/20 15:36 Blood Culture - Preliminary





 Blood    No Growth after 24 hours


 


 05/23/20 20:14 Gram Stain - Preliminary





 Sputum Sputum Culture - Preliminary


 


 05/23/20 14:22 Urine Culture - Preliminary





 Urine,Voided 














Assessment and Plan


Assessment: 


VDRF, Colitis, likely septic shock, UTI, cirrhosis, JACE  


Plan: 


Patient appears to be improved today, lactic acidosis is resolving and she is 

weaning pressors. Abdomen is soft and nontender at this time. Patient does 

respond to painful stimuli and abdominal exam does not illicit painful response.

She is passing brown stool without gross blood. Overall clinical picture makes 

ischemic colon less likely as a primary process. No plans for surgical 

intervention at this time. Patient is still critically ill with poor prognosis 

overall.

## 2020-05-26 LAB
ALBUMIN SERPL-MCNC: 2.2 G/DL (ref 3.5–5)
ALP SERPL-CCNC: 187 U/L (ref 38–126)
ALT SERPL-CCNC: 67 U/L (ref 4–34)
ANION GAP SERPL CALC-SCNC: 6 MMOL/L
AST SERPL-CCNC: 363 U/L (ref 14–36)
BASOPHILS # BLD AUTO: 0 K/UL (ref 0–0.2)
BASOPHILS NFR BLD AUTO: 0 %
BUN SERPL-SCNC: 15 MG/DL (ref 7–17)
CALCIUM SPEC-MCNC: 6.6 MG/DL (ref 8.4–10.2)
CHLORIDE SERPL-SCNC: 101 MMOL/L (ref 98–107)
CO2 BLDA-SCNC: 33 MMOL/L (ref 19–24)
CO2 SERPL-SCNC: 32 MMOL/L (ref 22–30)
EOSINOPHIL # BLD AUTO: 0.3 K/UL (ref 0–0.7)
EOSINOPHIL NFR BLD AUTO: 3 %
ERYTHROCYTE [DISTWIDTH] IN BLOOD BY AUTOMATED COUNT: 3.05 M/UL (ref 3.8–5.4)
ERYTHROCYTE [DISTWIDTH] IN BLOOD: 19.7 % (ref 11.5–15.5)
GLUCOSE BLD-MCNC: 108 MG/DL (ref 75–99)
GLUCOSE BLD-MCNC: 111 MG/DL (ref 75–99)
GLUCOSE BLD-MCNC: 119 MG/DL (ref 75–99)
GLUCOSE BLD-MCNC: 124 MG/DL (ref 75–99)
GLUCOSE BLD-MCNC: 126 MG/DL (ref 75–99)
GLUCOSE BLD-MCNC: 127 MG/DL (ref 75–99)
GLUCOSE BLD-MCNC: 128 MG/DL (ref 75–99)
GLUCOSE BLD-MCNC: 128 MG/DL (ref 75–99)
GLUCOSE BLD-MCNC: 135 MG/DL (ref 75–99)
GLUCOSE BLD-MCNC: 138 MG/DL (ref 75–99)
GLUCOSE BLD-MCNC: 140 MG/DL (ref 75–99)
GLUCOSE BLD-MCNC: 144 MG/DL (ref 75–99)
GLUCOSE BLD-MCNC: 153 MG/DL (ref 75–99)
GLUCOSE BLD-MCNC: 157 MG/DL (ref 75–99)
GLUCOSE BLD-MCNC: 159 MG/DL (ref 75–99)
GLUCOSE BLD-MCNC: 160 MG/DL (ref 75–99)
GLUCOSE BLD-MCNC: 162 MG/DL (ref 75–99)
GLUCOSE BLD-MCNC: 167 MG/DL (ref 75–99)
GLUCOSE BLD-MCNC: 177 MG/DL (ref 75–99)
GLUCOSE BLD-MCNC: 204 MG/DL (ref 75–99)
GLUCOSE SERPL-MCNC: 129 MG/DL (ref 74–99)
HBV SURFACE AB SERPL IA-ACNC: 3.5 MIU/ML
HCO3 BLDA-SCNC: 32 MMOL/L (ref 21–25)
HCT VFR BLD AUTO: 28.7 % (ref 34–46)
HGB BLD-MCNC: 9.1 GM/DL (ref 11.4–16)
LACTATE BLDV-SCNC: 1.7 MMOL/L (ref 0.7–2)
LYMPHOCYTES # SPEC AUTO: 1 K/UL (ref 1–4.8)
LYMPHOCYTES NFR SPEC AUTO: 10 %
MCH RBC QN AUTO: 29.8 PG (ref 25–35)
MCHC RBC AUTO-ENTMCNC: 31.7 G/DL (ref 31–37)
MCV RBC AUTO: 94.1 FL (ref 80–100)
MONOCYTES # BLD AUTO: 0.4 K/UL (ref 0–1)
MONOCYTES NFR BLD AUTO: 5 %
NEUTROPHILS # BLD AUTO: 7.6 K/UL (ref 1.3–7.7)
NEUTROPHILS NFR BLD AUTO: 81 %
PCO2 BLDA: 42 MMHG (ref 35–45)
PH BLDA: 7.5 [PH] (ref 7.35–7.45)
PLATELET # BLD AUTO: 40 K/UL (ref 150–450)
PO2 BLDA: 146 MMHG (ref 83–108)
POTASSIUM SERPL-SCNC: 3.8 MMOL/L (ref 3.5–5.1)
PROT SERPL-MCNC: 5 G/DL (ref 6.3–8.2)
SODIUM SERPL-SCNC: 139 MMOL/L (ref 137–145)
WBC # BLD AUTO: 9.4 K/UL (ref 3.8–10.6)

## 2020-05-26 RX ADMIN — NOREPINEPHRINE BITARTRATE SCH: 1 INJECTION, SOLUTION, CONCENTRATE INTRAVENOUS at 04:53

## 2020-05-26 RX ADMIN — PIPERACILLIN AND TAZOBACTAM SCH MLS/HR: 3; .375 INJECTION, POWDER, FOR SOLUTION INTRAVENOUS at 18:30

## 2020-05-26 RX ADMIN — CHLORHEXIDINE GLUCONATE SCH ML: 1.2 RINSE ORAL at 08:41

## 2020-05-26 RX ADMIN — NOREPINEPHRINE BITARTRATE SCH MLS/HR: 1 INJECTION, SOLUTION, CONCENTRATE INTRAVENOUS at 00:29

## 2020-05-26 RX ADMIN — METRONIDAZOLE SCH MLS/HR: 500 INJECTION, SOLUTION INTRAVENOUS at 17:01

## 2020-05-26 RX ADMIN — METRONIDAZOLE SCH MLS/HR: 500 INJECTION, SOLUTION INTRAVENOUS at 08:46

## 2020-05-26 RX ADMIN — PROPOFOL SCH MLS/HR: 10 INJECTION, EMULSION INTRAVENOUS at 00:28

## 2020-05-26 RX ADMIN — CEFAZOLIN SCH MLS/HR: 330 INJECTION, POWDER, FOR SOLUTION INTRAMUSCULAR; INTRAVENOUS at 19:46

## 2020-05-26 RX ADMIN — PIPERACILLIN AND TAZOBACTAM SCH MLS/HR: 3; .375 INJECTION, POWDER, FOR SOLUTION INTRAVENOUS at 04:53

## 2020-05-26 RX ADMIN — RIFAXIMIN SCH MG: 550 TABLET ORAL at 08:47

## 2020-05-26 RX ADMIN — CHLORHEXIDINE GLUCONATE SCH ML: 1.2 RINSE ORAL at 20:44

## 2020-05-26 RX ADMIN — RIFAXIMIN SCH MG: 550 TABLET ORAL at 20:44

## 2020-05-26 RX ADMIN — LACTULOSE SCH: 20 SOLUTION ORAL at 08:37

## 2020-05-26 RX ADMIN — CEFAZOLIN SCH MLS/HR: 330 INJECTION, POWDER, FOR SOLUTION INTRAMUSCULAR; INTRAVENOUS at 00:28

## 2020-05-26 RX ADMIN — LACTULOSE SCH: 20 SOLUTION ORAL at 20:40

## 2020-05-26 RX ADMIN — METRONIDAZOLE SCH MLS/HR: 500 INJECTION, SOLUTION INTRAVENOUS at 20:44

## 2020-05-26 RX ADMIN — PANTOPRAZOLE SODIUM SCH MG: 40 INJECTION, POWDER, FOR SOLUTION INTRAVENOUS at 08:42

## 2020-05-26 RX ADMIN — NOREPINEPHRINE BITARTRATE SCH: 1 INJECTION, SOLUTION, CONCENTRATE INTRAVENOUS at 19:43

## 2020-05-26 NOTE — P.PN
Subjective





Patient is seen in follow for acute kidney injury.  Started on hemodialysis on 

May 25.  Urine output 10-15 mL an hour.  Currently intubated on 30% FiO2.  She 

is maintained on normal saline at 50 mL an hour along with tube feeding.





Vital signs are stable.


General: The patient appeared well nourished and normally developed. 


HEENT: Head exam is unremarkable. Neck is without jugular venous distension.  

Intubated.


LUNGS: Lungs are clear to auscultation and percussion. Breath sounds decreased.


HEART: Rate and Rhythm are regular. 


ABDOMEN: Soft, nondistended.


EXTREMITITES: 1+ edema.





Objective





- Vital Signs


Vital signs: 


                                   Vital Signs











Temp  97.5 F L  05/26/20 08:00


 


Pulse  92   05/26/20 10:00


 


Resp  17   05/26/20 10:00


 


BP  114/75   05/26/20 08:30


 


Pulse Ox  100   05/26/20 10:00








                                 Intake & Output











 05/25/20 05/26/20 05/26/20





 18:59 06:59 18:59


 


Intake Total 4788.489 5631.782 388.099


 


Output Total 1091 145 339


 


Balance 66.200 1055.782 49.099


 


Weight 90.8 kg 92 kg 


 


Intake:   


 


   909 259


 


    0.9  20 


 


    Dextrose 5% in Water 1, 300  





    000 ml @ 75 mls/hr IV .   





    [id number] [facility] with Sodium   





    Bicarb (1 Meq/ml) 150 ml   





    Rx#:[id number]   


 


    Piperacillin-Tazobactam 3  100 





    .375 gm In Sodium   





    Chloride 0.9% 100 ml @ 25   





    mls/hr IVPB Q12H [facility] Rx#   





    [id number]   


 


    Sodium Chloride 0.9% 1, 400 650 150





    000 ml @ 50 mls/hr IV .   





    Q20H [facility] Rx#:[id number]   


 


    metroNIDAZOLE-NS   100 100





    mg In Saline 1 100ml.bag   





    @ 100 mls/hr IVPB TID [facility]   





    Rx#:[id number]   


 


    pressure bag 36 39 9


 


  Intake, IV Titration 181.200 41.782 69.099





  Amount   


 


    Insulin Regular 100 unit 10.235 10.858 1.7





    In Sodium Chloride 0.9%   





    100 ml @ Per Protocol IV   





    .Q0M [facility] Rx#:[id number]   


 


    Norepinephrine 32 mg In 13.761 15.397 





    Sodium Chloride 0.9% 218   





    ml @ 0.6 MCG/KG/MIN 23.   





    766 mls/hr IV .[id number]   





    [facility] Rx#:[id number]   


 


    Propofol 1,000 mg In 157.204 15.527 67.399





    Empty Bag 1 bag @ Titrate   





    IV .Q0M [facility] Rx#:   





    [id number]   


 


  Tube Feeding  160 30


 


  Other  90 30


 


Output:   


 


  Urine 91 145 39


 


  Stool   300


 


  Hemodialysis 1000  


 


Other:   


 


  Voiding Method Indwelling Catheter Indwelling Catheter Indwelling Catheter








                       ABP, PAP, CO, CI - Last Documented











Arterial Blood Pressure        131/57

















- Labs


CBC & Chem 7: 


                                 05/26/20 04:00





                                 05/26/20 04:00


Labs: 


                  Abnormal Lab Results - Last 24 Hours (Table)











  05/25/20 05/25/20 05/25/20 Range/Units





  12:09 13:19 13:25 


 


RBC     (3.80-5.40)  m/uL


 


Hgb     (11.4-16.0)  gm/dL


 


Hct     (34.0-46.0)  %


 


RDW     (11.5-15.5)  %


 


Plt Count     (150-450)  k/uL


 


ABG pH     (7.35-7.45)  


 


ABG pO2     ()  mmHg


 


ABG HCO3     (21-25)  mmol/L


 


ABG Total CO2     (19-24)  mmol/L


 


ABG O2 Saturation     (94-97)  %


 


Carbon Dioxide     (22-30)  mmol/L


 


Creatinine     (0.52-1.04)  mg/dL


 


Glucose     (74-99)  mg/dL


 


POC Glucose (mg/dL)  146 H  140 H   (75-99)  mg/dL


 


Plasma Lactic Acid Scott    4.5 H*  (0.7-2.0)  mmol/L


 


Calcium     (8.4-10.2)  mg/dL


 


Total Bilirubin     (0.2-1.3)  mg/dL


 


AST     (14-36)  U/L


 


ALT     (4-34)  U/L


 


Alkaline Phosphatase     ()  U/L


 


Total Protein     (6.3-8.2)  g/dL


 


Albumin     (3.5-5.0)  g/dL














  05/25/20 05/25/20 05/25/20 Range/Units





  15:32 16:21 18:13 


 


RBC     (3.80-5.40)  m/uL


 


Hgb     (11.4-16.0)  gm/dL


 


Hct     (34.0-46.0)  %


 


RDW     (11.5-15.5)  %


 


Plt Count     (150-450)  k/uL


 


ABG pH     (7.35-7.45)  


 


ABG pO2     ()  mmHg


 


ABG HCO3     (21-25)  mmol/L


 


ABG Total CO2     (19-24)  mmol/L


 


ABG O2 Saturation     (94-97)  %


 


Carbon Dioxide     (22-30)  mmol/L


 


Creatinine     (0.52-1.04)  mg/dL


 


Glucose     (74-99)  mg/dL


 


POC Glucose (mg/dL)  125 H  112 H  130 H  (75-99)  mg/dL


 


Plasma Lactic Acid Scott     (0.7-2.0)  mmol/L


 


Calcium     (8.4-10.2)  mg/dL


 


Total Bilirubin     (0.2-1.3)  mg/dL


 


AST     (14-36)  U/L


 


ALT     (4-34)  U/L


 


Alkaline Phosphatase     ()  U/L


 


Total Protein     (6.3-8.2)  g/dL


 


Albumin     (3.5-5.0)  g/dL














  05/25/20 05/25/20 05/25/20 Range/Units





  19:11 19:48 21:11 


 


RBC     (3.80-5.40)  m/uL


 


Hgb     (11.4-16.0)  gm/dL


 


Hct     (34.0-46.0)  %


 


RDW     (11.5-15.5)  %


 


Plt Count     (150-450)  k/uL


 


ABG pH     (7.35-7.45)  


 


ABG pO2     ()  mmHg


 


ABG HCO3     (21-25)  mmol/L


 


ABG Total CO2     (19-24)  mmol/L


 


ABG O2 Saturation     (94-97)  %


 


Carbon Dioxide     (22-30)  mmol/L


 


Creatinine     (0.52-1.04)  mg/dL


 


Glucose     (74-99)  mg/dL


 


POC Glucose (mg/dL)  134 H  144 H  125 H  (75-99)  mg/dL


 


Plasma Lactic Acid Scott     (0.7-2.0)  mmol/L


 


Calcium     (8.4-10.2)  mg/dL


 


Total Bilirubin     (0.2-1.3)  mg/dL


 


AST     (14-36)  U/L


 


ALT     (4-34)  U/L


 


Alkaline Phosphatase     ()  U/L


 


Total Protein     (6.3-8.2)  g/dL


 


Albumin     (3.5-5.0)  g/dL














  05/25/20 05/25/20 05/26/20 Range/Units





  22:03 23:00 00:12 


 


RBC     (3.80-5.40)  m/uL


 


Hgb     (11.4-16.0)  gm/dL


 


Hct     (34.0-46.0)  %


 


RDW     (11.5-15.5)  %


 


Plt Count     (150-450)  k/uL


 


ABG pH     (7.35-7.45)  


 


ABG pO2     ()  mmHg


 


ABG HCO3     (21-25)  mmol/L


 


ABG Total CO2     (19-24)  mmol/L


 


ABG O2 Saturation     (94-97)  %


 


Carbon Dioxide     (22-30)  mmol/L


 


Creatinine     (0.52-1.04)  mg/dL


 


Glucose     (74-99)  mg/dL


 


POC Glucose (mg/dL)  117 H  132 H  128 H  (75-99)  mg/dL


 


Plasma Lactic Acid Scott     (0.7-2.0)  mmol/L


 


Calcium     (8.4-10.2)  mg/dL


 


Total Bilirubin     (0.2-1.3)  mg/dL


 


AST     (14-36)  U/L


 


ALT     (4-34)  U/L


 


Alkaline Phosphatase     ()  U/L


 


Total Protein     (6.3-8.2)  g/dL


 


Albumin     (3.5-5.0)  g/dL














  05/26/20 05/26/20 05/26/20 Range/Units





  00:57 01:55 02:54 


 


RBC     (3.80-5.40)  m/uL


 


Hgb     (11.4-16.0)  gm/dL


 


Hct     (34.0-46.0)  %


 


RDW     (11.5-15.5)  %


 


Plt Count     (150-450)  k/uL


 


ABG pH     (7.35-7.45)  


 


ABG pO2     ()  mmHg


 


ABG HCO3     (21-25)  mmol/L


 


ABG Total CO2     (19-24)  mmol/L


 


ABG O2 Saturation     (94-97)  %


 


Carbon Dioxide     (22-30)  mmol/L


 


Creatinine     (0.52-1.04)  mg/dL


 


Glucose     (74-99)  mg/dL


 


POC Glucose (mg/dL)  124 H  144 H  127 H  (75-99)  mg/dL


 


Plasma Lactic Acid Scott     (0.7-2.0)  mmol/L


 


Calcium     (8.4-10.2)  mg/dL


 


Total Bilirubin     (0.2-1.3)  mg/dL


 


AST     (14-36)  U/L


 


ALT     (4-34)  U/L


 


Alkaline Phosphatase     ()  U/L


 


Total Protein     (6.3-8.2)  g/dL


 


Albumin     (3.5-5.0)  g/dL














  05/26/20 05/26/20 05/26/20 Range/Units





  03:58 04:00 04:00 


 


RBC   3.05 L   (3.80-5.40)  m/uL


 


Hgb   9.1 L   (11.4-16.0)  gm/dL


 


Hct   28.7 L   (34.0-46.0)  %


 


RDW   19.7 H   (11.5-15.5)  %


 


Plt Count   40 L   (150-450)  k/uL


 


ABG pH     (7.35-7.45)  


 


ABG pO2     ()  mmHg


 


ABG HCO3     (21-25)  mmol/L


 


ABG Total CO2     (19-24)  mmol/L


 


ABG O2 Saturation     (94-97)  %


 


Carbon Dioxide    32 H  (22-30)  mmol/L


 


Creatinine    3.34 H  (0.52-1.04)  mg/dL


 


Glucose    129 H  (74-99)  mg/dL


 


POC Glucose (mg/dL)  138 H    (75-99)  mg/dL


 


Plasma Lactic Acid Scott     (0.7-2.0)  mmol/L


 


Calcium    6.6 L  (8.4-10.2)  mg/dL


 


Total Bilirubin    2.3 H  (0.2-1.3)  mg/dL


 


AST    363 H  (14-36)  U/L


 


ALT    67 H  (4-34)  U/L


 


Alkaline Phosphatase    187 H  ()  U/L


 


Total Protein    5.0 L  (6.3-8.2)  g/dL


 


Albumin    2.2 L  (3.5-5.0)  g/dL














  05/26/20 05/26/20 05/26/20 Range/Units





  05:02 05:55 06:52 


 


RBC     (3.80-5.40)  m/uL


 


Hgb     (11.4-16.0)  gm/dL


 


Hct     (34.0-46.0)  %


 


RDW     (11.5-15.5)  %


 


Plt Count     (150-450)  k/uL


 


ABG pH  7.50 H    (7.35-7.45)  


 


ABG pO2  146 H    ()  mmHg


 


ABG HCO3  32 H    (21-25)  mmol/L


 


ABG Total CO2  33 H    (19-24)  mmol/L


 


ABG O2 Saturation  98.7 H    (94-97)  %


 


Carbon Dioxide     (22-30)  mmol/L


 


Creatinine     (0.52-1.04)  mg/dL


 


Glucose     (74-99)  mg/dL


 


POC Glucose (mg/dL)   167 H  140 H  (75-99)  mg/dL


 


Plasma Lactic Acid Scott     (0.7-2.0)  mmol/L


 


Calcium     (8.4-10.2)  mg/dL


 


Total Bilirubin     (0.2-1.3)  mg/dL


 


AST     (14-36)  U/L


 


ALT     (4-34)  U/L


 


Alkaline Phosphatase     ()  U/L


 


Total Protein     (6.3-8.2)  g/dL


 


Albumin     (3.5-5.0)  g/dL














  05/26/20 05/26/20 05/26/20 Range/Units





  08:33 10:21 11:39 


 


RBC     (3.80-5.40)  m/uL


 


Hgb     (11.4-16.0)  gm/dL


 


Hct     (34.0-46.0)  %


 


RDW     (11.5-15.5)  %


 


Plt Count     (150-450)  k/uL


 


ABG pH     (7.35-7.45)  


 


ABG pO2     ()  mmHg


 


ABG HCO3     (21-25)  mmol/L


 


ABG Total CO2     (19-24)  mmol/L


 


ABG O2 Saturation     (94-97)  %


 


Carbon Dioxide     (22-30)  mmol/L


 


Creatinine     (0.52-1.04)  mg/dL


 


Glucose     (74-99)  mg/dL


 


POC Glucose (mg/dL)  119 H  111 H  126 H  (75-99)  mg/dL


 


Plasma Lactic Acid Scott     (0.7-2.0)  mmol/L


 


Calcium     (8.4-10.2)  mg/dL


 


Total Bilirubin     (0.2-1.3)  mg/dL


 


AST     (14-36)  U/L


 


ALT     (4-34)  U/L


 


Alkaline Phosphatase     ()  U/L


 


Total Protein     (6.3-8.2)  g/dL


 


Albumin     (3.5-5.0)  g/dL








                      Microbiology - Last 24 Hours (Table)











 05/23/20 14:22 Urine Culture - Final





 Urine,Voided    Escherichia coli


 


 05/23/20 20:14 Gram Stain - Final





 Sputum Sputum Culture - Final


 


 05/25/20 15:10 Stool Culture - Preliminary





 Stool 


 


 05/23/20 15:36 Blood Culture - Preliminary





 Blood    No Growth after 48 hours














Assessment and Plan


Plan: 





Assessment:


1.  Acute kidney injury secondary to ATN secondary to septic shock.  Creatinine 

peaked at 4 this admission.  CAT scan revealed fullness of the collecting 

systems.  No hydronephrosis noted on renal ultrasound.  Unknown baseline renal 

function.  Started on hemodialysis on May 25.


2.  Hyperkalemia secondary to acute kidney injury, metabolic acidosis and GI 

bleed.  Improved with medical management.


3.  Severe metabolic acidosis secondary to acute kidney injury and lactic 

acidosis. Volatile screen negative.  Resolved.  Now alkalotic.


4.  Hypocalcemia secondary to acute kidney injury.  Corrected calcium near 8. 


5.  Acute GI bleed status post blood transfusion and IV DDAVP.  GI following.  

Hemoglobin better.


6.  UTI with urine culture positive for E. coli maintain on antibiotics.


7. ? Ischemic colitis.





Plan:


Maintain normal saline at 50 mL an hour.


Maintain tube feeds.


Lasix 80 mg IV once today.


Wean FiO2.


Follow-up cultures.


Currently seen while undergoing hemodialysis.  Third treatment tomorrow. Goal 1 

L UF.


Continue to monitor renal function and urine output closely.

## 2020-05-26 NOTE — PN
PROGRESS NOTE



PULMONARY/CRITICAL CARE PROGRESS NOTE:



DATE OF SERVICE:

05/26/2020



CRITICAL CARE TIME:  33 minutes.



This is a 49-year-old black female who had an in-hospital cardiac arrest.  She was

admitted to the hospital on 5/23.  The arrest occurred on the same day.  She is

apparently going for CAT scan or something of the sort.  Anyway, the patient was

intubated in the emergency room.  She has a history of respiratory failure, UTI,

sepsis.  Again, she was intubated on 5/23.  Currently on the volume assist-control

mode, rate of 16, tidal volume 400, FiO2 of 40%, which could be turned on the 30% PEEP

of 5.  Blood gases show pO2 of 146, pCO2 of 42 and a pH of 7.5.  The patient is

currently on saline at 50 mL an hour, Diprivan at 20 mcg/kg per minute, insulin at 2

units an hour, norepinephrine currently on hold.  She is on Vital high-protein at 20

with a goal of 58 mL an hour.  Head CT was negative for anything acute.  She has not

had any EEG or neurology consult.  I did tell the nurses that I wanted a daily

interruption of sedation on this patient.  Apparently when she had one earlier, she was

totally unresponsive.  She may have sustained significant brain injury from the cardiac

arrest.  The nurse was unaware of how long the resuscitation period was.



Current vital signs are temperature 97.5, heart rate 87, respiratory rate 16, blood

pressure 133/59 with a saturation of 99%.  Appears in no acute distress.  Currently

sedated and intubated.

HEENT: Examination is grossly unremarkable.  There is an orally placed endotracheal

tube and NG tube.

NECK:  Supple.  Full range of motion.  No adenopathy or thyromegaly.  Neck veins are

flat.

CARDIOVASCULAR: Examination reveals regular rhythm and rate.  Heart rate in the mid to

high 80s.  S1, S2 normal.

LUNGS:  Reveal a few scattered rhonchi.  No wheezes or crackles.

ABDOMEN:  Soft, bowel sounds are heard.

EXTREMITIES:  Intact.  No edema.

SKIN: Without rash.

NEUROLOGIC: Examination is difficult to assess given the fact that she is on sedation.



LABS:

Reviewed.  White count 9.4, hemoglobin 9.1, hematocrit 28.7, platelet count 40,000,

sodium 139, potassium 3.8, chloride 101, CO2 is 32, anion gap is 6, BUN and creatinine

15 and 3.34.  Calcium 6.6, bilirubin 2.3, , ALT is 67, alkaline phosphatase 187.



Current microbiology showing evidence of Escherichia coli in the urine from May 23.

Chest x-ray shows evidence of some patchy infiltrates, particularly on the right side

and to a lesser extent on the left side.  Endotracheal tube is in good position.



CURRENT MEDICATIONS:

Reviewed.  She is on chlorhexidine, hydrocortisone, insulin, lactulose, metronidazole,

Narcan, norepinephrine, which is currently on hold, Protonix, Zosyn, propofol, Xifaxan,

and vasopressin at 0.04 units/minute.



ASSESSMENT:

1. Acute hypoxemic respiratory failure, secondary to septic shock and possible

    ischemic colitis and/or a suspected intraabdominal source of sepsis.

2. Acute blood loss anemia.

3. Acute thrombocytopenia secondary to sepsis.

4. Shocked liver secondary to hypotension.

5. Mental status changes with suspected hepatic encephalopathy.

6. Acute kidney injury/acute tubular necrosis.

7. Severe anion gap metabolic acidosis, resolved.

8. Acute lactic acidosis.

9. Possible urinary tract infection secondary to Escherichia coli.

10.Acute alcohol intoxication.



PLAN:

The patient's medications will be reviewed and unnecessary medications will be stopped.

The patient will come off the Diprivan.  We will do a daily interruption of sedation.

Will assess whether or not she is ready for a spontaneous breathing trial.  Additional

recommendations and suggestions are forthcoming.  If she is off sedation and does not

respond appropriately, she will need a Neurology consult and an EEG.  Additional

recommendations and suggestions are forthcoming.  Her brain scan/CAT scan of the brain

was normal.



CRITICAL CARE TIME:  33 minutes.





MMODL / IJN: [id number] / Job#: [id number]

## 2020-05-26 NOTE — PN
PROGRESS NOTE



DATE OF SERVICE:

05/26/2020



Patient is a 49-year-old white female, remains on the vent, intubated, sedated in the

intensive care unit.  She has been off vasopressors for 24 hours, urine output

improving. She had about 300 mL of liquid stool through the _____.  No acute events

overnight as per the nursing staff.



PHYSICAL EXAMINATION:

VITAL SIGNS:  Blood pressure 131/57, pulse rate 92 and afebrile.

HEENT:  Examination unremarkable, conjunctivae are pink, sclerae nonicteric, oral

cavity no lesions.

CHEST:  Clear to auscultation.

HEART:  Regular rate and rhythm.

ABDOMEN:  Soft, nontender.  Bowel sounds are positive.  No organomegaly.

EXTREMITIES:  No pedal edema.

NEURO:  She is sedated.



LABS:

WBC 9.1, hemoglobin 9.1, platelets 40,000, T-bilirubin is 2.3, , ALT 67,

alkaline phosphatase 187.  Creatinine is 3.34.



IMPRESSION:

1. Alcoholic cirrhosis of the liver directed to alcoholic hepatitis.

2. Acute respiratory failure, remains intubated, sedated on the vent.

3. Lactic acidosis, improving.

4. Thrombocytopenia secondary to underlying alcoholic liver disease/alcoholic

    cirrhosis of the liver.

5. Hepatic encephalopathy on lactulose and Xifaxan.  Ammonia level is down to 23.

6. Anemia, gastrointestinal bleed, resolved.  Hemoglobin stable.



RECOMMENDATIONS:

1. Continue Protonix 40 mg daily.

2. Continue lactulose and Xifaxan.

3. Continue with tube feeds.

4. Management per intensivist.

5. Symptomatic supportive care.

6. No plans on any endoscopy intervention.

7. Will follow with you closely.

Thank you for this consultation.





MMODL / IJN: [id number] / Job#: [id number]

## 2020-05-26 NOTE — XR
EXAMINATION TYPE: XR chest 1V portable

 

DATE OF EXAM: 5/26/2020

 

COMPARISON: Prior chest x-ray 5/25/2020

 

HISTORY: Intubated

 

TECHNIQUE: Single frontal view of the chest is obtained.

 

FINDINGS:  Endotracheal tube and NG tube are overlying appropriate positions. There are overlying car
diac leads. No evident pneumothorax. Heart size is stable. Patchy basilar areas of increased attenuat
ion are again noted. Right hemidiaphragm is obscured.

 

IMPRESSION:  Correlate for pneumonia, possible associated effusion

## 2020-05-26 NOTE — PN
PROGRESS NOTE



DATE OF SERVICE:

05/26/2020



CHIEF COMPLAINT:

Cardiorespiratory arrest with renal failure.



HISTORY OF PRESENT ILLNESS:

This lady has been stable over the last 24 hours.  Vital signs are normal.  She

continues on the ventilator.



PHYSICAL EXAMINATION:

Pupils are mid range and dysconjugate. Breath sounds are heard bilaterally and there

are no rales or rhonchi.  Cardiac exam is normal. The abdomen is slightly protuberant

and soft.



IMPRESSION:

1. Cardiorespiratory arrest, etiology unknown.

2. Hypertension.

3. ? sepsis.

4. Acute renal failure.

5. Gastrointestinal blood loss anemia.



PLAN:

Continue with current supportive measures until she recovers enough that she can be

moved out of ICU.





MMODL / IJN: [id number] / Job#: [id number]

## 2020-05-27 LAB
ALBUMIN SERPL-MCNC: 2.2 G/DL (ref 3.5–5)
ALP SERPL-CCNC: 189 U/L (ref 38–126)
ALT SERPL-CCNC: 67 U/L (ref 4–34)
ANION GAP SERPL CALC-SCNC: 3 MMOL/L
AST SERPL-CCNC: 261 U/L (ref 14–36)
BASOPHILS # BLD AUTO: 0 K/UL (ref 0–0.2)
BASOPHILS NFR BLD AUTO: 0 %
BUN SERPL-SCNC: 13 MG/DL (ref 7–17)
CALCIUM SPEC-MCNC: 7 MG/DL (ref 8.4–10.2)
CHLORIDE SERPL-SCNC: 101 MMOL/L (ref 98–107)
CO2 BLDA-SCNC: 32 MMOL/L (ref 19–24)
CO2 BLDA-SCNC: 33 MMOL/L (ref 19–24)
CO2 SERPL-SCNC: 33 MMOL/L (ref 22–30)
EOSINOPHIL # BLD AUTO: 0.3 K/UL (ref 0–0.7)
EOSINOPHIL NFR BLD AUTO: 2 %
ERYTHROCYTE [DISTWIDTH] IN BLOOD BY AUTOMATED COUNT: 3.15 M/UL (ref 3.8–5.4)
ERYTHROCYTE [DISTWIDTH] IN BLOOD: 19.7 % (ref 11.5–15.5)
GLUCOSE BLD-MCNC: 102 MG/DL (ref 75–99)
GLUCOSE BLD-MCNC: 107 MG/DL (ref 75–99)
GLUCOSE BLD-MCNC: 116 MG/DL (ref 75–99)
GLUCOSE BLD-MCNC: 118 MG/DL (ref 75–99)
GLUCOSE BLD-MCNC: 136 MG/DL (ref 75–99)
GLUCOSE BLD-MCNC: 155 MG/DL (ref 75–99)
GLUCOSE BLD-MCNC: 155 MG/DL (ref 75–99)
GLUCOSE BLD-MCNC: 157 MG/DL (ref 75–99)
GLUCOSE BLD-MCNC: 165 MG/DL (ref 75–99)
GLUCOSE BLD-MCNC: 167 MG/DL (ref 75–99)
GLUCOSE BLD-MCNC: 169 MG/DL (ref 75–99)
GLUCOSE BLD-MCNC: 184 MG/DL (ref 75–99)
GLUCOSE BLD-MCNC: 184 MG/DL (ref 75–99)
GLUCOSE BLD-MCNC: 196 MG/DL (ref 75–99)
GLUCOSE BLD-MCNC: 98 MG/DL (ref 75–99)
GLUCOSE SERPL-MCNC: 160 MG/DL (ref 74–99)
HCO3 BLDA-SCNC: 31 MMOL/L (ref 21–25)
HCO3 BLDA-SCNC: 31 MMOL/L (ref 21–25)
HCT VFR BLD AUTO: 30.1 % (ref 34–46)
HGB BLD-MCNC: 9.7 GM/DL (ref 11.4–16)
LYMPHOCYTES # SPEC AUTO: 1.2 K/UL (ref 1–4.8)
LYMPHOCYTES NFR SPEC AUTO: 12 %
MCH RBC QN AUTO: 30.7 PG (ref 25–35)
MCHC RBC AUTO-ENTMCNC: 32.1 G/DL (ref 31–37)
MCV RBC AUTO: 95.4 FL (ref 80–100)
MONOCYTES # BLD AUTO: 0.5 K/UL (ref 0–1)
MONOCYTES NFR BLD AUTO: 5 %
NEUTROPHILS # BLD AUTO: 8.2 K/UL (ref 1.3–7.7)
NEUTROPHILS NFR BLD AUTO: 78 %
PCO2 BLDA: 41 MMHG (ref 35–45)
PCO2 BLDA: 42 MMHG (ref 35–45)
PH BLDA: 7.48 [PH] (ref 7.35–7.45)
PH BLDA: 7.5 [PH] (ref 7.35–7.45)
PLATELET # BLD AUTO: 39 K/UL (ref 150–450)
PO2 BLDA: 112 MMHG (ref 83–108)
PO2 BLDA: 77 MMHG (ref 83–108)
POTASSIUM SERPL-SCNC: 3.4 MMOL/L (ref 3.5–5.1)
PROT SERPL-MCNC: 5.1 G/DL (ref 6.3–8.2)
SODIUM SERPL-SCNC: 137 MMOL/L (ref 137–145)
WBC # BLD AUTO: 10.5 K/UL (ref 3.8–10.6)

## 2020-05-27 RX ADMIN — METRONIDAZOLE SCH MLS/HR: 500 INJECTION, SOLUTION INTRAVENOUS at 20:58

## 2020-05-27 RX ADMIN — NOREPINEPHRINE BITARTRATE SCH: 1 INJECTION, SOLUTION, CONCENTRATE INTRAVENOUS at 01:54

## 2020-05-27 RX ADMIN — POTASSIUM BICARBONATE SCH MEQ: 782 TABLET, EFFERVESCENT ORAL at 06:24

## 2020-05-27 RX ADMIN — ONDANSETRON PRN MG: 2 INJECTION INTRAMUSCULAR; INTRAVENOUS at 12:19

## 2020-05-27 RX ADMIN — LACTULOSE SCH: 20 SOLUTION ORAL at 20:55

## 2020-05-27 RX ADMIN — CEFAZOLIN SCH: 330 INJECTION, POWDER, FOR SOLUTION INTRAMUSCULAR; INTRAVENOUS at 20:32

## 2020-05-27 RX ADMIN — PANTOPRAZOLE SODIUM SCH MG: 40 INJECTION, POWDER, FOR SOLUTION INTRAVENOUS at 09:28

## 2020-05-27 RX ADMIN — DEXMEDETOMIDINE HYDROCHLORIDE SCH MLS/HR: 4 INJECTION, SOLUTION INTRAVENOUS at 16:39

## 2020-05-27 RX ADMIN — LACTULOSE SCH GM: 20 SOLUTION ORAL at 09:26

## 2020-05-27 RX ADMIN — PIPERACILLIN AND TAZOBACTAM SCH MLS/HR: 3; .375 INJECTION, POWDER, FOR SOLUTION INTRAVENOUS at 23:22

## 2020-05-27 RX ADMIN — RIFAXIMIN SCH MG: 550 TABLET ORAL at 09:28

## 2020-05-27 RX ADMIN — METRONIDAZOLE SCH MLS/HR: 500 INJECTION, SOLUTION INTRAVENOUS at 16:12

## 2020-05-27 RX ADMIN — POTASSIUM BICARBONATE SCH MEQ: 782 TABLET, EFFERVESCENT ORAL at 05:32

## 2020-05-27 RX ADMIN — NOREPINEPHRINE BITARTRATE SCH: 1 INJECTION, SOLUTION, CONCENTRATE INTRAVENOUS at 14:31

## 2020-05-27 RX ADMIN — PIPERACILLIN AND TAZOBACTAM SCH MLS/HR: 3; .375 INJECTION, POWDER, FOR SOLUTION INTRAVENOUS at 16:14

## 2020-05-27 RX ADMIN — CHLORHEXIDINE GLUCONATE SCH ML: 1.2 RINSE ORAL at 09:28

## 2020-05-27 RX ADMIN — CHLORHEXIDINE GLUCONATE SCH ML: 1.2 RINSE ORAL at 20:58

## 2020-05-27 RX ADMIN — METRONIDAZOLE SCH MLS/HR: 500 INJECTION, SOLUTION INTRAVENOUS at 09:28

## 2020-05-27 RX ADMIN — PIPERACILLIN AND TAZOBACTAM SCH MLS/HR: 3; .375 INJECTION, POWDER, FOR SOLUTION INTRAVENOUS at 05:32

## 2020-05-27 RX ADMIN — RIFAXIMIN SCH MG: 550 TABLET ORAL at 20:58

## 2020-05-27 NOTE — PN
PROGRESS NOTE



DATE OF SERVICE:

05/27/2020



REASON FOR FOLLOWUP:

Sepsis, ischemic colitis and possible aspiration pneumonitis.



INTERVAL HISTORY:

The patient is currently afebrile.  The patient is hemodynamically stable, not on

pressor support.  Undergoing dialysis.  No worsening diarrhea has been reported.  She

did have one episode of vomiting this morning per the nursing staff.  The patient is on

the vent, unable to provide any history.



PHYSICAL EXAMINATION:

Blood pressure 104/71 with a pulse of 97, temperature 97.8.  She is 99% on 30% FiO2.

General description is a middle-aged female intubated on the vent.

RESPIRATORY SYSTEM: Unlabored breathing. Clear to auscultation anteriorly.

HEART: S1, S2.  Regular rate and rhythm.

ABDOMEN: Soft. No tenderness. No guarding or rigidity.



LABS:

Hemoglobin 9.7, white count 10.5, BUN of 13, creatinine is 2.53.  Urine with E coli.

Stool culture pending.  Sputum is usual respiratory dioni.



DIAGNOSTIC IMPRESSION AND PLAN:

Patient admitted to hospital with sepsis with concern for possible ischemic _____ in

this patient who did have cardiac arrest and a question of pneumonitis. The patient is

currently covered with Zosyn; to continue and we will monitor her clinical course

closely.





MMODL / IJN: [id number] / Job#: [id number]

## 2020-05-27 NOTE — PN
PROGRESS NOTE



DATE OF SERVICE:

05/27/2020



CHIEF COMPLAINT:

Cardiac arrest.



HISTORY OF PRESENT ILLNESS:

This lady is becoming more responsive and is starting to wake up.  The plan is to

extubate her today.  She continues on dialysis.



PHYSICAL EXAMINATION:

Her vital signs are normal off of pressors.  She is slightly agitated at this time.

There are bilateral clear breath sounds on both sides.  Cardiac exam reveals her sinus

rhythm. Blood pressure is normal.  Abdomen is soft and nontender.



IMPRESSION:

1. Cardiorespiratory arrest.

2. Possible anoxic brain injury.

3. Acute tubular necrosis.



PLAN:

She will be extubated today and continue to be monitored in ICU while dialysis

continues.





MMODL / IJN: [id number] / Job#: [id number]

## 2020-05-27 NOTE — XR
EXAMINATION TYPE: XR chest 1V portable

 

DATE OF EXAM: 5/27/2020

 

CLINICAL HISTORY: Difficulty breathing progress study.  

 

TECHNIQUE: Single AP portable upright view of the chest is obtained.

 

COMPARISON: Chest x-ray from one day earlier and older studies.

 

FINDINGS:  Stable endotracheal and orogastric tubes. Increasing gas distention of stomach noted. Pers
istent right basilar opacity. New peripheral left mid to lower lung opacity. Upper lungs remain clear
 without pneumothorax. Cardiac silhouette size stable and enlarged. Background low lung volumes redem
onstrated. Osseous structures are intact.

 

IMPRESSION:

1. Low lung volumes and cardiomegaly with small right pleural effusion and associated right basilar a
telectasis and/or infiltrate are all redemonstrated and felt stable. New peripheral left midlung acut
e infiltrate, correlate clinically for developing Covid-19 pneumonia in current state.

2. Stable positioning of nasogastric tube with increasing gas distention of stomach noted. Correlate 
clinically.

 

A Yellow level critical message alert has been initiated for Alisha Ortiz MD~ via the [employer] Critical Results System on 5/27/2020 7:11 AM.  This message alert has been sent to Alisha Ortiz MD~ via the preferences provided by the clinician for the receipt of Radiology Critical Finding
s. Message ID [id number].

## 2020-05-27 NOTE — P.PN
Subjective


Progress Note Date: 05/27/20





Patient has significantly improved. Lactic acidosis is resolved. Abdomen is 

soft. Still intubated on Vent. Liquid brown stool 





Objective





- Vital Signs


Vital signs: 


                                   Vital Signs











Temp  97.6 F   05/27/20 04:00


 


Pulse  90   05/27/20 09:00


 


Resp  13   05/27/20 09:00


 


BP  111/69   05/27/20 09:00


 


Pulse Ox  100   05/27/20 09:00








                                 Intake & Output











 05/26/20 05/27/20 05/27/20





 18:59 06:59 18:59


 


Intake Total 8801.794 3694.580 345.784


 


Output Total 1695 185 132


 


Balance -895.040 3406.580 213.784


 


Weight  91.1 kg 


 


Intake:   


 


   936 159


 


    Piperacillin-Tazobactam 3  200 





    .375 gm In Sodium   





    Chloride 0.9% 100 ml @ 25   





    mls/hr IVPB Q12H PATR[facility] Rx#   





    :[id number]   


 


    Potassium Chloride 20 meq 100  





    In Water For Injection 1   





    100ml.bag @ 50 mls/hr   





    IVPB ONCE STA Rx#:   





    [id number]   


 


    Sodium Chloride 0.9% 1, 550 600 150





    000 ml @ 50 mls/hr IV .   





    Q20H PATR[facility] Rx#:[id number]   


 


    metroNIDAZOLE-NS  100 100 





    mg In Saline 1 100ml.bag   





    @ 100 mls/hr IVPB TID PATR[facility]   





    Rx#:[id number]   


 


    pressure bag 33 36 9


 


  Intake, IV Titration 69.099 21.580 6.784





  Amount   


 


    Insulin Regular 100 unit 1.7 21.580 6.784





    In Sodium Chloride 0.9%   





    100 ml @ Per Protocol IV   





    .Q0M PAT[facility] Rx#:[id number]   


 


    Propofol 1,000 mg In 67.399  





    Empty Bag 1 bag @ Titrate   





    IV .Q0M PATR[facility] Rx#:   





    [id number]   


 


  Tube Feeding 280 490 150


 


  Other 60 60 30


 


Output:   


 


  Urine 95 185 32


 


  Stool 600  100


 


  Hemodialysis 1000  


 


Other:   


 


  Voiding Method Indwelling Catheter Indwelling Catheter Indwelling Catheter


 


  # Voids 0  








                       ABP, PAP, CO, CI - Last Documented











Arterial Blood Pressure        128/68

















- Constitutional


General appearance: Present: cooperative





- Respiratory


Details: 


Intubated on vent





- Cardiovascular


Rhythm: regular





- Gastrointestinal


Gastrointestinal Comment(s): 





S/ND/NT





- Labs


CBC & Chem 7: 


                                 05/27/20 04:00





                                 05/27/20 09:21


Labs: 


                  Abnormal Lab Results - Last 24 Hours (Table)











  05/24/20 05/26/20 05/26/20 Range/Units





  02:35 11:39 12:39 


 


RBC     (3.80-5.40)  m/uL


 


Hgb     (11.4-16.0)  gm/dL


 


Hct     (34.0-46.0)  %


 


RDW     (11.5-15.5)  %


 


Plt Count     (150-450)  k/uL


 


Neutrophils #     (1.3-7.7)  k/uL


 


ABG pH     (7.35-7.45)  


 


ABG pO2     ()  mmHg


 


ABG HCO3     (21-25)  mmol/L


 


ABG Total CO2     (19-24)  mmol/L


 


ABG O2 Saturation     (94-97)  %


 


Potassium     (3.5-5.1)  mmol/L


 


Carbon Dioxide     (22-30)  mmol/L


 


Creatinine     (0.52-1.04)  mg/dL


 


Glucose     (74-99)  mg/dL


 


POC Glucose (mg/dL)   126 H  128 H  (75-99)  mg/dL


 


Calcium     (8.4-10.2)  mg/dL


 


Total Bilirubin     (0.2-1.3)  mg/dL


 


AST     (14-36)  U/L


 


ALT     (4-34)  U/L


 


Alkaline Phosphatase     ()  U/L


 


Total Protein     (6.3-8.2)  g/dL


 


Albumin     (3.5-5.0)  g/dL


 


Hep B Core Total Ab  Equivocal H    (Non-Reactive)  














  05/26/20 05/26/20 05/26/20 Range/Units





  14:24 15:10 16:57 


 


RBC     (3.80-5.40)  m/uL


 


Hgb     (11.4-16.0)  gm/dL


 


Hct     (34.0-46.0)  %


 


RDW     (11.5-15.5)  %


 


Plt Count     (150-450)  k/uL


 


Neutrophils #     (1.3-7.7)  k/uL


 


ABG pH     (7.35-7.45)  


 


ABG pO2     ()  mmHg


 


ABG HCO3     (21-25)  mmol/L


 


ABG Total CO2     (19-24)  mmol/L


 


ABG O2 Saturation     (94-97)  %


 


Potassium     (3.5-5.1)  mmol/L


 


Carbon Dioxide     (22-30)  mmol/L


 


Creatinine     (0.52-1.04)  mg/dL


 


Glucose     (74-99)  mg/dL


 


POC Glucose (mg/dL)  108 H  135 H  153 H  (75-99)  mg/dL


 


Calcium     (8.4-10.2)  mg/dL


 


Total Bilirubin     (0.2-1.3)  mg/dL


 


AST     (14-36)  U/L


 


ALT     (4-34)  U/L


 


Alkaline Phosphatase     ()  U/L


 


Total Protein     (6.3-8.2)  g/dL


 


Albumin     (3.5-5.0)  g/dL


 


Hep B Core Total Ab     (Non-Reactive)  














  05/26/20 05/26/20 05/26/20 Range/Units





  18:37 19:57 21:09 


 


RBC     (3.80-5.40)  m/uL


 


Hgb     (11.4-16.0)  gm/dL


 


Hct     (34.0-46.0)  %


 


RDW     (11.5-15.5)  %


 


Plt Count     (150-450)  k/uL


 


Neutrophils #     (1.3-7.7)  k/uL


 


ABG pH     (7.35-7.45)  


 


ABG pO2     ()  mmHg


 


ABG HCO3     (21-25)  mmol/L


 


ABG Total CO2     (19-24)  mmol/L


 


ABG O2 Saturation     (94-97)  %


 


Potassium     (3.5-5.1)  mmol/L


 


Carbon Dioxide     (22-30)  mmol/L


 


Creatinine     (0.52-1.04)  mg/dL


 


Glucose     (74-99)  mg/dL


 


POC Glucose (mg/dL)  159 H  160 H  157 H  (75-99)  mg/dL


 


Calcium     (8.4-10.2)  mg/dL


 


Total Bilirubin     (0.2-1.3)  mg/dL


 


AST     (14-36)  U/L


 


ALT     (4-34)  U/L


 


Alkaline Phosphatase     ()  U/L


 


Total Protein     (6.3-8.2)  g/dL


 


Albumin     (3.5-5.0)  g/dL


 


Hep B Core Total Ab     (Non-Reactive)  














  05/26/20 05/26/20 05/26/20 Range/Units





  22:03 22:59 23:53 


 


RBC     (3.80-5.40)  m/uL


 


Hgb     (11.4-16.0)  gm/dL


 


Hct     (34.0-46.0)  %


 


RDW     (11.5-15.5)  %


 


Plt Count     (150-450)  k/uL


 


Neutrophils #     (1.3-7.7)  k/uL


 


ABG pH     (7.35-7.45)  


 


ABG pO2     ()  mmHg


 


ABG HCO3     (21-25)  mmol/L


 


ABG Total CO2     (19-24)  mmol/L


 


ABG O2 Saturation     (94-97)  %


 


Potassium     (3.5-5.1)  mmol/L


 


Carbon Dioxide     (22-30)  mmol/L


 


Creatinine     (0.52-1.04)  mg/dL


 


Glucose     (74-99)  mg/dL


 


POC Glucose (mg/dL)  204 H  162 H  177 H  (75-99)  mg/dL


 


Calcium     (8.4-10.2)  mg/dL


 


Total Bilirubin     (0.2-1.3)  mg/dL


 


AST     (14-36)  U/L


 


ALT     (4-34)  U/L


 


Alkaline Phosphatase     ()  U/L


 


Total Protein     (6.3-8.2)  g/dL


 


Albumin     (3.5-5.0)  g/dL


 


Hep B Core Total Ab     (Non-Reactive)  














  05/27/20 05/27/20 05/27/20 Range/Units





  00:54 01:54 03:02 


 


RBC     (3.80-5.40)  m/uL


 


Hgb     (11.4-16.0)  gm/dL


 


Hct     (34.0-46.0)  %


 


RDW     (11.5-15.5)  %


 


Plt Count     (150-450)  k/uL


 


Neutrophils #     (1.3-7.7)  k/uL


 


ABG pH     (7.35-7.45)  


 


ABG pO2     ()  mmHg


 


ABG HCO3     (21-25)  mmol/L


 


ABG Total CO2     (19-24)  mmol/L


 


ABG O2 Saturation     (94-97)  %


 


Potassium     (3.5-5.1)  mmol/L


 


Carbon Dioxide     (22-30)  mmol/L


 


Creatinine     (0.52-1.04)  mg/dL


 


Glucose     (74-99)  mg/dL


 


POC Glucose (mg/dL)  196 H  184 H  167 H  (75-99)  mg/dL


 


Calcium     (8.4-10.2)  mg/dL


 


Total Bilirubin     (0.2-1.3)  mg/dL


 


AST     (14-36)  U/L


 


ALT     (4-34)  U/L


 


Alkaline Phosphatase     ()  U/L


 


Total Protein     (6.3-8.2)  g/dL


 


Albumin     (3.5-5.0)  g/dL


 


Hep B Core Total Ab     (Non-Reactive)  














  05/27/20 05/27/20 05/27/20 Range/Units





  04:00 04:00 04:00 


 


RBC  3.15 L    (3.80-5.40)  m/uL


 


Hgb  9.7 L    (11.4-16.0)  gm/dL


 


Hct  30.1 L    (34.0-46.0)  %


 


RDW  19.7 H    (11.5-15.5)  %


 


Plt Count  39 L    (150-450)  k/uL


 


Neutrophils #  8.2 H    (1.3-7.7)  k/uL


 


ABG pH     (7.35-7.45)  


 


ABG pO2     ()  mmHg


 


ABG HCO3     (21-25)  mmol/L


 


ABG Total CO2     (19-24)  mmol/L


 


ABG O2 Saturation     (94-97)  %


 


Potassium   3.4 L   (3.5-5.1)  mmol/L


 


Carbon Dioxide   33 H   (22-30)  mmol/L


 


Creatinine   2.53 H   (0.52-1.04)  mg/dL


 


Glucose   160 H   (74-99)  mg/dL


 


POC Glucose (mg/dL)    169 H  (75-99)  mg/dL


 


Calcium   7.0 L   (8.4-10.2)  mg/dL


 


Total Bilirubin   2.0 H   (0.2-1.3)  mg/dL


 


AST   261 H   (14-36)  U/L


 


ALT   67 H   (4-34)  U/L


 


Alkaline Phosphatase   189 H   ()  U/L


 


Total Protein   5.1 L   (6.3-8.2)  g/dL


 


Albumin   2.2 L   (3.5-5.0)  g/dL


 


Hep B Core Total Ab     (Non-Reactive)  














  05/27/20 05/27/20 05/27/20 Range/Units





  04:54 05:06 05:56 


 


RBC     (3.80-5.40)  m/uL


 


Hgb     (11.4-16.0)  gm/dL


 


Hct     (34.0-46.0)  %


 


RDW     (11.5-15.5)  %


 


Plt Count     (150-450)  k/uL


 


Neutrophils #     (1.3-7.7)  k/uL


 


ABG pH   7.48 H   (7.35-7.45)  


 


ABG pO2   112 H   ()  mmHg


 


ABG HCO3   31 H   (21-25)  mmol/L


 


ABG Total CO2   32 H   (19-24)  mmol/L


 


ABG O2 Saturation   98.1 H   (94-97)  %


 


Potassium     (3.5-5.1)  mmol/L


 


Carbon Dioxide     (22-30)  mmol/L


 


Creatinine     (0.52-1.04)  mg/dL


 


Glucose     (74-99)  mg/dL


 


POC Glucose (mg/dL)  155 H   184 H  (75-99)  mg/dL


 


Calcium     (8.4-10.2)  mg/dL


 


Total Bilirubin     (0.2-1.3)  mg/dL


 


AST     (14-36)  U/L


 


ALT     (4-34)  U/L


 


Alkaline Phosphatase     ()  U/L


 


Total Protein     (6.3-8.2)  g/dL


 


Albumin     (3.5-5.0)  g/dL


 


Hep B Core Total Ab     (Non-Reactive)  














  05/27/20 05/27/20 05/27/20 Range/Units





  06:59 09:14 09:38 


 


RBC     (3.80-5.40)  m/uL


 


Hgb     (11.4-16.0)  gm/dL


 


Hct     (34.0-46.0)  %


 


RDW     (11.5-15.5)  %


 


Plt Count     (150-450)  k/uL


 


Neutrophils #     (1.3-7.7)  k/uL


 


ABG pH    7.50 H  (7.35-7.45)  


 


ABG pO2    77 L  ()  mmHg


 


ABG HCO3    31 H  (21-25)  mmol/L


 


ABG Total CO2    33 H  (19-24)  mmol/L


 


ABG O2 Saturation     (94-97)  %


 


Potassium     (3.5-5.1)  mmol/L


 


Carbon Dioxide     (22-30)  mmol/L


 


Creatinine     (0.52-1.04)  mg/dL


 


Glucose     (74-99)  mg/dL


 


POC Glucose (mg/dL)  157 H  136 H   (75-99)  mg/dL


 


Calcium     (8.4-10.2)  mg/dL


 


Total Bilirubin     (0.2-1.3)  mg/dL


 


AST     (14-36)  U/L


 


ALT     (4-34)  U/L


 


Alkaline Phosphatase     ()  U/L


 


Total Protein     (6.3-8.2)  g/dL


 


Albumin     (3.5-5.0)  g/dL


 


Hep B Core Total Ab     (Non-Reactive)  








                      Microbiology - Last 24 Hours (Table)











 05/23/20 15:36 Blood Culture - Preliminary





 Blood    No Growth after 72 hours


 


 05/23/20 14:22 Urine Culture - Final





 Urine,Voided    Escherichia coli


 


 05/23/20 20:14 Gram Stain - Final





 Sputum Sputum Culture - Final














Assessment and Plan


Assessment: 


VDRF, Colitis, likely septic shock, UTI, cirrhosis, JACE  


Plan: 


Patient has resolved lactic acidosis, abdomen is soft and nontender today. 

Liquid brown stool. She is off pressors and tolerating tube feeds at this time. 

There is no surgical management recommended. Based on overall clinical picture I

do not believe patient has ischemic colitis that requires surgical intervention.

Please contact me directly with any further concerns or questions regarding 

patient.

## 2020-05-27 NOTE — PN
PROGRESS NOTE



DATE OF SERVICE:

05/26/2020



REASON FOR FOLLOWUP:

Sepsis possible ischemic colitis.



INTERVAL HISTORY:

The patient is currently afebrile.  The patient is hemodynamically stable.  FiO2 is

currently at 30%.  No significant purulent secretion through the ET has been reported

by the nursing staff and the diarrhea seemed to have slightly slowed.



PHYSICAL EXAMINATION:

Blood pressure 149/70 with a pulse of 76, temperature 98. She is 100% on 30% FiO2.

General description is a middle-aged female intubated on the vent.

RESPIRATORY SYSTEM: Unlabored breathing, clear to auscultation anteriorly.  HEART: S1,

S2.  Regular rate and rhythm.

ABDOMEN: Soft, no tenderness.



LABS:

Hemoglobin 9.1, white count 9.4, BUN of 15, creatinine 3.34.



DIAGNOSTIC IMPRESSION AND PLAN:

Patient with sepsis, source is likely multifactorial in this patient who did have a

possible ischemic colitis and did have cardiac arrest. So far culture has been negative

for resistant pathogen. Urinary tract infection shows Escherichia coli  which is

sensitive pathogen.  Patient is covered with Zosyn to continue and will monitor

clinical course closely.





MMODL / IJN: [id number] / Job#: [id number]

## 2020-05-27 NOTE — P.CNNES
History of Present Illness


Consult date: 05/27/20


Reason for Consult: Poor response to removal off sedation


History of Present Illness: 





This is a new neurology consult requested for further advice and recommendations

for a 49-year-old female who presented the ER on May 23.  She apparently had an 

alcohol level of 274, went on to take her scheduled dose of trazodone that 

evening 150 mg.  Patient was found unresponsive and required 2 rounds of CPR was

PEA in the ER.  Her UA was positive for E. coli.  The CT head on admission was 

negative for any acute intracranial pathology.





Neurology has been requested for further advice and recommendations for her lack

of responsiveness with sedation removal.  Yesterday at 9:30 AM she was taken off

sedation for approximately 24 hours this morning she was able to follow a few 

simple commands she was able to nod her head,  open her eyes and moved her feet 

spontaneously.  She is not currently breathing on her own over the ventilator.  

Currently she is now only on Precedex 0.3 mcg/h.





Review of her labs indicates that her creatinine has been elevated and she is 

requiring hemodialysis.





On the evaluation tonight the patient was taken off Precedex at 8:50 PM she is 

been off Precedex for an hour with still very minimal response to tactile 

stimulation.  She has some minimum grimacing, cough reflex is strong but no 

overt spontaneous movements or eye opening at this time.  FOUR SCORE SCALE (8)





Neurology will reevaluate tomorrow and with the team discuss whether or not we 

need to move forward with an EEG or further neuroimaging studies.





Thank you for this consultation neurology will follow closely and reevaluate in 

the morning.





Tiesha Ramos M.D.


Board Certified in Neurology and Sleep Medicine





Past Medical History


Past Medical History: Unable to Obtain


Additional Past Medical History / Comment(s): breast cancer, drug abuse, suicide

attempt


History of Any Multi-Drug Resistant Organisms: Unobtainable


Past Surgical History: Unable to Obtain


Additional Past Surgical History / Comment(s): implantable cardiac monitor


Past Anesthesia/Blood Transfusion Reactions: No Reported Reaction


Past Psychological History: Unable to Obtain


Smoking Status: Unknown if ever smoked


Past Alcohol Use History: Unable to Obtain


Past Drug Use History: Unable to Obtain





Medications and Allergies


                                Home Medications











 Medication  Instructions  Recorded  Confirmed  Type


 


Acetaminophen [Tylenol 8 Hour] 650 mg PO DAILY 05/26/20 05/26/20 History


 


Citalopram Hydrobromide 20 mg PO DAILY PRN 05/26/20 05/26/20 History





[Citalopram HBr]    


 


Famotidine 20 mg PO HS 05/26/20 05/26/20 History


 


Ferrous Sulfate [Feosol] 325 mg PO DAILY 05/26/20 05/26/20 History


 


Lisinopril [Prinivil] 10 mg PO DAILY 05/26/20 05/26/20 History


 


Loratadine 10 mg PO DAILY 05/26/20 05/26/20 History


 


Metoprolol Tartrate [Lopressor] 50 mg PO BID 05/26/20 05/26/20 History


 


Omeprazole 20 mg PO DAILY 05/26/20 05/26/20 History


 


risperiDONE [RisperDAL] 4 mg PO HS 05/26/20 05/26/20 History


 


traZODone  mg PO DAILY 05/26/20 05/26/20 History








                                    Allergies











Allergy/AdvReac Type Severity Reaction Status Date / Time


 


No Known Allergies Allergy   Verified 05/26/20 19:45














Physical Examination





- Vital Signs


Vital Signs: 


                                   Vital Signs











  Temp Pulse Pulse Resp BP BP Pulse Ox


 


 05/27/20 21:00   61   21  102/62   98


 


 05/27/20 20:45   65   22  102/62   98


 


 05/27/20 20:30   65   22  102/62   98


 


 05/27/20 20:15   64   16  102/62   98


 


 05/27/20 20:00  97.6 F  66   16  107/64   98


 


 05/27/20 19:58  97.9 F   103 H    126/58 


 


 05/27/20 19:45   66   16  107/64   98


 


 05/27/20 19:30   64   20  107/64   98


 


 05/27/20 19:15   57 L   16    100


 


 05/27/20 19:00   70   16    97


 


 05/27/20 18:45   70   16    97


 


 05/27/20 18:30   72   16    98


 


 05/27/20 18:15   69   16    98


 


 05/27/20 18:00   64   16    99


 


 05/27/20 17:45   76   16    97


 


 05/27/20 17:30   81   16    97


 


 05/27/20 17:15   89   16    97


 


 05/27/20 17:00   87   16    99


 


 05/27/20 16:00  98 F  101 H   19  105/92   98


 


 05/27/20 15:00   97   20  104/71   99


 


 05/27/20 14:00   100   16  107/56   99


 


 05/27/20 13:00   105 H   15  107/71   96


 


 05/27/20 12:00  97.8 F  101 H   15    97


 


 05/27/20 11:55     15    96


 


 05/27/20 11:00   90   10 L  106/68   95


 


 05/27/20 10:00   89   13    95


 


 05/27/20 09:00   90   13  111/69   100


 


 05/27/20 08:00   85   16  104/66   99


 


 05/27/20 07:50     13   


 


 05/27/20 07:45        97


 


 05/27/20 07:00   94   17  110/75   99


 


 05/27/20 06:00   90   19  114/82   100


 


 05/27/20 05:00   81   16  117/83   100


 


 05/27/20 04:00  97.6 F  83   16  120/83   100


 


 05/27/20 03:00   82   16  115/80   99


 


 05/27/20 02:00   84   16  123/81   100


 


 05/27/20 01:00   87   16  123/89   100


 


 05/27/20 00:14   79   21  123/89   99


 


 05/27/20 00:00  97.6 F  76   16  121/85   100


 


 05/26/20 23:00   72   16  122/80   100


 


 05/26/20 22:00   82   16  113/71   99








                                Intake and Output











 05/27/20 05/27/20 05/27/20





 06:59 14:59 22:59


 


Intake Total 913.762 714.989 643.334


 


Output Total 


 


Balance 773.762 17.989 -1526.666


 


Intake:   


 


   264 641


 


    Piperacillin-Tazobactam 3 100  100





    .375 gm In Sodium   





    Chloride 0.9% 100 ml @ 25   





    mls/hr IVPB Q12H PATRICIA Rx#   





    :[id number]   


 


    Sodium Chloride 0.9% 1, 400 240 320





    000 ml @ 10 mls/hr IV .   





    Q24H PATRICIA Rx#:[id number]   


 


    metroNIDAZOLE-NS    200





    mg In Saline 1 100ml.bag   





    @ 100 mls/hr IVPB TID PATRICIA   





    Rx#:[id number]   


 


    pressure bag 24 24 21


 


  Intake, IV Titration 19.762 8.989 2.334





  Amount   


 


    Insulin Regular 100 unit 19.762 8.989 





    In Sodium Chloride 0.9%   





    100 ml @ Per Protocol IV   





    .Q0M PATRICIA Rx#:[id number]   


 


    Norepinephrine 32 mg In   2.334





    Sodium Chloride 0.9% 218   





    ml @ 0.02 MCG/KG/MIN 0.   





    854 mls/hr IV .Q24H PATRICIA   





    Rx#:[id number]   


 


  Tube Feeding 340 382 0


 


  Other 30 60 


 


Output:   


 


  Urine 140 97 70


 


  Stool  300 


 


  Emesis  300 100


 


  Hemodialysis   2000


 


Other:   


 


  Voiding Method Indwelling Catheter Indwelling Catheter Indwelling Catheter


 


  # Voids   0


 


  Weight 91.1 kg 91.1 kg 








                         ABP, PAP, CO, CI - Last 8 Hours











Arterial Blood Pressure        116/63


 


Arterial Blood Pressure        115/62


 


Arterial Blood Pressure        112/60


 


Arterial Blood Pressure        112/61


 


Arterial Blood Pressure        113/59


 


Arterial Blood Pressure        114/59


 


Arterial Blood Pressure        127/68


 


Arterial Blood Pressure        106/56


 


Arterial Blood Pressure        108/55


 


Arterial Blood Pressure        119/60


 


Arterial Blood Pressure        127/64


 


Arterial Blood Pressure        119/61


 


Arterial Blood Pressure        77/37


 


Arterial Blood Pressure        72/35


 


Arterial Blood Pressure        88/40


 


Arterial Blood Pressure        123/47


 


Arterial Blood Pressure        120/51


 


Arterial Blood Pressure        127/62


 


Arterial Blood Pressure        105/64

















Results





- Laboratory Findings


CBC and BMP: 


                                 05/27/20 04:00





                                 05/27/20 09:21


Abnormal Lab Findings: 


                                  Abnormal Labs











  05/23/20 05/23/20 05/23/20





  08:18 08:18 12:14


 


RBC   


 


Hgb   


 


Hct   


 


MCV   


 


MCHC   


 


RDW   


 


Plt Count   


 


Neutrophils #   


 


Lymphocytes # (Manual)   


 


Metamyelocytes # (Man)   


 


Nucleated RBCs   


 


Macrocytosis   


 


PT   


 


INR   


 


APTT   


 


ABG pH   


 


ABG pCO2   


 


ABG pO2   


 


ABG HCO3   


 


ABG Total CO2   


 


ABG O2 Saturation   


 


ABG Lactic Acid   


 


VBG pH   


 


VBG pCO2   


 


VBG HCO3   


 


Potassium   


 


Chloride   


 


Carbon Dioxide   


 


Creatinine   


 


Glucose   


 


POC Glucose (mg/dL)    174 H


 


Osmolality   382 H* 


 


Plasma Lactic Acid Scott   


 


Calcium   


 


Total Bilirubin   


 


AST   


 


ALT   


 


Alkaline Phosphatase   


 


Ammonia   


 


Creatine Kinase   


 


Total Protein   


 


Albumin   


 


Urine Appearance   


 


Urine Protein   


 


Urine Glucose (UA)   


 


Urine Blood   


 


Ur Leukocyte Esterase   


 


Urine RBC   


 


Urine WBC   


 


Urine WBC Clumps   


 


Amorphous Sediment   


 


Urine Bacteria   


 


Hyaline Casts   


 


Urine Mucus   


 


Serum Alcohol   


 


Ethyl Alcohol Screen  191 H  


 


Hep B Core Total Ab   


 


Crossmatch   














  05/23/20 05/23/20 05/23/20





  12:15 12:15 12:15


 


RBC   2.05 L 


 


Hgb   6.3 L* 


 


Hct   23.7 L 


 


MCV   115.7 H 


 


MCHC   26.5 L 


 


RDW   19.9 H 


 


Plt Count   48 L 


 


Neutrophils #   


 


Lymphocytes # (Manual)   


 


Metamyelocytes # (Man)   


 


Nucleated RBCs   


 


Macrocytosis   Marked A 


 


PT    16.9 H


 


INR    1.7 H


 


APTT    42.3 H


 


ABG pH   


 


ABG pCO2   


 


ABG pO2   


 


ABG HCO3   


 


ABG Total CO2   


 


ABG O2 Saturation   


 


ABG Lactic Acid   


 


VBG pH  6.84 L*  


 


VBG pCO2  25 L  


 


VBG HCO3  4 L*  


 


Potassium   


 


Chloride   


 


Carbon Dioxide   


 


Creatinine   


 


Glucose   


 


POC Glucose (mg/dL)   


 


Osmolality   


 


Plasma Lactic Acid Scott   


 


Calcium   


 


Total Bilirubin   


 


AST   


 


ALT   


 


Alkaline Phosphatase   


 


Ammonia   


 


Creatine Kinase   


 


Total Protein   


 


Albumin   


 


Urine Appearance   


 


Urine Protein   


 


Urine Glucose (UA)   


 


Urine Blood   


 


Ur Leukocyte Esterase   


 


Urine RBC   


 


Urine WBC   


 


Urine WBC Clumps   


 


Amorphous Sediment   


 


Urine Bacteria   


 


Hyaline Casts   


 


Urine Mucus   


 


Serum Alcohol   


 


Ethyl Alcohol Screen   


 


Hep B Core Total Ab   


 


Crossmatch   














  05/23/20 05/23/20 05/23/20





  12:15 12:15 12:15


 


RBC   


 


Hgb   


 


Hct   


 


MCV   


 


MCHC   


 


RDW   


 


Plt Count   


 


Neutrophils #   


 


Lymphocytes # (Manual)   


 


Metamyelocytes # (Man)   


 


Nucleated RBCs   


 


Macrocytosis   


 


PT   


 


INR   


 


APTT   


 


ABG pH   


 


ABG pCO2   


 


ABG pO2   


 


ABG HCO3   


 


ABG Total CO2   


 


ABG O2 Saturation   


 


ABG Lactic Acid   


 


VBG pH   


 


VBG pCO2   


 


VBG HCO3   


 


Potassium  6.6 H*  


 


Chloride  111 H  


 


Carbon Dioxide  <5 L*  


 


Creatinine  3.55 H  


 


Glucose  167 H  


 


POC Glucose (mg/dL)   


 


Osmolality   


 


Plasma Lactic Acid Scott   20.4 H* 


 


Calcium  7.5 L  


 


Total Bilirubin   


 


AST  178 H  


 


ALT  43 H  


 


Alkaline Phosphatase  215 H  


 


Ammonia   199 H 


 


Creatine Kinase  374 H  


 


Total Protein  5.3 L  


 


Albumin  2.5 L  


 


Urine Appearance   


 


Urine Protein   


 


Urine Glucose (UA)   


 


Urine Blood   


 


Ur Leukocyte Esterase   


 


Urine RBC   


 


Urine WBC   


 


Urine WBC Clumps   


 


Amorphous Sediment   


 


Urine Bacteria   


 


Hyaline Casts   


 


Urine Mucus   


 


Serum Alcohol  274 H*  


 


Ethyl Alcohol Screen   


 


Hep B Core Total Ab   


 


Crossmatch    See Detail














  05/23/20 05/23/20 05/23/20





  13:25 14:22 14:29


 


RBC   


 


Hgb   


 


Hct   


 


MCV   


 


MCHC   


 


RDW   


 


Plt Count   


 


Neutrophils #   


 


Lymphocytes # (Manual)   


 


Metamyelocytes # (Man)   


 


Nucleated RBCs   


 


Macrocytosis   


 


PT   


 


INR   


 


APTT   


 


ABG pH  6.81 L*  


 


ABG pCO2  22 L  


 


ABG pO2  >400 H  


 


ABG HCO3  4 L*  


 


ABG Total CO2  4 L  


 


ABG O2 Saturation  99.3 H  


 


ABG Lactic Acid   


 


VBG pH   


 


VBG pCO2   


 


VBG HCO3   


 


Potassium   


 


Chloride   


 


Carbon Dioxide   


 


Creatinine   


 


Glucose   


 


POC Glucose (mg/dL)    210 H


 


Osmolality   


 


Plasma Lactic Acid Scott   


 


Calcium   


 


Total Bilirubin   


 


AST   


 


ALT   


 


Alkaline Phosphatase   


 


Ammonia   


 


Creatine Kinase   


 


Total Protein   


 


Albumin   


 


Urine Appearance   Turbid H 


 


Urine Protein   2+ H 


 


Urine Glucose (UA)   Trace H 


 


Urine Blood   Moderate H 


 


Ur Leukocyte Esterase   Large H 


 


Urine RBC   82 H 


 


Urine WBC   >182 H 


 


Urine WBC Clumps   Many H 


 


Amorphous Sediment   Rare H 


 


Urine Bacteria   Many H 


 


Hyaline Casts   73 H 


 


Urine Mucus   Many H 


 


Serum Alcohol   


 


Ethyl Alcohol Screen   


 


Hep B Core Total Ab   


 


Crossmatch   














  05/23/20 05/23/20 05/23/20





  16:26 17:57 20:00


 


RBC   


 


Hgb   


 


Hct   


 


MCV   


 


MCHC   


 


RDW   


 


Plt Count   


 


Neutrophils #   


 


Lymphocytes # (Manual)   


 


Metamyelocytes # (Man)   


 


Nucleated RBCs   


 


Macrocytosis   


 


PT   


 


INR   


 


APTT   


 


ABG pH  6.89 L*  


 


ABG pCO2  19 L*  


 


ABG pO2  165 H  


 


ABG HCO3  4 L*  


 


ABG Total CO2  4 L  


 


ABG O2 Saturation  98.1 H  


 


ABG Lactic Acid   


 


VBG pH   


 


VBG pCO2   


 


VBG HCO3   


 


Potassium   


 


Chloride   


 


Carbon Dioxide   


 


Creatinine   


 


Glucose   


 


POC Glucose (mg/dL)   135 H 


 


Osmolality   


 


Plasma Lactic Acid Scott    18.1 H*


 


Calcium   


 


Total Bilirubin   


 


AST   


 


ALT   


 


Alkaline Phosphatase   


 


Ammonia   


 


Creatine Kinase   


 


Total Protein   


 


Albumin   


 


Urine Appearance   


 


Urine Protein   


 


Urine Glucose (UA)   


 


Urine Blood   


 


Ur Leukocyte Esterase   


 


Urine RBC   


 


Urine WBC   


 


Urine WBC Clumps   


 


Amorphous Sediment   


 


Urine Bacteria   


 


Hyaline Casts   


 


Urine Mucus   


 


Serum Alcohol   


 


Ethyl Alcohol Screen   


 


Hep B Core Total Ab   


 


Crossmatch   














  05/23/20 05/23/20 05/23/20





  20:46 22:25 22:25


 


RBC   2.82 L 


 


Hgb   8.5 L D 


 


Hct   28.8 L 


 


MCV   102.2 H D 


 


MCHC   29.5 L 


 


RDW   19.9 H 


 


Plt Count   81 L D 


 


Neutrophils #   


 


Lymphocytes # (Manual)   


 


Metamyelocytes # (Man)   


 


Nucleated RBCs   


 


Macrocytosis   


 


PT   


 


INR   


 


APTT   


 


ABG pH  7.12 L*  


 


ABG pCO2  22 L  


 


ABG pO2  122 H  


 


ABG HCO3  7 L*  


 


ABG Total CO2  8 L  


 


ABG O2 Saturation  98.1 H  


 


ABG Lactic Acid   


 


VBG pH   


 


VBG pCO2   


 


VBG HCO3   


 


Potassium    5.4 H


 


Chloride    109 H


 


Carbon Dioxide    7 L*


 


Creatinine    3.03 H


 


Glucose    174 H


 


POC Glucose (mg/dL)   


 


Osmolality   


 


Plasma Lactic Acid Scott   


 


Calcium    6.6 L


 


Total Bilirubin    1.4 H


 


AST    289 H


 


ALT    53 H


 


Alkaline Phosphatase    241 H


 


Ammonia   


 


Creatine Kinase   


 


Total Protein    6.1 L


 


Albumin    3.0 L


 


Urine Appearance   


 


Urine Protein   


 


Urine Glucose (UA)   


 


Urine Blood   


 


Ur Leukocyte Esterase   


 


Urine RBC   


 


Urine WBC   


 


Urine WBC Clumps   


 


Amorphous Sediment   


 


Urine Bacteria   


 


Hyaline Casts   


 


Urine Mucus   


 


Serum Alcohol   


 


Ethyl Alcohol Screen   


 


Hep B Core Total Ab   


 


Crossmatch   














  05/23/20 05/23/20 05/23/20





  22:25 22:26 22:50


 


RBC   


 


Hgb   


 


Hct   


 


MCV   


 


MCHC   


 


RDW   


 


Plt Count   


 


Neutrophils #   


 


Lymphocytes # (Manual)   


 


Metamyelocytes # (Man)   


 


Nucleated RBCs   


 


Macrocytosis   


 


PT  14.5 H  


 


INR  1.5 H  


 


APTT  30.6 H  


 


ABG pH    7.17 L*


 


ABG pCO2    23 L


 


ABG pO2    116 H


 


ABG HCO3    8 L*


 


ABG Total CO2    9 L


 


ABG O2 Saturation    97.9 H


 


ABG Lactic Acid   


 


VBG pH   


 


VBG pCO2   


 


VBG HCO3   


 


Potassium   


 


Chloride   


 


Carbon Dioxide   


 


Creatinine   


 


Glucose   


 


POC Glucose (mg/dL)   186 H 


 


Osmolality   


 


Plasma Lactic Acid Scott   


 


Calcium   


 


Total Bilirubin   


 


AST   


 


ALT   


 


Alkaline Phosphatase   


 


Ammonia   


 


Creatine Kinase   


 


Total Protein   


 


Albumin   


 


Urine Appearance   


 


Urine Protein   


 


Urine Glucose (UA)   


 


Urine Blood   


 


Ur Leukocyte Esterase   


 


Urine RBC   


 


Urine WBC   


 


Urine WBC Clumps   


 


Amorphous Sediment   


 


Urine Bacteria   


 


Hyaline Casts   


 


Urine Mucus   


 


Serum Alcohol   


 


Ethyl Alcohol Screen   


 


Hep B Core Total Ab   


 


Crossmatch   














  05/24/20 05/24/20 05/24/20





  02:32 02:35 02:35


 


RBC   2.79 L 


 


Hgb   8.5 L 


 


Hct   28.1 L 


 


MCV   100.7 H 


 


MCHC   30.4 L 


 


RDW   20.1 H 


 


Plt Count   97 L 


 


Neutrophils #   


 


Lymphocytes # (Manual)   


 


Metamyelocytes # (Man)   


 


Nucleated RBCs   


 


Macrocytosis   


 


PT   


 


INR   


 


APTT   


 


ABG pH   


 


ABG pCO2   


 


ABG pO2   


 


ABG HCO3   


 


ABG Total CO2   


 


ABG O2 Saturation   


 


ABG Lactic Acid   


 


VBG pH   


 


VBG pCO2   


 


VBG HCO3   


 


Potassium    5.7 H


 


Chloride   


 


Carbon Dioxide    9 L*


 


Creatinine   


 


Glucose   


 


POC Glucose (mg/dL)  229 H  


 


Osmolality   


 


Plasma Lactic Acid Scott   


 


Calcium   


 


Total Bilirubin   


 


AST   


 


ALT   


 


Alkaline Phosphatase   


 


Ammonia   


 


Creatine Kinase   


 


Total Protein   


 


Albumin   


 


Urine Appearance   


 


Urine Protein   


 


Urine Glucose (UA)   


 


Urine Blood   


 


Ur Leukocyte Esterase   


 


Urine RBC   


 


Urine WBC   


 


Urine WBC Clumps   


 


Amorphous Sediment   


 


Urine Bacteria   


 


Hyaline Casts   


 


Urine Mucus   


 


Serum Alcohol   


 


Ethyl Alcohol Screen   


 


Hep B Core Total Ab   


 


Crossmatch   














  05/24/20 05/24/20 05/24/20





  02:35 06:45 06:45


 


RBC   2.76 L 


 


Hgb   8.5 L 


 


Hct   27.4 L 


 


MCV   


 


MCHC   30.9 L 


 


RDW   20.0 H 


 


Plt Count   84 L 


 


Neutrophils #   


 


Lymphocytes # (Manual)   0.98 L 


 


Metamyelocytes # (Man)   


 


Nucleated RBCs   


 


Macrocytosis   


 


PT   


 


INR   


 


APTT   


 


ABG pH   


 


ABG pCO2   


 


ABG pO2   


 


ABG HCO3   


 


ABG Total CO2   


 


ABG O2 Saturation   


 


ABG Lactic Acid   


 


VBG pH   


 


VBG pCO2   


 


VBG HCO3   


 


Potassium    5.4 H


 


Chloride   


 


Carbon Dioxide    10 L


 


Creatinine    3.52 H


 


Glucose    231 H


 


POC Glucose (mg/dL)   


 


Osmolality   


 


Plasma Lactic Acid Scott   


 


Calcium    6.2 L*


 


Total Bilirubin    1.5 H


 


AST    355 H


 


ALT    50 H


 


Alkaline Phosphatase    221 H


 


Ammonia   


 


Creatine Kinase   


 


Total Protein    6.0 L


 


Albumin    2.9 L


 


Urine Appearance   


 


Urine Protein   


 


Urine Glucose (UA)   


 


Urine Blood   


 


Ur Leukocyte Esterase   


 


Urine RBC   


 


Urine WBC   


 


Urine WBC Clumps   


 


Amorphous Sediment   


 


Urine Bacteria   


 


Hyaline Casts   


 


Urine Mucus   


 


Serum Alcohol   


 


Ethyl Alcohol Screen   


 


Hep B Core Total Ab  Equivocal H  


 


Crossmatch   














  05/24/20 05/24/20 05/24/20





  06:45 06:45 06:47


 


RBC   


 


Hgb   


 


Hct   


 


MCV   


 


MCHC   


 


RDW   


 


Plt Count   


 


Neutrophils #   


 


Lymphocytes # (Manual)   


 


Metamyelocytes # (Man)   


 


Nucleated RBCs   


 


Macrocytosis   


 


PT   


 


INR   


 


APTT   


 


ABG pH   


 


ABG pCO2   


 


ABG pO2   


 


ABG HCO3   


 


ABG Total CO2   


 


ABG O2 Saturation   


 


ABG Lactic Acid   17.5 H* 


 


VBG pH   


 


VBG pCO2   


 


VBG HCO3   


 


Potassium   


 


Chloride   


 


Carbon Dioxide   


 


Creatinine   


 


Glucose   


 


POC Glucose (mg/dL)    240 H


 


Osmolality   


 


Plasma Lactic Acid Scott   


 


Calcium   


 


Total Bilirubin   


 


AST   


 


ALT   


 


Alkaline Phosphatase   


 


Ammonia  65 H  


 


Creatine Kinase   


 


Total Protein   


 


Albumin   


 


Urine Appearance   


 


Urine Protein   


 


Urine Glucose (UA)   


 


Urine Blood   


 


Ur Leukocyte Esterase   


 


Urine RBC   


 


Urine WBC   


 


Urine WBC Clumps   


 


Amorphous Sediment   


 


Urine Bacteria   


 


Hyaline Casts   


 


Urine Mucus   


 


Serum Alcohol   


 


Ethyl Alcohol Screen   


 


Hep B Core Total Ab   


 


Crossmatch   














  05/24/20 05/24/20 05/24/20





  07:48 11:09 13:15


 


RBC    2.79 L


 


Hgb    9.0 L


 


Hct    27.1 L


 


MCV   


 


MCHC   


 


RDW    20.4 H


 


Plt Count    71 L


 


Neutrophils #   


 


Lymphocytes # (Manual)   


 


Metamyelocytes # (Man)   


 


Nucleated RBCs   


 


Macrocytosis   


 


PT   


 


INR   


 


APTT   


 


ABG pH  7.28 L  


 


ABG pCO2  22 L  


 


ABG pO2   


 


ABG HCO3  10 L*  


 


ABG Total CO2  11 L  


 


ABG O2 Saturation  97.1 H  


 


ABG Lactic Acid   


 


VBG pH   


 


VBG pCO2   


 


VBG HCO3   


 


Potassium   


 


Chloride   


 


Carbon Dioxide   


 


Creatinine   


 


Glucose   


 


POC Glucose (mg/dL)   264 H 


 


Osmolality   


 


Plasma Lactic Acid Scott   


 


Calcium   


 


Total Bilirubin   


 


AST   


 


ALT   


 


Alkaline Phosphatase   


 


Ammonia   


 


Creatine Kinase   


 


Total Protein   


 


Albumin   


 


Urine Appearance   


 


Urine Protein   


 


Urine Glucose (UA)   


 


Urine Blood   


 


Ur Leukocyte Esterase   


 


Urine RBC   


 


Urine WBC   


 


Urine WBC Clumps   


 


Amorphous Sediment   


 


Urine Bacteria   


 


Hyaline Casts   


 


Urine Mucus   


 


Serum Alcohol   


 


Ethyl Alcohol Screen   


 


Hep B Core Total Ab   


 


Crossmatch   














  05/24/20 05/24/20 05/24/20





  14:06 14:09 16:28


 


RBC   


 


Hgb   


 


Hct   


 


MCV   


 


MCHC   


 


RDW   


 


Plt Count   


 


Neutrophils #   


 


Lymphocytes # (Manual)   


 


Metamyelocytes # (Man)   


 


Nucleated RBCs   


 


Macrocytosis   


 


PT   


 


INR   


 


APTT   


 


ABG pH   


 


ABG pCO2   


 


ABG pO2   


 


ABG HCO3   


 


ABG Total CO2   


 


ABG O2 Saturation   


 


ABG Lactic Acid   


 


VBG pH   


 


VBG pCO2   


 


VBG HCO3   


 


Potassium   


 


Chloride   


 


Carbon Dioxide   


 


Creatinine   


 


Glucose   


 


POC Glucose (mg/dL)  261 H   224 H


 


Osmolality   


 


Plasma Lactic Acid Scott   13.7 H* 


 


Calcium   


 


Total Bilirubin   


 


AST   


 


ALT   


 


Alkaline Phosphatase   


 


Ammonia   


 


Creatine Kinase   


 


Total Protein   


 


Albumin   


 


Urine Appearance   


 


Urine Protein   


 


Urine Glucose (UA)   


 


Urine Blood   


 


Ur Leukocyte Esterase   


 


Urine RBC   


 


Urine WBC   


 


Urine WBC Clumps   


 


Amorphous Sediment   


 


Urine Bacteria   


 


Hyaline Casts   


 


Urine Mucus   


 


Serum Alcohol   


 


Ethyl Alcohol Screen   


 


Hep B Core Total Ab   


 


Crossmatch   














  05/24/20 05/24/20 05/24/20





  17:09 17:43 18:38


 


RBC   


 


Hgb   


 


Hct   


 


MCV   


 


MCHC   


 


RDW   


 


Plt Count   


 


Neutrophils #   


 


Lymphocytes # (Manual)   


 


Metamyelocytes # (Man)   


 


Nucleated RBCs   


 


Macrocytosis   


 


PT   


 


INR   


 


APTT   


 


ABG pH   


 


ABG pCO2   32 L 


 


ABG pO2   


 


ABG HCO3   


 


ABG Total CO2   


 


ABG O2 Saturation   97.1 H 


 


ABG Lactic Acid   


 


VBG pH   


 


VBG pCO2   


 


VBG HCO3   


 


Potassium   


 


Chloride   


 


Carbon Dioxide   


 


Creatinine   


 


Glucose   


 


POC Glucose (mg/dL)  200 H   183 H


 


Osmolality   


 


Plasma Lactic Acid Scott   


 


Calcium   


 


Total Bilirubin   


 


AST   


 


ALT   


 


Alkaline Phosphatase   


 


Ammonia   


 


Creatine Kinase   


 


Total Protein   


 


Albumin   


 


Urine Appearance   


 


Urine Protein   


 


Urine Glucose (UA)   


 


Urine Blood   


 


Ur Leukocyte Esterase   


 


Urine RBC   


 


Urine WBC   


 


Urine WBC Clumps   


 


Amorphous Sediment   


 


Urine Bacteria   


 


Hyaline Casts   


 


Urine Mucus   


 


Serum Alcohol   


 


Ethyl Alcohol Screen   


 


Hep B Core Total Ab   


 


Crossmatch   














  05/24/20 05/24/20 05/24/20





  19:05 20:27 21:24


 


RBC   


 


Hgb   


 


Hct   


 


MCV   


 


MCHC   


 


RDW   


 


Plt Count   


 


Neutrophils #   


 


Lymphocytes # (Manual)   


 


Metamyelocytes # (Man)   


 


Nucleated RBCs   


 


Macrocytosis   


 


PT   


 


INR   


 


APTT   


 


ABG pH   


 


ABG pCO2   


 


ABG pO2   


 


ABG HCO3   


 


ABG Total CO2   


 


ABG O2 Saturation   


 


ABG Lactic Acid   


 


VBG pH   


 


VBG pCO2   


 


VBG HCO3   


 


Potassium   


 


Chloride   


 


Carbon Dioxide   


 


Creatinine  3.66 H  


 


Glucose  163 H  


 


POC Glucose (mg/dL)   151 H  168 H


 


Osmolality   


 


Plasma Lactic Acid Scott   


 


Calcium  6.2 L*  


 


Total Bilirubin   


 


AST   


 


ALT   


 


Alkaline Phosphatase   


 


Ammonia   


 


Creatine Kinase   


 


Total Protein   


 


Albumin   


 


Urine Appearance   


 


Urine Protein   


 


Urine Glucose (UA)   


 


Urine Blood   


 


Ur Leukocyte Esterase   


 


Urine RBC   


 


Urine WBC   


 


Urine WBC Clumps   


 


Amorphous Sediment   


 


Urine Bacteria   


 


Hyaline Casts   


 


Urine Mucus   


 


Serum Alcohol   


 


Ethyl Alcohol Screen   


 


Hep B Core Total Ab   


 


Crossmatch   














  05/24/20 05/24/20 05/24/20





  21:59 23:33 23:35


 


RBC   


 


Hgb   


 


Hct   


 


MCV   


 


MCHC   


 


RDW   


 


Plt Count   


 


Neutrophils #   


 


Lymphocytes # (Manual)   


 


Metamyelocytes # (Man)   


 


Nucleated RBCs   


 


Macrocytosis   


 


PT   


 


INR   


 


APTT   


 


ABG pH   


 


ABG pCO2   


 


ABG pO2   


 


ABG HCO3   


 


ABG Total CO2   


 


ABG O2 Saturation   


 


ABG Lactic Acid   


 


VBG pH   


 


VBG pCO2   


 


VBG HCO3   


 


Potassium   


 


Chloride   


 


Carbon Dioxide   


 


Creatinine   


 


Glucose   


 


POC Glucose (mg/dL)  170 H  170 H 


 


Osmolality   


 


Plasma Lactic Acid Scott    6.2 H*


 


Calcium   


 


Total Bilirubin   


 


AST   


 


ALT   


 


Alkaline Phosphatase   


 


Ammonia   


 


Creatine Kinase   


 


Total Protein   


 


Albumin   


 


Urine Appearance   


 


Urine Protein   


 


Urine Glucose (UA)   


 


Urine Blood   


 


Ur Leukocyte Esterase   


 


Urine RBC   


 


Urine WBC   


 


Urine WBC Clumps   


 


Amorphous Sediment   


 


Urine Bacteria   


 


Hyaline Casts   


 


Urine Mucus   


 


Serum Alcohol   


 


Ethyl Alcohol Screen   


 


Hep B Core Total Ab   


 


Crossmatch   














  05/25/20 05/25/20 05/25/20





  00:35 02:11 03:34


 


RBC   


 


Hgb   


 


Hct   


 


MCV   


 


MCHC   


 


RDW   


 


Plt Count   


 


Neutrophils #   


 


Lymphocytes # (Manual)   


 


Metamyelocytes # (Man)   


 


Nucleated RBCs   


 


Macrocytosis   


 


PT   


 


INR   


 


APTT   


 


ABG pH   


 


ABG pCO2   


 


ABG pO2   


 


ABG HCO3   


 


ABG Total CO2   


 


ABG O2 Saturation   


 


ABG Lactic Acid   


 


VBG pH   


 


VBG pCO2   


 


VBG HCO3   


 


Potassium   


 


Chloride   


 


Carbon Dioxide   


 


Creatinine   


 


Glucose   


 


POC Glucose (mg/dL)  184 H  148 H  152 H


 


Osmolality   


 


Plasma Lactic Acid Scott   


 


Calcium   


 


Total Bilirubin   


 


AST   


 


ALT   


 


Alkaline Phosphatase   


 


Ammonia   


 


Creatine Kinase   


 


Total Protein   


 


Albumin   


 


Urine Appearance   


 


Urine Protein   


 


Urine Glucose (UA)   


 


Urine Blood   


 


Ur Leukocyte Esterase   


 


Urine RBC   


 


Urine WBC   


 


Urine WBC Clumps   


 


Amorphous Sediment   


 


Urine Bacteria   


 


Hyaline Casts   


 


Urine Mucus   


 


Serum Alcohol   


 


Ethyl Alcohol Screen   


 


Hep B Core Total Ab   


 


Crossmatch   














  05/25/20 05/25/20 05/25/20





  04:22 04:25 04:25


 


RBC    2.94 L


 


Hgb    9.3 L


 


Hct    27.8 L


 


MCV   


 


MCHC   


 


RDW    20.3 H


 


Plt Count    47 L


 


Neutrophils #   


 


Lymphocytes # (Manual)    0.79 L


 


Metamyelocytes # (Man)    0.18 H


 


Nucleated RBCs    1 H


 


Macrocytosis   


 


PT   


 


INR   


 


APTT   


 


ABG pH   


 


ABG pCO2   


 


ABG pO2   


 


ABG HCO3   


 


ABG Total CO2   


 


ABG O2 Saturation   


 


ABG Lactic Acid   


 


VBG pH   


 


VBG pCO2   


 


VBG HCO3   


 


Potassium   


 


Chloride   


 


Carbon Dioxide   


 


Creatinine   


 


Glucose   


 


POC Glucose (mg/dL)  138 H  


 


Osmolality   


 


Plasma Lactic Acid Scott   5.0 H* 


 


Calcium   


 


Total Bilirubin   


 


AST   


 


ALT   


 


Alkaline Phosphatase   


 


Ammonia   


 


Creatine Kinase   


 


Total Protein   


 


Albumin   


 


Urine Appearance   


 


Urine Protein   


 


Urine Glucose (UA)   


 


Urine Blood   


 


Ur Leukocyte Esterase   


 


Urine RBC   


 


Urine WBC   


 


Urine WBC Clumps   


 


Amorphous Sediment   


 


Urine Bacteria   


 


Hyaline Casts   


 


Urine Mucus   


 


Serum Alcohol   


 


Ethyl Alcohol Screen   


 


Hep B Core Total Ab   


 


Crossmatch   














  05/25/20 05/25/20 05/25/20





  04:25 05:31 06:30


 


RBC   


 


Hgb   


 


Hct   


 


MCV   


 


MCHC   


 


RDW   


 


Plt Count   


 


Neutrophils #   


 


Lymphocytes # (Manual)   


 


Metamyelocytes # (Man)   


 


Nucleated RBCs   


 


Macrocytosis   


 


PT   


 


INR   


 


APTT   


 


ABG pH   


 


ABG pCO2   


 


ABG pO2   


 


ABG HCO3   


 


ABG Total CO2   


 


ABG O2 Saturation   


 


ABG Lactic Acid   


 


VBG pH   


 


VBG pCO2   


 


VBG HCO3   


 


Potassium   


 


Chloride   


 


Carbon Dioxide   


 


Creatinine  4.00 H  


 


Glucose  127 H  


 


POC Glucose (mg/dL)   120 H  127 H


 


Osmolality   


 


Plasma Lactic Acid Scott   


 


Calcium  6.5 L  


 


Total Bilirubin  2.2 H  


 


AST  541 H  


 


ALT  75 H  


 


Alkaline Phosphatase  216 H  


 


Ammonia   


 


Creatine Kinase   


 


Total Protein  5.8 L  


 


Albumin  2.7 L  


 


Urine Appearance   


 


Urine Protein   


 


Urine Glucose (UA)   


 


Urine Blood   


 


Ur Leukocyte Esterase   


 


Urine RBC   


 


Urine WBC   


 


Urine WBC Clumps   


 


Amorphous Sediment   


 


Urine Bacteria   


 


Hyaline Casts   


 


Urine Mucus   


 


Serum Alcohol   


 


Ethyl Alcohol Screen   


 


Hep B Core Total Ab   


 


Crossmatch   














  05/25/20 05/25/20 05/25/20





  07:27 08:06 09:05


 


RBC   


 


Hgb   


 


Hct   


 


MCV   


 


MCHC   


 


RDW   


 


Plt Count   


 


Neutrophils #   


 


Lymphocytes # (Manual)   


 


Metamyelocytes # (Man)   


 


Nucleated RBCs   


 


Macrocytosis   


 


PT   


 


INR   


 


APTT   


 


ABG pH  7.58 H*  


 


ABG pCO2  32 L  


 


ABG pO2  117 H  


 


ABG HCO3  30 H  


 


ABG Total CO2  31 H  


 


ABG O2 Saturation  99.1 H  


 


ABG Lactic Acid   


 


VBG pH   


 


VBG pCO2   


 


VBG HCO3   


 


Potassium   


 


Chloride   


 


Carbon Dioxide   


 


Creatinine   


 


Glucose   


 


POC Glucose (mg/dL)   172 H 


 


Osmolality   


 


Plasma Lactic Acid Scott    4.6 H*


 


Calcium   


 


Total Bilirubin   


 


AST   


 


ALT   


 


Alkaline Phosphatase   


 


Ammonia    44 H


 


Creatine Kinase   


 


Total Protein   


 


Albumin   


 


Urine Appearance   


 


Urine Protein   


 


Urine Glucose (UA)   


 


Urine Blood   


 


Ur Leukocyte Esterase   


 


Urine RBC   


 


Urine WBC   


 


Urine WBC Clumps   


 


Amorphous Sediment   


 


Urine Bacteria   


 


Hyaline Casts   


 


Urine Mucus   


 


Serum Alcohol   


 


Ethyl Alcohol Screen   


 


Hep B Core Total Ab   


 


Crossmatch   














  05/25/20 05/25/20 05/25/20





  09:05 10:46 12:09


 


RBC   


 


Hgb   


 


Hct   


 


MCV   


 


MCHC   


 


RDW   


 


Plt Count   


 


Neutrophils #   


 


Lymphocytes # (Manual)   


 


Metamyelocytes # (Man)   


 


Nucleated RBCs   


 


Macrocytosis   


 


PT   


 


INR   


 


APTT   


 


ABG pH   


 


ABG pCO2   


 


ABG pO2   


 


ABG HCO3   


 


ABG Total CO2   


 


ABG O2 Saturation   


 


ABG Lactic Acid   


 


VBG pH   


 


VBG pCO2   


 


VBG HCO3   


 


Potassium   


 


Chloride   


 


Carbon Dioxide   


 


Creatinine   


 


Glucose   


 


POC Glucose (mg/dL)  187 H  177 H  146 H


 


Osmolality   


 


Plasma Lactic Acid Scott   


 


Calcium   


 


Total Bilirubin   


 


AST   


 


ALT   


 


Alkaline Phosphatase   


 


Ammonia   


 


Creatine Kinase   


 


Total Protein   


 


Albumin   


 


Urine Appearance   


 


Urine Protein   


 


Urine Glucose (UA)   


 


Urine Blood   


 


Ur Leukocyte Esterase   


 


Urine RBC   


 


Urine WBC   


 


Urine WBC Clumps   


 


Amorphous Sediment   


 


Urine Bacteria   


 


Hyaline Casts   


 


Urine Mucus   


 


Serum Alcohol   


 


Ethyl Alcohol Screen   


 


Hep B Core Total Ab   


 


Crossmatch   














  05/25/20 05/25/20 05/25/20





  13:19 13:25 15:32


 


RBC   


 


Hgb   


 


Hct   


 


MCV   


 


MCHC   


 


RDW   


 


Plt Count   


 


Neutrophils #   


 


Lymphocytes # (Manual)   


 


Metamyelocytes # (Man)   


 


Nucleated RBCs   


 


Macrocytosis   


 


PT   


 


INR   


 


APTT   


 


ABG pH   


 


ABG pCO2   


 


ABG pO2   


 


ABG HCO3   


 


ABG Total CO2   


 


ABG O2 Saturation   


 


ABG Lactic Acid   


 


VBG pH   


 


VBG pCO2   


 


VBG HCO3   


 


Potassium   


 


Chloride   


 


Carbon Dioxide   


 


Creatinine   


 


Glucose   


 


POC Glucose (mg/dL)  140 H   125 H


 


Osmolality   


 


Plasma Lactic Acid Scott   4.5 H* 


 


Calcium   


 


Total Bilirubin   


 


AST   


 


ALT   


 


Alkaline Phosphatase   


 


Ammonia   


 


Creatine Kinase   


 


Total Protein   


 


Albumin   


 


Urine Appearance   


 


Urine Protein   


 


Urine Glucose (UA)   


 


Urine Blood   


 


Ur Leukocyte Esterase   


 


Urine RBC   


 


Urine WBC   


 


Urine WBC Clumps   


 


Amorphous Sediment   


 


Urine Bacteria   


 


Hyaline Casts   


 


Urine Mucus   


 


Serum Alcohol   


 


Ethyl Alcohol Screen   


 


Hep B Core Total Ab   


 


Crossmatch   














  05/25/20 05/25/20 05/25/20





  16:21 18:13 19:11


 


RBC   


 


Hgb   


 


Hct   


 


MCV   


 


MCHC   


 


RDW   


 


Plt Count   


 


Neutrophils #   


 


Lymphocytes # (Manual)   


 


Metamyelocytes # (Man)   


 


Nucleated RBCs   


 


Macrocytosis   


 


PT   


 


INR   


 


APTT   


 


ABG pH   


 


ABG pCO2   


 


ABG pO2   


 


ABG HCO3   


 


ABG Total CO2   


 


ABG O2 Saturation   


 


ABG Lactic Acid   


 


VBG pH   


 


VBG pCO2   


 


VBG HCO3   


 


Potassium   


 


Chloride   


 


Carbon Dioxide   


 


Creatinine   


 


Glucose   


 


POC Glucose (mg/dL)  112 H  130 H  134 H


 


Osmolality   


 


Plasma Lactic Acid Scott   


 


Calcium   


 


Total Bilirubin   


 


AST   


 


ALT   


 


Alkaline Phosphatase   


 


Ammonia   


 


Creatine Kinase   


 


Total Protein   


 


Albumin   


 


Urine Appearance   


 


Urine Protein   


 


Urine Glucose (UA)   


 


Urine Blood   


 


Ur Leukocyte Esterase   


 


Urine RBC   


 


Urine WBC   


 


Urine WBC Clumps   


 


Amorphous Sediment   


 


Urine Bacteria   


 


Hyaline Casts   


 


Urine Mucus   


 


Serum Alcohol   


 


Ethyl Alcohol Screen   


 


Hep B Core Total Ab   


 


Crossmatch   














  05/25/20 05/25/20 05/25/20





  19:48 21:11 22:03


 


RBC   


 


Hgb   


 


Hct   


 


MCV   


 


MCHC   


 


RDW   


 


Plt Count   


 


Neutrophils #   


 


Lymphocytes # (Manual)   


 


Metamyelocytes # (Man)   


 


Nucleated RBCs   


 


Macrocytosis   


 


PT   


 


INR   


 


APTT   


 


ABG pH   


 


ABG pCO2   


 


ABG pO2   


 


ABG HCO3   


 


ABG Total CO2   


 


ABG O2 Saturation   


 


ABG Lactic Acid   


 


VBG pH   


 


VBG pCO2   


 


VBG HCO3   


 


Potassium   


 


Chloride   


 


Carbon Dioxide   


 


Creatinine   


 


Glucose   


 


POC Glucose (mg/dL)  144 H  125 H  117 H


 


Osmolality   


 


Plasma Lactic Acid Scott   


 


Calcium   


 


Total Bilirubin   


 


AST   


 


ALT   


 


Alkaline Phosphatase   


 


Ammonia   


 


Creatine Kinase   


 


Total Protein   


 


Albumin   


 


Urine Appearance   


 


Urine Protein   


 


Urine Glucose (UA)   


 


Urine Blood   


 


Ur Leukocyte Esterase   


 


Urine RBC   


 


Urine WBC   


 


Urine WBC Clumps   


 


Amorphous Sediment   


 


Urine Bacteria   


 


Hyaline Casts   


 


Urine Mucus   


 


Serum Alcohol   


 


Ethyl Alcohol Screen   


 


Hep B Core Total Ab   


 


Crossmatch   














  05/25/20 05/26/20 05/26/20





  23:00 00:12 00:57


 


RBC   


 


Hgb   


 


Hct   


 


MCV   


 


MCHC   


 


RDW   


 


Plt Count   


 


Neutrophils #   


 


Lymphocytes # (Manual)   


 


Metamyelocytes # (Man)   


 


Nucleated RBCs   


 


Macrocytosis   


 


PT   


 


INR   


 


APTT   


 


ABG pH   


 


ABG pCO2   


 


ABG pO2   


 


ABG HCO3   


 


ABG Total CO2   


 


ABG O2 Saturation   


 


ABG Lactic Acid   


 


VBG pH   


 


VBG pCO2   


 


VBG HCO3   


 


Potassium   


 


Chloride   


 


Carbon Dioxide   


 


Creatinine   


 


Glucose   


 


POC Glucose (mg/dL)  132 H  128 H  124 H


 


Osmolality   


 


Plasma Lactic Acid Scott   


 


Calcium   


 


Total Bilirubin   


 


AST   


 


ALT   


 


Alkaline Phosphatase   


 


Ammonia   


 


Creatine Kinase   


 


Total Protein   


 


Albumin   


 


Urine Appearance   


 


Urine Protein   


 


Urine Glucose (UA)   


 


Urine Blood   


 


Ur Leukocyte Esterase   


 


Urine RBC   


 


Urine WBC   


 


Urine WBC Clumps   


 


Amorphous Sediment   


 


Urine Bacteria   


 


Hyaline Casts   


 


Urine Mucus   


 


Serum Alcohol   


 


Ethyl Alcohol Screen   


 


Hep B Core Total Ab   


 


Crossmatch   














  05/26/20 05/26/20 05/26/20





  01:55 02:54 03:58


 


RBC   


 


Hgb   


 


Hct   


 


MCV   


 


MCHC   


 


RDW   


 


Plt Count   


 


Neutrophils #   


 


Lymphocytes # (Manual)   


 


Metamyelocytes # (Man)   


 


Nucleated RBCs   


 


Macrocytosis   


 


PT   


 


INR   


 


APTT   


 


ABG pH   


 


ABG pCO2   


 


ABG pO2   


 


ABG HCO3   


 


ABG Total CO2   


 


ABG O2 Saturation   


 


ABG Lactic Acid   


 


VBG pH   


 


VBG pCO2   


 


VBG HCO3   


 


Potassium   


 


Chloride   


 


Carbon Dioxide   


 


Creatinine   


 


Glucose   


 


POC Glucose (mg/dL)  144 H  127 H  138 H


 


Osmolality   


 


Plasma Lactic Acid Scott   


 


Calcium   


 


Total Bilirubin   


 


AST   


 


ALT   


 


Alkaline Phosphatase   


 


Ammonia   


 


Creatine Kinase   


 


Total Protein   


 


Albumin   


 


Urine Appearance   


 


Urine Protein   


 


Urine Glucose (UA)   


 


Urine Blood   


 


Ur Leukocyte Esterase   


 


Urine RBC   


 


Urine WBC   


 


Urine WBC Clumps   


 


Amorphous Sediment   


 


Urine Bacteria   


 


Hyaline Casts   


 


Urine Mucus   


 


Serum Alcohol   


 


Ethyl Alcohol Screen   


 


Hep B Core Total Ab   


 


Crossmatch   














  05/26/20 05/26/20 05/26/20





  04:00 04:00 05:02


 


RBC  3.05 L  


 


Hgb  9.1 L  


 


Hct  28.7 L  


 


MCV   


 


MCHC   


 


RDW  19.7 H  


 


Plt Count  40 L  


 


Neutrophils #   


 


Lymphocytes # (Manual)   


 


Metamyelocytes # (Man)   


 


Nucleated RBCs   


 


Macrocytosis   


 


PT   


 


INR   


 


APTT   


 


ABG pH    7.50 H


 


ABG pCO2   


 


ABG pO2    146 H


 


ABG HCO3    32 H


 


ABG Total CO2    33 H


 


ABG O2 Saturation    98.7 H


 


ABG Lactic Acid   


 


VBG pH   


 


VBG pCO2   


 


VBG HCO3   


 


Potassium   


 


Chloride   


 


Carbon Dioxide   32 H 


 


Creatinine   3.34 H 


 


Glucose   129 H 


 


POC Glucose (mg/dL)   


 


Osmolality   


 


Plasma Lactic Acid Scott   


 


Calcium   6.6 L 


 


Total Bilirubin   2.3 H 


 


AST   363 H 


 


ALT   67 H 


 


Alkaline Phosphatase   187 H 


 


Ammonia   


 


Creatine Kinase   


 


Total Protein   5.0 L 


 


Albumin   2.2 L 


 


Urine Appearance   


 


Urine Protein   


 


Urine Glucose (UA)   


 


Urine Blood   


 


Ur Leukocyte Esterase   


 


Urine RBC   


 


Urine WBC   


 


Urine WBC Clumps   


 


Amorphous Sediment   


 


Urine Bacteria   


 


Hyaline Casts   


 


Urine Mucus   


 


Serum Alcohol   


 


Ethyl Alcohol Screen   


 


Hep B Core Total Ab   


 


Crossmatch   














  05/26/20 05/26/20 05/26/20





  05:55 06:52 08:33


 


RBC   


 


Hgb   


 


Hct   


 


MCV   


 


MCHC   


 


RDW   


 


Plt Count   


 


Neutrophils #   


 


Lymphocytes # (Manual)   


 


Metamyelocytes # (Man)   


 


Nucleated RBCs   


 


Macrocytosis   


 


PT   


 


INR   


 


APTT   


 


ABG pH   


 


ABG pCO2   


 


ABG pO2   


 


ABG HCO3   


 


ABG Total CO2   


 


ABG O2 Saturation   


 


ABG Lactic Acid   


 


VBG pH   


 


VBG pCO2   


 


VBG HCO3   


 


Potassium   


 


Chloride   


 


Carbon Dioxide   


 


Creatinine   


 


Glucose   


 


POC Glucose (mg/dL)  167 H  140 H  119 H


 


Osmolality   


 


Plasma Lactic Acid Scott   


 


Calcium   


 


Total Bilirubin   


 


AST   


 


ALT   


 


Alkaline Phosphatase   


 


Ammonia   


 


Creatine Kinase   


 


Total Protein   


 


Albumin   


 


Urine Appearance   


 


Urine Protein   


 


Urine Glucose (UA)   


 


Urine Blood   


 


Ur Leukocyte Esterase   


 


Urine RBC   


 


Urine WBC   


 


Urine WBC Clumps   


 


Amorphous Sediment   


 


Urine Bacteria   


 


Hyaline Casts   


 


Urine Mucus   


 


Serum Alcohol   


 


Ethyl Alcohol Screen   


 


Hep B Core Total Ab   


 


Crossmatch   














  05/26/20 05/26/20 05/26/20





  10:21 11:39 12:39


 


RBC   


 


Hgb   


 


Hct   


 


MCV   


 


MCHC   


 


RDW   


 


Plt Count   


 


Neutrophils #   


 


Lymphocytes # (Manual)   


 


Metamyelocytes # (Man)   


 


Nucleated RBCs   


 


Macrocytosis   


 


PT   


 


INR   


 


APTT   


 


ABG pH   


 


ABG pCO2   


 


ABG pO2   


 


ABG HCO3   


 


ABG Total CO2   


 


ABG O2 Saturation   


 


ABG Lactic Acid   


 


VBG pH   


 


VBG pCO2   


 


VBG HCO3   


 


Potassium   


 


Chloride   


 


Carbon Dioxide   


 


Creatinine   


 


Glucose   


 


POC Glucose (mg/dL)  111 H  126 H  128 H


 


Osmolality   


 


Plasma Lactic Acid Scott   


 


Calcium   


 


Total Bilirubin   


 


AST   


 


ALT   


 


Alkaline Phosphatase   


 


Ammonia   


 


Creatine Kinase   


 


Total Protein   


 


Albumin   


 


Urine Appearance   


 


Urine Protein   


 


Urine Glucose (UA)   


 


Urine Blood   


 


Ur Leukocyte Esterase   


 


Urine RBC   


 


Urine WBC   


 


Urine WBC Clumps   


 


Amorphous Sediment   


 


Urine Bacteria   


 


Hyaline Casts   


 


Urine Mucus   


 


Serum Alcohol   


 


Ethyl Alcohol Screen   


 


Hep B Core Total Ab   


 


Crossmatch   














  05/26/20 05/26/20 05/26/20





  14:24 15:10 16:57


 


RBC   


 


Hgb   


 


Hct   


 


MCV   


 


MCHC   


 


RDW   


 


Plt Count   


 


Neutrophils #   


 


Lymphocytes # (Manual)   


 


Metamyelocytes # (Man)   


 


Nucleated RBCs   


 


Macrocytosis   


 


PT   


 


INR   


 


APTT   


 


ABG pH   


 


ABG pCO2   


 


ABG pO2   


 


ABG HCO3   


 


ABG Total CO2   


 


ABG O2 Saturation   


 


ABG Lactic Acid   


 


VBG pH   


 


VBG pCO2   


 


VBG HCO3   


 


Potassium   


 


Chloride   


 


Carbon Dioxide   


 


Creatinine   


 


Glucose   


 


POC Glucose (mg/dL)  108 H  135 H  153 H


 


Osmolality   


 


Plasma Lactic Acid Scott   


 


Calcium   


 


Total Bilirubin   


 


AST   


 


ALT   


 


Alkaline Phosphatase   


 


Ammonia   


 


Creatine Kinase   


 


Total Protein   


 


Albumin   


 


Urine Appearance   


 


Urine Protein   


 


Urine Glucose (UA)   


 


Urine Blood   


 


Ur Leukocyte Esterase   


 


Urine RBC   


 


Urine WBC   


 


Urine WBC Clumps   


 


Amorphous Sediment   


 


Urine Bacteria   


 


Hyaline Casts   


 


Urine Mucus   


 


Serum Alcohol   


 


Ethyl Alcohol Screen   


 


Hep B Core Total Ab   


 


Crossmatch   














  05/26/20 05/26/20 05/26/20





  18:37 19:57 21:09


 


RBC   


 


Hgb   


 


Hct   


 


MCV   


 


MCHC   


 


RDW   


 


Plt Count   


 


Neutrophils #   


 


Lymphocytes # (Manual)   


 


Metamyelocytes # (Man)   


 


Nucleated RBCs   


 


Macrocytosis   


 


PT   


 


INR   


 


APTT   


 


ABG pH   


 


ABG pCO2   


 


ABG pO2   


 


ABG HCO3   


 


ABG Total CO2   


 


ABG O2 Saturation   


 


ABG Lactic Acid   


 


VBG pH   


 


VBG pCO2   


 


VBG HCO3   


 


Potassium   


 


Chloride   


 


Carbon Dioxide   


 


Creatinine   


 


Glucose   


 


POC Glucose (mg/dL)  159 H  160 H  157 H


 


Osmolality   


 


Plasma Lactic Acid Scott   


 


Calcium   


 


Total Bilirubin   


 


AST   


 


ALT   


 


Alkaline Phosphatase   


 


Ammonia   


 


Creatine Kinase   


 


Total Protein   


 


Albumin   


 


Urine Appearance   


 


Urine Protein   


 


Urine Glucose (UA)   


 


Urine Blood   


 


Ur Leukocyte Esterase   


 


Urine RBC   


 


Urine WBC   


 


Urine WBC Clumps   


 


Amorphous Sediment   


 


Urine Bacteria   


 


Hyaline Casts   


 


Urine Mucus   


 


Serum Alcohol   


 


Ethyl Alcohol Screen   


 


Hep B Core Total Ab   


 


Crossmatch   














  05/26/20 05/26/20 05/26/20





  22:03 22:59 23:53


 


RBC   


 


Hgb   


 


Hct   


 


MCV   


 


MCHC   


 


RDW   


 


Plt Count   


 


Neutrophils #   


 


Lymphocytes # (Manual)   


 


Metamyelocytes # (Man)   


 


Nucleated RBCs   


 


Macrocytosis   


 


PT   


 


INR   


 


APTT   


 


ABG pH   


 


ABG pCO2   


 


ABG pO2   


 


ABG HCO3   


 


ABG Total CO2   


 


ABG O2 Saturation   


 


ABG Lactic Acid   


 


VBG pH   


 


VBG pCO2   


 


VBG HCO3   


 


Potassium   


 


Chloride   


 


Carbon Dioxide   


 


Creatinine   


 


Glucose   


 


POC Glucose (mg/dL)  204 H  162 H  177 H


 


Osmolality   


 


Plasma Lactic Acid Scott   


 


Calcium   


 


Total Bilirubin   


 


AST   


 


ALT   


 


Alkaline Phosphatase   


 


Ammonia   


 


Creatine Kinase   


 


Total Protein   


 


Albumin   


 


Urine Appearance   


 


Urine Protein   


 


Urine Glucose (UA)   


 


Urine Blood   


 


Ur Leukocyte Esterase   


 


Urine RBC   


 


Urine WBC   


 


Urine WBC Clumps   


 


Amorphous Sediment   


 


Urine Bacteria   


 


Hyaline Casts   


 


Urine Mucus   


 


Serum Alcohol   


 


Ethyl Alcohol Screen   


 


Hep B Core Total Ab   


 


Crossmatch   














  05/27/20 05/27/20 05/27/20





  00:54 01:54 03:02


 


RBC   


 


Hgb   


 


Hct   


 


MCV   


 


MCHC   


 


RDW   


 


Plt Count   


 


Neutrophils #   


 


Lymphocytes # (Manual)   


 


Metamyelocytes # (Man)   


 


Nucleated RBCs   


 


Macrocytosis   


 


PT   


 


INR   


 


APTT   


 


ABG pH   


 


ABG pCO2   


 


ABG pO2   


 


ABG HCO3   


 


ABG Total CO2   


 


ABG O2 Saturation   


 


ABG Lactic Acid   


 


VBG pH   


 


VBG pCO2   


 


VBG HCO3   


 


Potassium   


 


Chloride   


 


Carbon Dioxide   


 


Creatinine   


 


Glucose   


 


POC Glucose (mg/dL)  196 H  184 H  167 H


 


Osmolality   


 


Plasma Lactic Acid Scott   


 


Calcium   


 


Total Bilirubin   


 


AST   


 


ALT   


 


Alkaline Phosphatase   


 


Ammonia   


 


Creatine Kinase   


 


Total Protein   


 


Albumin   


 


Urine Appearance   


 


Urine Protein   


 


Urine Glucose (UA)   


 


Urine Blood   


 


Ur Leukocyte Esterase   


 


Urine RBC   


 


Urine WBC   


 


Urine WBC Clumps   


 


Amorphous Sediment   


 


Urine Bacteria   


 


Hyaline Casts   


 


Urine Mucus   


 


Serum Alcohol   


 


Ethyl Alcohol Screen   


 


Hep B Core Total Ab   


 


Crossmatch   














  05/27/20 05/27/20 05/27/20





  04:00 04:00 04:00


 


RBC  3.15 L  


 


Hgb  9.7 L  


 


Hct  30.1 L  


 


MCV   


 


MCHC   


 


RDW  19.7 H  


 


Plt Count  39 L  


 


Neutrophils #  8.2 H  


 


Lymphocytes # (Manual)   


 


Metamyelocytes # (Man)   


 


Nucleated RBCs   


 


Macrocytosis   


 


PT   


 


INR   


 


APTT   


 


ABG pH   


 


ABG pCO2   


 


ABG pO2   


 


ABG HCO3   


 


ABG Total CO2   


 


ABG O2 Saturation   


 


ABG Lactic Acid   


 


VBG pH   


 


VBG pCO2   


 


VBG HCO3   


 


Potassium   3.4 L 


 


Chloride   


 


Carbon Dioxide   33 H 


 


Creatinine   2.53 H 


 


Glucose   160 H 


 


POC Glucose (mg/dL)    169 H


 


Osmolality   


 


Plasma Lactic Acid Scott   


 


Calcium   7.0 L 


 


Total Bilirubin   2.0 H 


 


AST   261 H 


 


ALT   67 H 


 


Alkaline Phosphatase   189 H 


 


Ammonia   


 


Creatine Kinase   


 


Total Protein   5.1 L 


 


Albumin   2.2 L 


 


Urine Appearance   


 


Urine Protein   


 


Urine Glucose (UA)   


 


Urine Blood   


 


Ur Leukocyte Esterase   


 


Urine RBC   


 


Urine WBC   


 


Urine WBC Clumps   


 


Amorphous Sediment   


 


Urine Bacteria   


 


Hyaline Casts   


 


Urine Mucus   


 


Serum Alcohol   


 


Ethyl Alcohol Screen   


 


Hep B Core Total Ab   


 


Crossmatch   














  05/27/20 05/27/20 05/27/20





  04:54 05:06 05:56


 


RBC   


 


Hgb   


 


Hct   


 


MCV   


 


MCHC   


 


RDW   


 


Plt Count   


 


Neutrophils #   


 


Lymphocytes # (Manual)   


 


Metamyelocytes # (Man)   


 


Nucleated RBCs   


 


Macrocytosis   


 


PT   


 


INR   


 


APTT   


 


ABG pH   7.48 H 


 


ABG pCO2   


 


ABG pO2   112 H 


 


ABG HCO3   31 H 


 


ABG Total CO2   32 H 


 


ABG O2 Saturation   98.1 H 


 


ABG Lactic Acid   


 


VBG pH   


 


VBG pCO2   


 


VBG HCO3   


 


Potassium   


 


Chloride   


 


Carbon Dioxide   


 


Creatinine   


 


Glucose   


 


POC Glucose (mg/dL)  155 H   184 H


 


Osmolality   


 


Plasma Lactic Acid Scott   


 


Calcium   


 


Total Bilirubin   


 


AST   


 


ALT   


 


Alkaline Phosphatase   


 


Ammonia   


 


Creatine Kinase   


 


Total Protein   


 


Albumin   


 


Urine Appearance   


 


Urine Protein   


 


Urine Glucose (UA)   


 


Urine Blood   


 


Ur Leukocyte Esterase   


 


Urine RBC   


 


Urine WBC   


 


Urine WBC Clumps   


 


Amorphous Sediment   


 


Urine Bacteria   


 


Hyaline Casts   


 


Urine Mucus   


 


Serum Alcohol   


 


Ethyl Alcohol Screen   


 


Hep B Core Total Ab   


 


Crossmatch   














  05/27/20 05/27/20 05/27/20





  06:59 09:14 09:38


 


RBC   


 


Hgb   


 


Hct   


 


MCV   


 


MCHC   


 


RDW   


 


Plt Count   


 


Neutrophils #   


 


Lymphocytes # (Manual)   


 


Metamyelocytes # (Man)   


 


Nucleated RBCs   


 


Macrocytosis   


 


PT   


 


INR   


 


APTT   


 


ABG pH    7.50 H


 


ABG pCO2   


 


ABG pO2    77 L


 


ABG HCO3    31 H


 


ABG Total CO2    33 H


 


ABG O2 Saturation   


 


ABG Lactic Acid   


 


VBG pH   


 


VBG pCO2   


 


VBG HCO3   


 


Potassium   


 


Chloride   


 


Carbon Dioxide   


 


Creatinine   


 


Glucose   


 


POC Glucose (mg/dL)  157 H  136 H 


 


Osmolality   


 


Plasma Lactic Acid Scott   


 


Calcium   


 


Total Bilirubin   


 


AST   


 


ALT   


 


Alkaline Phosphatase   


 


Ammonia   


 


Creatine Kinase   


 


Total Protein   


 


Albumin   


 


Urine Appearance   


 


Urine Protein   


 


Urine Glucose (UA)   


 


Urine Blood   


 


Ur Leukocyte Esterase   


 


Urine RBC   


 


Urine WBC   


 


Urine WBC Clumps   


 


Amorphous Sediment   


 


Urine Bacteria   


 


Hyaline Casts   


 


Urine Mucus   


 


Serum Alcohol   


 


Ethyl Alcohol Screen   


 


Hep B Core Total Ab   


 


Crossmatch   














  05/27/20 05/27/20 05/27/20





  11:13 12:04 14:28


 


RBC   


 


Hgb   


 


Hct   


 


MCV   


 


MCHC   


 


RDW   


 


Plt Count   


 


Neutrophils #   


 


Lymphocytes # (Manual)   


 


Metamyelocytes # (Man)   


 


Nucleated RBCs   


 


Macrocytosis   


 


PT   


 


INR   


 


APTT   


 


ABG pH   


 


ABG pCO2   


 


ABG pO2   


 


ABG HCO3   


 


ABG Total CO2   


 


ABG O2 Saturation   


 


ABG Lactic Acid   


 


VBG pH   


 


VBG pCO2   


 


VBG HCO3   


 


Potassium   


 


Chloride   


 


Carbon Dioxide   


 


Creatinine   


 


Glucose   


 


POC Glucose (mg/dL)  155 H  165 H  107 H


 


Osmolality   


 


Plasma Lactic Acid Scott   


 


Calcium   


 


Total Bilirubin   


 


AST   


 


ALT   


 


Alkaline Phosphatase   


 


Ammonia   


 


Creatine Kinase   


 


Total Protein   


 


Albumin   


 


Urine Appearance   


 


Urine Protein   


 


Urine Glucose (UA)   


 


Urine Blood   


 


Ur Leukocyte Esterase   


 


Urine RBC   


 


Urine WBC   


 


Urine WBC Clumps   


 


Amorphous Sediment   


 


Urine Bacteria   


 


Hyaline Casts   


 


Urine Mucus   


 


Serum Alcohol   


 


Ethyl Alcohol Screen   


 


Hep B Core Total Ab   


 


Crossmatch   














  05/27/20 05/27/20





  16:16 20:00


 


RBC  


 


Hgb  


 


Hct  


 


MCV  


 


MCHC  


 


RDW  


 


Plt Count  


 


Neutrophils #  


 


Lymphocytes # (Manual)  


 


Metamyelocytes # (Man)  


 


Nucleated RBCs  


 


Macrocytosis  


 


PT  


 


INR  


 


APTT  


 


ABG pH  


 


ABG pCO2  


 


ABG pO2  


 


ABG HCO3  


 


ABG Total CO2  


 


ABG O2 Saturation  


 


ABG Lactic Acid  


 


VBG pH  


 


VBG pCO2  


 


VBG HCO3  


 


Potassium  


 


Chloride  


 


Carbon Dioxide  


 


Creatinine  


 


Glucose  


 


POC Glucose (mg/dL)  102 H  118 H


 


Osmolality  


 


Plasma Lactic Acid Scott  


 


Calcium  


 


Total Bilirubin  


 


AST  


 


ALT  


 


Alkaline Phosphatase  


 


Ammonia  


 


Creatine Kinase  


 


Total Protein  


 


Albumin  


 


Urine Appearance  


 


Urine Protein  


 


Urine Glucose (UA)  


 


Urine Blood  


 


Ur Leukocyte Esterase  


 


Urine RBC  


 


Urine WBC  


 


Urine WBC Clumps  


 


Amorphous Sediment  


 


Urine Bacteria  


 


Hyaline Casts  


 


Urine Mucus  


 


Serum Alcohol  


 


Ethyl Alcohol Screen  


 


Hep B Core Total Ab  


 


Crossmatch

## 2020-05-27 NOTE — PN
PROGRESS NOTE



PULMONARY/CRITICAL CARE PROGRESS NOTE:



DATE OF SERVICE:

05/27/2020



CRITICAL CARE TIME:  32 minutes.



This is a 49-year-old black female who had an in-hospital cardiac arrest.  She was

admitted on May 23.  The arrest occurred on the same day and apparently according to

the respiratory therapist, was brief.  She apparently was going for a CAT scan

procedure.  Anyway, the patient has a history of respiratory failure, urinary tract

infection, and sepsis.  She was intubated on 5/23.  She remains on the mechanical

ventilator.  Her vent settings include the volume assist-control mode rate of 16, tidal

volume 400, FiO2 30% to be dropped to 25%, and PEEP of 5.  Arterial blood gases show

pO2 of 112, pCO2 of 42, and pH of 7.48.  This is consistent with a metabolic alkalosis.

She is getting saline at 10 mL an hour, insulin at 1 unit an hour and tube feeds with

Vital AF at 50 with a goal of 58 mL an hour.  Her urine was positive for Escherichia

coli.  Neuro has yet to see her.  EEG has not yet been done.  Today, we are going to do

a daily interruption of sedation.  She has been off the propofol since yesterday.  We

are going to place her on PSV 10, CPAP of 5 for spontaneous breathing trial.  The FiO2

was dropped down to 25%.



Current vital signs are reviewed. Temperature is 97.6, heart rate 90, respiratory rate

16, blood pressure 111/69 mean 83 and saturations are 100%.  Appears in no acute

distress.

HEENT: Examination is grossly unremarkable.  There is no orally placed endotracheal

tube and NG tube.

NECK:  Supple.  Full range of motion.

CARDIOVASCULAR: Examination reveals regular rhythm and rate.  Heart rate 90 beats per

minute.  S1, S2 normal.

LUNGS:  Reveal some coarse bilateral rhonchi.  No wheezes.  No crackles.

ABDOMEN:  Soft, bowel sounds are noted.

EXTREMITIES:  Intact.  Slight edema.

SKIN: Without rash.

NEUROLOGIC: Examination is difficult to assess as she is poorly responsive.  This may

indicate some anoxic brain injury.



LABS:

Reviewed.  Her white count 7.5, hemoglobin 9.7, hematocrit 30.1, platelet count is

39,000, blood gases have been noted.



Sodium 137, potassium 3.4, chloride is 101, CO2 is 33, anion gap is 3. BUN and

creatinine were 13 and 2.53.  Her urine was positive for urinary tract infection.  She

does have E. coli in her urine.  Her initial serum alcohol was 274 and her ethanol

level was 191.  Tylenol level was 11.5, salicylate level was 1.0.  Drug screen was

otherwise negative.  COVID testing was negative.  Again, urine was positive for E coli

on May 23.  Chest x-ray shows low lung volumes with cardiomegaly and a small right

pleural effusion.  The radiologist also mentioned the possibility of peripheral left

mid lung infiltrate.



Medications were reviewed yesterday as they were today.  Unnecessary medications were

discontinued.  The patient remains on Zosyn as an antibiotic.



ASSESSMENT:

1. Acute hypoxemic respiratory failure, secondary to septic shock and possible

    ischemic colitis and/or a suspected intraabdominal source of sepsis.

2. Escherichia coli urinary tract infection with sepsis.

3. Acute blood loss anemia.

4. Acute thrombocytopenia secondary to sepsis.

5. Shocked liver secondary to hypotension and sepsis.

6. Mental status changes with suspected hepatic encephalopathy.

7. Acute kidney injury/acute tubular necrosis.

8. Severe anion gap metabolic acidosis, resolved.

9. Acute lactic acidemia.

10.Acute alcohol intoxication.

11.Rule out anoxic brain injury.

12.Status post cardiopulmonary arrest on May 23.



PLAN:

The patient will be given a trial of PSV and CPAP.  Neurology is yet to see the

patient.  FiO2 was dropped to 25%.  She has been off propofol.  This happened

yesterday.  Her red mental status is not particularly good.  Additional recommendations

and suggestions are forthcoming.  She is getting nourished.  Will increase tube feeds

to goal.  Prognosis is poor.



CRITICAL CARE TIME: 32 minutes.





MMMARYL / NANIN: [id number] / Job#: [id number]

## 2020-05-28 LAB
ALBUMIN SERPL-MCNC: 2.1 G/DL (ref 3.5–5)
ALP SERPL-CCNC: 209 U/L (ref 38–126)
ALT SERPL-CCNC: 73 U/L (ref 4–34)
ANION GAP SERPL CALC-SCNC: 7 MMOL/L
AST SERPL-CCNC: 210 U/L (ref 14–36)
BASOPHILS # BLD AUTO: 0 K/UL (ref 0–0.2)
BASOPHILS NFR BLD AUTO: 0 %
BUN SERPL-SCNC: 14 MG/DL (ref 7–17)
CALCIUM SPEC-MCNC: 7.6 MG/DL (ref 8.4–10.2)
CHLORIDE SERPL-SCNC: 106 MMOL/L (ref 98–107)
CO2 BLDA-SCNC: 25 MMOL/L (ref 19–24)
CO2 SERPL-SCNC: 24 MMOL/L (ref 22–30)
EOSINOPHIL # BLD AUTO: 0.2 K/UL (ref 0–0.7)
EOSINOPHIL NFR BLD AUTO: 3 %
ERYTHROCYTE [DISTWIDTH] IN BLOOD BY AUTOMATED COUNT: 3.2 M/UL (ref 3.8–5.4)
ERYTHROCYTE [DISTWIDTH] IN BLOOD: 19.8 % (ref 11.5–15.5)
GLUCOSE BLD-MCNC: 113 MG/DL (ref 75–99)
GLUCOSE BLD-MCNC: 119 MG/DL (ref 75–99)
GLUCOSE BLD-MCNC: 126 MG/DL (ref 75–99)
GLUCOSE BLD-MCNC: 127 MG/DL (ref 75–99)
GLUCOSE SERPL-MCNC: 118 MG/DL (ref 74–99)
HCO3 BLDA-SCNC: 24 MMOL/L (ref 21–25)
HCT VFR BLD AUTO: 30.9 % (ref 34–46)
HGB BLD-MCNC: 9.6 GM/DL (ref 11.4–16)
LYMPHOCYTES # SPEC AUTO: 1.9 K/UL (ref 1–4.8)
LYMPHOCYTES NFR SPEC AUTO: 30 %
MCH RBC QN AUTO: 29.9 PG (ref 25–35)
MCHC RBC AUTO-ENTMCNC: 31 G/DL (ref 31–37)
MCV RBC AUTO: 96.6 FL (ref 80–100)
MONOCYTES # BLD AUTO: 0.5 K/UL (ref 0–1)
MONOCYTES NFR BLD AUTO: 9 %
NEUTROPHILS # BLD AUTO: 3.4 K/UL (ref 1.3–7.7)
NEUTROPHILS NFR BLD AUTO: 54 %
PCO2 BLDA: 33 MMHG (ref 35–45)
PH BLDA: 7.47 [PH] (ref 7.35–7.45)
PLATELET # BLD AUTO: 32 K/UL (ref 150–450)
PO2 BLDA: 99 MMHG (ref 83–108)
POTASSIUM SERPL-SCNC: 2.7 MMOL/L (ref 3.5–5.1)
PROT SERPL-MCNC: 5.1 G/DL (ref 6.3–8.2)
SODIUM SERPL-SCNC: 137 MMOL/L (ref 137–145)
WBC # BLD AUTO: 6.2 K/UL (ref 3.8–10.6)

## 2020-05-28 RX ADMIN — RIFAXIMIN SCH MG: 550 TABLET ORAL at 20:27

## 2020-05-28 RX ADMIN — POTASSIUM CHLORIDE SCH MLS/HR: 14.9 INJECTION, SOLUTION INTRAVENOUS at 12:31

## 2020-05-28 RX ADMIN — LACTULOSE SCH GM: 20 SOLUTION ORAL at 11:20

## 2020-05-28 RX ADMIN — RIFAXIMIN SCH MG: 550 TABLET ORAL at 11:20

## 2020-05-28 RX ADMIN — PIPERACILLIN AND TAZOBACTAM SCH MLS/HR: 3; .375 INJECTION, POWDER, FOR SOLUTION INTRAVENOUS at 17:51

## 2020-05-28 RX ADMIN — LACTULOSE SCH GM: 20 SOLUTION ORAL at 20:27

## 2020-05-28 RX ADMIN — MIDODRINE HYDROCHLORIDE SCH MG: 5 TABLET ORAL at 21:19

## 2020-05-28 RX ADMIN — PANTOPRAZOLE SODIUM SCH MG: 40 INJECTION, POWDER, FOR SOLUTION INTRAVENOUS at 10:15

## 2020-05-28 RX ADMIN — LACTULOSE SCH: 20 SOLUTION ORAL at 10:15

## 2020-05-28 RX ADMIN — POTASSIUM BICARBONATE SCH MEQ: 782 TABLET, EFFERVESCENT ORAL at 05:53

## 2020-05-28 RX ADMIN — PIPERACILLIN AND TAZOBACTAM SCH MLS/HR: 3; .375 INJECTION, POWDER, FOR SOLUTION INTRAVENOUS at 10:15

## 2020-05-28 RX ADMIN — POTASSIUM BICARBONATE SCH MEQ: 782 TABLET, EFFERVESCENT ORAL at 05:27

## 2020-05-28 RX ADMIN — DEXMEDETOMIDINE HYDROCHLORIDE SCH MLS/HR: 4 INJECTION, SOLUTION INTRAVENOUS at 03:04

## 2020-05-28 RX ADMIN — CEFAZOLIN SCH MLS/HR: 330 INJECTION, POWDER, FOR SOLUTION INTRAMUSCULAR; INTRAVENOUS at 20:28

## 2020-05-28 RX ADMIN — METRONIDAZOLE SCH MLS/HR: 500 INJECTION, SOLUTION INTRAVENOUS at 10:15

## 2020-05-28 RX ADMIN — POTASSIUM CHLORIDE SCH MLS/HR: 14.9 INJECTION, SOLUTION INTRAVENOUS at 10:19

## 2020-05-28 RX ADMIN — CHLORHEXIDINE GLUCONATE SCH: 1.2 RINSE ORAL at 10:10

## 2020-05-28 NOTE — PN
PROGRESS NOTE



DATE OF SERVICE:

05/28/2020



CHIEF COMPLAINT:

Cardiorespiratory arrest with anoxic brain injury and renal failure.



HISTORY OF PRESENT ILLNESS:

This lady was not able to be extubated yesterday.  She still remains fairly obtunded.



PHYSICAL EXAMINATION:

Her blood pressure is normal.  She is moving about somewhat, but not purposefully.  The

chest is clear.  The cardiac exam is normal.  Abdomen is soft.  There are no masses.

Extremities are normal.



IMPRESSION:

1. Cardiorespiratory arrest.

2. Anoxic encephalopathy.

3. Renal failure.



PLAN:

Continue to follow with Intensive Medicine, Neurology and Med/Renal.





MMODL / IJN: [id number] / Job#: [id number]

## 2020-05-28 NOTE — P.PN
Subjective


Progress Note Date: 05/28/20


Principal diagnosis: 





Difficulty weaning off sedation





Subjective: This is a follow-up note from the 27th for a 49-year-old female who 

presented in PE a in the emergency room on May 23 and underwent 2 rounds of CPR.

 The patient was found to have alcohol toxicity and had taken trazodone 150 mg 

in conjunction.





Over the interim the patient has had a good response to being weaned off and is 

now extubated.  She is oriented to time place person.  Her speech is fluent.  

Her discussion with nursing there are no concerns neurologically at this time.





Objective





- Vital Signs


Vital signs: 


                                   Vital Signs











Temp  96.5 F L  05/28/20 12:00


 


Pulse  65   05/28/20 19:00


 


Resp  16   05/28/20 19:00


 


BP  82/49   05/28/20 18:30


 


Pulse Ox  97   05/28/20 19:00








                                 Intake & Output











 05/28/20 05/28/20 05/29/20





 06:59 18:59 06:59


 


Intake Total 707.782 9654.843 13


 


Output Total 2690 665 12


 


Balance -2171.135 546.843 1


 


Weight 91.2 kg  


 


Intake:   


 


   1153 13


 


    Piperacillin-Tazobactam 3 100 100 





    .375 gm In Sodium   





    Chloride 0.9% 100 ml @ 25   





    mls/hr IVPB Q12H PATRICIA Rx#   





    :[id number]   


 


    Piperacillin-Tazobactam 3  200 





    .375 gm In Sodium   





    Chloride 0.9% 100 ml @ 25   





    mls/hr IVPB Q8HR PATRICIA Rx#   





    :[id number]   


 


    Potassium Chloride 20 meq  100 





    In Water For Injection 1   





    100ml.bag @ 50 mls/hr   





    IVPB Q2H PATRICIA Rx#:   





    [id number]   


 


    Sodium Chloride 0.9% 1, 130 120 10





    000 ml @ 10 mls/hr IV .   





    Q24H PATRICIA Rx#:[id number]   


 


    Sodium Chloride 0.9% 500  500 





    ml 500 ml @ 999 mls/hr IV   





    .Q31M ONE Rx#:[id number]   


 


    metroNIDAZOLE-NS  100 100 





    mg In Saline 1 100ml.bag   





    @ 100 mls/hr IVPB TID PATRICIA   





    Rx#:[id number]   


 


    pressure bag 39 33 3


 


  Intake, IV Titration 79.865 58.843 





  Amount   


 


    Dexmedetomidine/0.9% NaCl 75.731 58.843 





    (Pmx) 400 mcg In Empty   





    Bag 1 bag @ Titrate IV .   





    Q0M PATRICIA Rx#:[id number]   


 


    Norepinephrine 32 mg In 4.134  





    Sodium Chloride 0.9% 218   





    ml @ 0.02 MCG/KG/MIN 0.   





    854 mls/hr IV .Q24H PATRICIA   





    Rx#:[id number]   


 


  Tube Feeding 70  


 


Output:   


 


  Gastric Drainage 150 150 


 


  Urine 340 215 12


 


  Stool 200 300 


 


  Hemodialysis 2000  


 


Other:   


 


  Voiding Method Indwelling Catheter Indwelling Catheter 


 


  # Voids  0 








                       ABP, PAP, CO, CI - Last Documented











Arterial Blood Pressure        97/45

















- Exam





Examination: Next line mental status: Patient drowsy but able to respond 

appropriately answer questions appropriate.  Her speech is slow but fluent.  

Next line pupils: 2 mm equally reactive to light and accommodation.  Next line 

cranial nerves: Patient tracks well there is no nystagmus noted on vertical 

horizontal gaze.  Her face appears symmetric.  Tongue appears midline without fa

sciculations deviation.


Motor examination patient has generalized weakness and hypotonia throughout.  

She is able to move all 4 extremities equally.  Strength testing was difficulty 

due to patient's fatigue. 


Coordination testing deferred


Deep tendon reflexes deferred


Sensory examination grossly intact to light touch throughout.














- Labs


CBC & Chem 7: 


                                 05/28/20 04:00





                                 05/28/20 16:29


Labs: 


                  Abnormal Lab Results - Last 24 Hours (Table)











  05/27/20 05/27/20 05/28/20 Range/Units





  20:00 23:25 03:44 


 


RBC     (3.80-5.40)  m/uL


 


Hgb     (11.4-16.0)  gm/dL


 


Hct     (34.0-46.0)  %


 


RDW     (11.5-15.5)  %


 


Plt Count     (150-450)  k/uL


 


ABG pH     (7.35-7.45)  


 


ABG pCO2     (35-45)  mmHg


 


ABG Total CO2     (19-24)  mmol/L


 


ABG O2 Saturation     (94-97)  %


 


Potassium     (3.5-5.1)  mmol/L


 


Creatinine     (0.52-1.04)  mg/dL


 


Glucose     (74-99)  mg/dL


 


POC Glucose (mg/dL)  118 H  116 H  113 H  (75-99)  mg/dL


 


Calcium     (8.4-10.2)  mg/dL


 


Total Bilirubin     (0.2-1.3)  mg/dL


 


AST     (14-36)  U/L


 


ALT     (4-34)  U/L


 


Alkaline Phosphatase     ()  U/L


 


Ammonia     (<30)  umol/L


 


Total Protein     (6.3-8.2)  g/dL


 


Albumin     (3.5-5.0)  g/dL














  05/28/20 05/28/20 05/28/20 Range/Units





  04:00 04:00 04:40 


 


RBC  3.20 L    (3.80-5.40)  m/uL


 


Hgb  9.6 L    (11.4-16.0)  gm/dL


 


Hct  30.9 L    (34.0-46.0)  %


 


RDW  19.8 H    (11.5-15.5)  %


 


Plt Count  32 L    (150-450)  k/uL


 


ABG pH    7.47 H  (7.35-7.45)  


 


ABG pCO2    33 L  (35-45)  mmHg


 


ABG Total CO2    25 H  (19-24)  mmol/L


 


ABG O2 Saturation    97.6 H  (94-97)  %


 


Potassium   2.7 L*   (3.5-5.1)  mmol/L


 


Creatinine   2.07 H   (0.52-1.04)  mg/dL


 


Glucose   118 H   (74-99)  mg/dL


 


POC Glucose (mg/dL)     (75-99)  mg/dL


 


Calcium   7.6 L   (8.4-10.2)  mg/dL


 


Total Bilirubin   2.2 H   (0.2-1.3)  mg/dL


 


AST   210 H   (14-36)  U/L


 


ALT   73 H   (4-34)  U/L


 


Alkaline Phosphatase   209 H   ()  U/L


 


Ammonia     (<30)  umol/L


 


Total Protein   5.1 L   (6.3-8.2)  g/dL


 


Albumin   2.1 L   (3.5-5.0)  g/dL














  05/28/20 05/28/20 05/28/20 Range/Units





  07:34 07:35 09:15 


 


RBC     (3.80-5.40)  m/uL


 


Hgb     (11.4-16.0)  gm/dL


 


Hct     (34.0-46.0)  %


 


RDW     (11.5-15.5)  %


 


Plt Count     (150-450)  k/uL


 


ABG pH     (7.35-7.45)  


 


ABG pCO2     (35-45)  mmHg


 


ABG Total CO2     (19-24)  mmol/L


 


ABG O2 Saturation     (94-97)  %


 


Potassium   3.0 L   (3.5-5.1)  mmol/L


 


Creatinine     (0.52-1.04)  mg/dL


 


Glucose     (74-99)  mg/dL


 


POC Glucose (mg/dL)  127 H    (75-99)  mg/dL


 


Calcium     (8.4-10.2)  mg/dL


 


Total Bilirubin     (0.2-1.3)  mg/dL


 


AST     (14-36)  U/L


 


ALT     (4-34)  U/L


 


Alkaline Phosphatase     ()  U/L


 


Ammonia    64 H  (<30)  umol/L


 


Total Protein     (6.3-8.2)  g/dL


 


Albumin     (3.5-5.0)  g/dL














  05/28/20 Range/Units





  16:28 


 


RBC   (3.80-5.40)  m/uL


 


Hgb   (11.4-16.0)  gm/dL


 


Hct   (34.0-46.0)  %


 


RDW   (11.5-15.5)  %


 


Plt Count   (150-450)  k/uL


 


ABG pH   (7.35-7.45)  


 


ABG pCO2   (35-45)  mmHg


 


ABG Total CO2   (19-24)  mmol/L


 


ABG O2 Saturation   (94-97)  %


 


Potassium   (3.5-5.1)  mmol/L


 


Creatinine   (0.52-1.04)  mg/dL


 


Glucose   (74-99)  mg/dL


 


POC Glucose (mg/dL)  126 H  (75-99)  mg/dL


 


Calcium   (8.4-10.2)  mg/dL


 


Total Bilirubin   (0.2-1.3)  mg/dL


 


AST   (14-36)  U/L


 


ALT   (4-34)  U/L


 


Alkaline Phosphatase   ()  U/L


 


Ammonia   (<30)  umol/L


 


Total Protein   (6.3-8.2)  g/dL


 


Albumin   (3.5-5.0)  g/dL








                      Microbiology - Last 24 Hours (Table)











 05/23/20 15:36 Blood Culture - Preliminary





 Blood    No Growth after 120 hours














Assessment and Plan


Assessment: 





This is a 49-year-old female who underwent acute respiratory failure requiring 2

rounds of CPR.  Etiology for this was due to alcohol intoxication.  The patient 

is now been successfully extubated and she was able to respond appropriately 

during examination.  Her overall neurological exam was limited due to her 

fatigue.  No other concerns for nursing for possible seizure activity or other 

concerns.





At this time neurology will sign off until further notice.  This patient should 

improve daily with her strength in mentation.  Would strongly recommend a PT OT 

and speech evaluation along with psychiatry evaluation for alcohol addiction.  

It is now 5 days since admission and unlikely this time patient would undergo 

alcohol withdrawal.  Would recommend however thiamine level and close 

monitoring.





Thank you for this consultation.  If very further questions or concerns please 

reconsult neurology.  I believe overall this patient's prognosis is good 

providing she gets supportive care with psychiatry for alcohol recovery period








Tiesha Ramos M.D.


Board Certified in Neurology and Sleep Medicine

## 2020-05-28 NOTE — XR
EXAMINATION TYPE: XR chest 1V portable

 

DATE OF EXAM: 5/28/2020

 

COMPARISON: 5/27/2020

 

HISTORY: Ventilatory dependent respiratory failure.

 

TECHNIQUE: Single frontal view of the chest is obtained.

 

FINDINGS:  Endotracheal tube and external tubing overlapping. Distal tip of the endotracheal tube wit
hin the trachea appears that the distal tip of the clavicles unchanged from the prior. Enteric tube c
ourses off the distal field-of-view. Peripheral patchy opacities are similar to the prior. Trace righ
t pleural effusion. Cardiac loop recorder seen. No new pneumothorax. Stable cardiomediastinal silhoue
tte size.

 

IMPRESSION:  Stable patchy peripheral opacities of the lower lungs and trace right pleural effusion. 
Multifocal pneumonia is of primary consideration.

## 2020-05-28 NOTE — P.PN
Subjective





Patient is seen in follow for acute kidney injury.  Started on hemodialysis on 

May 25.  Urine output has improved a little.  She is now making 20-40 mL an hour

of urine.  Currently intubated on 25% FiO2.  She is maintained on tube feeding. 

Tolerated dialysis well yesterday with 2 L ultrafiltration.





Vital signs are stable.


General: The patient appeared well nourished and normally developed. 


HEENT: Head exam is unremarkable. Neck is without jugular venous distension.  

Intubated.


LUNGS: Lungs are clear to auscultation and percussion. Breath sounds decreased.


HEART: Rate and Rhythm are regular. 


ABDOMEN: Soft, nondistended.


EXTREMITITES: Trace edema.





Objective





- Vital Signs


Vital signs: 


                                   Vital Signs











Temp  95.5 F L  05/28/20 08:30


 


Pulse  46 L  05/28/20 08:30


 


Resp  16   05/28/20 08:30


 


BP  120/83   05/28/20 08:30


 


Pulse Ox  99   05/28/20 08:30








                                 Intake & Output











 05/27/20 05/28/20 05/28/20





 18:59 06:59 18:59


 


Intake Total 1217.345 518.865 94.843


 


Output Total 1032 2690 140


 


Balance 185.345 -2171.135 -45.157


 


Weight 91.1 kg 91.2 kg 


 


Intake:   


 


   369 36


 


    Piperacillin-Tazobactam 3 100 100 





    .375 gm In Sodium   





    Chloride 0.9% 100 ml @ 25   





    mls/hr IVPB Q12H [facility] Rx#   





    :[id number]   


 


    Sodium Chloride 0.9% 1, 530 130 30





    000 ml @ 10 mls/hr IV .   





    Q24H [facility] Rx#:[id number]   


 


    metroNIDAZOLE-NS  100 100 





    mg In Saline 1 100ml.bag   





    @ 100 mls/hr IVPB TID [facility]   





    Rx#:[id number]   


 


    pressure bag 36 39 6


 


  Intake, IV Titration 9.345 79.865 58.843





  Amount   


 


    Dexmedetomidine/0.9% NaCl  75.731 58.843





    (Pmx) 400 mcg In Empty   





    Bag 1 bag @ Titrate IV .   





    Q0M [facility] Rx#:[id number]   


 


    Insulin Regular 100 unit 8.989  





    In Sodium Chloride 0.9%   





    100 ml @ Per Protocol IV   





    .Q0M [facility] Rx#:[id number]   


 


    Norepinephrine 32 mg In 0.356 4.134 





    Sodium Chloride 0.9% 218   





    ml @ 0.02 MCG/KG/MIN 0.   





    854 mls/hr IV .Q24H [facility]   





    Rx#:[id number]   


 


  Tube Feeding 382 70 


 


  Other 60  


 


Output:   


 


  Gastric Drainage  150 


 


  Urine 132 340 40


 


  Stool 500 200 100


 


  Emesis 400  


 


  Hemodialysis  2000 


 


Other:   


 


  Voiding Method Indwelling Catheter Indwelling Catheter Indwelling Catheter


 


  # Voids 0  0








                       ABP, PAP, CO, CI - Last Documented











Arterial Blood Pressure        127/69

















- Labs


CBC & Chem 7: 


                                 05/28/20 04:00





                                 05/28/20 07:35


Labs: 


                  Abnormal Lab Results - Last 24 Hours (Table)











  05/27/20 05/27/20 05/27/20 Range/Units





  09:38 11:13 12:04 


 


RBC     (3.80-5.40)  m/uL


 


Hgb     (11.4-16.0)  gm/dL


 


Hct     (34.0-46.0)  %


 


RDW     (11.5-15.5)  %


 


Plt Count     (150-450)  k/uL


 


ABG pH  7.50 H    (7.35-7.45)  


 


ABG pCO2     (35-45)  mmHg


 


ABG pO2  77 L    ()  mmHg


 


ABG HCO3  31 H    (21-25)  mmol/L


 


ABG Total CO2  33 H    (19-24)  mmol/L


 


ABG O2 Saturation     (94-97)  %


 


Potassium     (3.5-5.1)  mmol/L


 


Creatinine     (0.52-1.04)  mg/dL


 


Glucose     (74-99)  mg/dL


 


POC Glucose (mg/dL)   155 H  165 H  (75-99)  mg/dL


 


Calcium     (8.4-10.2)  mg/dL


 


Total Bilirubin     (0.2-1.3)  mg/dL


 


AST     (14-36)  U/L


 


ALT     (4-34)  U/L


 


Alkaline Phosphatase     ()  U/L


 


Total Protein     (6.3-8.2)  g/dL


 


Albumin     (3.5-5.0)  g/dL














  05/27/20 05/27/20 05/27/20 Range/Units





  14:28 16:16 20:00 


 


RBC     (3.80-5.40)  m/uL


 


Hgb     (11.4-16.0)  gm/dL


 


Hct     (34.0-46.0)  %


 


RDW     (11.5-15.5)  %


 


Plt Count     (150-450)  k/uL


 


ABG pH     (7.35-7.45)  


 


ABG pCO2     (35-45)  mmHg


 


ABG pO2     ()  mmHg


 


ABG HCO3     (21-25)  mmol/L


 


ABG Total CO2     (19-24)  mmol/L


 


ABG O2 Saturation     (94-97)  %


 


Potassium     (3.5-5.1)  mmol/L


 


Creatinine     (0.52-1.04)  mg/dL


 


Glucose     (74-99)  mg/dL


 


POC Glucose (mg/dL)  107 H  102 H  118 H  (75-99)  mg/dL


 


Calcium     (8.4-10.2)  mg/dL


 


Total Bilirubin     (0.2-1.3)  mg/dL


 


AST     (14-36)  U/L


 


ALT     (4-34)  U/L


 


Alkaline Phosphatase     ()  U/L


 


Total Protein     (6.3-8.2)  g/dL


 


Albumin     (3.5-5.0)  g/dL














  05/27/20 05/28/20 05/28/20 Range/Units





  23:25 03:44 04:00 


 


RBC    3.20 L  (3.80-5.40)  m/uL


 


Hgb    9.6 L  (11.4-16.0)  gm/dL


 


Hct    30.9 L  (34.0-46.0)  %


 


RDW    19.8 H  (11.5-15.5)  %


 


Plt Count    32 L  (150-450)  k/uL


 


ABG pH     (7.35-7.45)  


 


ABG pCO2     (35-45)  mmHg


 


ABG pO2     ()  mmHg


 


ABG HCO3     (21-25)  mmol/L


 


ABG Total CO2     (19-24)  mmol/L


 


ABG O2 Saturation     (94-97)  %


 


Potassium     (3.5-5.1)  mmol/L


 


Creatinine     (0.52-1.04)  mg/dL


 


Glucose     (74-99)  mg/dL


 


POC Glucose (mg/dL)  116 H  113 H   (75-99)  mg/dL


 


Calcium     (8.4-10.2)  mg/dL


 


Total Bilirubin     (0.2-1.3)  mg/dL


 


AST     (14-36)  U/L


 


ALT     (4-34)  U/L


 


Alkaline Phosphatase     ()  U/L


 


Total Protein     (6.3-8.2)  g/dL


 


Albumin     (3.5-5.0)  g/dL














  05/28/20 05/28/20 05/28/20 Range/Units





  04:00 04:40 07:34 


 


RBC     (3.80-5.40)  m/uL


 


Hgb     (11.4-16.0)  gm/dL


 


Hct     (34.0-46.0)  %


 


RDW     (11.5-15.5)  %


 


Plt Count     (150-450)  k/uL


 


ABG pH   7.47 H   (7.35-7.45)  


 


ABG pCO2   33 L   (35-45)  mmHg


 


ABG pO2     ()  mmHg


 


ABG HCO3     (21-25)  mmol/L


 


ABG Total CO2   25 H   (19-24)  mmol/L


 


ABG O2 Saturation   97.6 H   (94-97)  %


 


Potassium  2.7 L*    (3.5-5.1)  mmol/L


 


Creatinine  2.07 H    (0.52-1.04)  mg/dL


 


Glucose  118 H    (74-99)  mg/dL


 


POC Glucose (mg/dL)    127 H  (75-99)  mg/dL


 


Calcium  7.6 L    (8.4-10.2)  mg/dL


 


Total Bilirubin  2.2 H    (0.2-1.3)  mg/dL


 


AST  210 H    (14-36)  U/L


 


ALT  73 H    (4-34)  U/L


 


Alkaline Phosphatase  209 H    ()  U/L


 


Total Protein  5.1 L    (6.3-8.2)  g/dL


 


Albumin  2.1 L    (3.5-5.0)  g/dL














  05/28/20 Range/Units





  07:35 


 


RBC   (3.80-5.40)  m/uL


 


Hgb   (11.4-16.0)  gm/dL


 


Hct   (34.0-46.0)  %


 


RDW   (11.5-15.5)  %


 


Plt Count   (150-450)  k/uL


 


ABG pH   (7.35-7.45)  


 


ABG pCO2   (35-45)  mmHg


 


ABG pO2   ()  mmHg


 


ABG HCO3   (21-25)  mmol/L


 


ABG Total CO2   (19-24)  mmol/L


 


ABG O2 Saturation   (94-97)  %


 


Potassium  3.0 L  (3.5-5.1)  mmol/L


 


Creatinine   (0.52-1.04)  mg/dL


 


Glucose   (74-99)  mg/dL


 


POC Glucose (mg/dL)   (75-99)  mg/dL


 


Calcium   (8.4-10.2)  mg/dL


 


Total Bilirubin   (0.2-1.3)  mg/dL


 


AST   (14-36)  U/L


 


ALT   (4-34)  U/L


 


Alkaline Phosphatase   ()  U/L


 


Total Protein   (6.3-8.2)  g/dL


 


Albumin   (3.5-5.0)  g/dL








                      Microbiology - Last 24 Hours (Table)











 05/23/20 15:36 Blood Culture - Preliminary





 Blood    No Growth after 96 hours














Assessment and Plan


Plan: 





Assessment:


1.  Acute kidney injury secondary to ATN secondary to septic shock.  Creatinine 

peaked at 4 this admission.  CAT scan revealed fullness of the collecting 

systems.  No hydronephrosis noted on renal ultrasound.  Unknown baseline renal 

function.  Started on hemodialysis on May 25.


2.  Hyperkalemia secondary to acute kidney injury, metabolic acidosis and GI 

bleed.  Improved with medical management.  Now hypokalemic, being replaced.


3.  Severe metabolic acidosis secondary to acute kidney injury and lactic 

acidosis. Volatile screen negative.  Resolved. 


4.  Hypocalcemia secondary to acute kidney injury.  Corrected calcium normal.


5.  Acute GI bleed status post blood transfusion and IV DDAVP.  GI following.  


6.  UTI with urine culture positive for E. coli maintain on antibiotics.


7. ? Ischemic colitis.


8.  Alcohol-induced liver cirrhosis.





Plan:


Maintain tube feeds.


Lasix 80 mg IV once today.


Wean FiO2.


Follow-up cultures.


Continue to monitor renal function and urine output closely.


She has undergone dialysis last 3 days in a row.  I will hold today and reassess

again tomorrow.

## 2020-05-28 NOTE — PN
PROGRESS NOTE



DATE OF DICTATION:

05/27/2020



This patient is a 49-year-old white female with history of heavy alcoholism admitted to

the hospital with acute respiratory failure and altered mental status. She was noted to

have severe hepatic encephalopathy and has been on oral lactulose and Xifaxan since

then. She is doing much better over the last 2 days on lactulose and through the F[facility]

has about 300 mL of stool. Remains intubated and on the vent but off pressors.  She is

opening to eyes for simple commands.



PHYSICAL EXAMINATION:

On physical examination, appears comfortable, no apparent distress.

Vital signs are stable. Blood pressure 107/64, pulse rate is 69, temperature 97.6.

HEENT: Examination unremarkable. Conjunctivae pink. Sclerae anicteric. Oral cavity, no

lesions.

NECK: No JVD or lymph node enlargement.

CHEST: Clear to auscultation.

HEART:  Regular rate and rhythm.

ABDOMEN:  Soft, nontender, nondistended.  Bowel sounds are positive.  No organomegaly.

EXTREMITIES: No pedal edema.

NEURO:  Sedated and remains on the vent.



LABS:

Labs from today: WBC is 10.5, hemoglobin 9.7, platelets 39,000.  T-bilirubin 2.0, AST

261, ALT 67, alkaline phosphatase 189. Ammonia level yesterday was 23.



IMPRESSION:

1. Acute respiratory failure, remains on the vent and sedated.

2. Hypotension, resolved, off pressors for 2 days.

3. Hepatic encephalopathy, remains on Xifaxan and oral lactulose.  Ammonia level is

    normal.

4. Alcoholic liver disease with a component of acute alcoholic hepatitis, gradually

    improving.

5. Lactic acidosis, resolved.

6. Elevated BUN and creatinine, questionable chronic kidney injury versus acute kidney

    injury.  Nephrology following the patient.

7. Acute gastrointestinal bleed, resolved.  No further bleeding noted.



RECOMMENDATIONS:

1. Continue symptomatic and supportive care.

2. Continue Protonix 40 mg daily.

3. Monitor LFTs closely.

4. Continue Xifaxan as well as lactulose and titrate so that she has 3 soft bowel

    movements daily.

5. We will follow with you closely.

Thank you for this consultation.





MMODL / IJN: [id number] / Job#: [id number]

## 2020-05-28 NOTE — PN
PROGRESS NOTE



DATE OF SERVICE:

05/28/2020



CRITICAL CARE TIME:  33 minutes.



This is a 49-year-old black female who had an in-hospital cardiac arrest.  She was

admitted on 5/23.  She apparently was going for procedure or a scan and had a cardiac

arrest.  Apparently according to the respiratory therapist, it was relatively brief.

The patient does have a history of respiratory failure, urinary tract infection and

sepsis.  She was intubated on May 23.  Her vent settings include the volume assist-

control mode rate of 16, tidal volume 400, FiO2 25%, PEEP of 5.  Blood gases are

reasonable with a pO2 of 93, pCO2 of 33, and pH of 7.47.  She is getting saline at 10

mL an hour and Precedex has been turned off.  Tube feeds are on hold.  We are going to

trial her on PSV 5, CPAP of 5.  Obviously, we will do a daily interruption of sedation,

which is why the Precedex is off and a spontaneous breathing trial.  We hope to get her

extubated.  She was seen by Neurology.



Current vital signs are reviewed. Temperature is 95.5, heart rate 60, respiratory rate

12, blood pressure 96/49 mean 64, saturations are 99%.  Appears in no acute distress.

HEENT: Examination is grossly unremarkable.  Mucous membranes are moist.  There is no

orally placed endotracheal tube and NG tube.

NECK:  Supple.  Full range of motion.  No adenopathy.  Neck veins are flat.

CARDIOVASCULAR: Examination reveals regular rhythm rate.  Heart rate 60.  S1, S2

normal.

LUNGS:  A few scattered rhonchi.  Breath sounds equal.  No crackles.

ABDOMEN:  Soft. Bowel sounds are noted.

EXTREMITIES:  Intact.  No cyanosis, clubbing, or significant edema.

SKIN: Without rash.

NEUROLOGIC: Examination is difficult to assess as she is just currently still on

Precedex.  She is still sleepy.



LABS:

Reviewed.  White count 6.2, hemoglobin 9.6, hematocrit 30.9, platelet count 32,000.

Blood gases show a pO2 of 99, pCO2 of 33, pH of 7.47.  Sodium 137, potassium 2.7 up to

3 on recheck, chloride 106, CO2 is 24, anion gap is 7. BUN and creatinine were 14 and

2.07, albumin 2.1, alkaline phosphatase 209, , ALT 73.



Microbiology showing urine culture positive for Escherichia coli.



The most recent chest x-ray on Ms. Renae shows some patchy lower lung infiltrates or

atelectasis.



CURRENT MEDICATIONS:

Reviewed.



ASSESSMENT:

1. Acute hypoxemic respiratory failure, secondary to septic shock and possible

    ischemic colitis and/or suspected intraabdominal source of sepsis.

2. History of E coli urinary tract infection with sepsis.

3. Acute blood loss anemia.

4. Status post cardiopulmonary arrest with brief resuscitation, and intubation on May

    23.

5. Acute thrombocytopenia secondary to sepsis.

6. Shock liver secondary to hypotension and sepsis.

7. Mental status changes with suspected hepatic encephalopathy.

8. Acute kidney injury/acute tubular necrosis.

9. Severe anion gap metabolic acidosis, resolved.

10.Chronic kidney disease.

11.Acute lactic acidemia.

12.Acute alcohol intoxication.

13.Rule out anoxic brain injury.

14.Status post cardiopulmonary arrest on May 23, which required intubation.



PLAN:

The patient's Precedex will be turned off.  Will do a daily interruption of sedation

and spontaneous breathing trial.  If she has a good cuff leak and adequate weaning

parameters, will plan on extubating the patient.  Currently, her tube feeds are on hold

because of emesis yesterday.  The patient's blood gases are excellent.  Will continue

to follow closely.  Appreciate neurology input.  Prognosis is guarded.





CRITICAL CARE TIME:  33 minutes.





MMODL / IJN: [id number] / Job#: [id number]

## 2020-05-28 NOTE — PN
PROGRESS NOTE



DATE OF SERVICE:

05/28/2020



REASON FOR FOLLOWUP:

Possible ischemic colitis/aspiration pneumonia and UTI.



INTERVAL HISTORY:

The patient is currently afebrile.  The patient is off the pressor support.  The

patient has been extubated and is currently breathing comfortably on nasal cannula

oxygen.  The patient remains lethargic and no history was provided. No worsening

diarrhea has been reported by the nursing staff.



PHYSICAL EXAMINATION:

Her blood pressure is 99/45 with a pulse of 70, temperature 97.9. She is 95% on room

air.

General description is a middle-aged female lying in bed in no distress.

RESPIRATORY SYSTEM: Unlabored breathing. Clear to auscultation anteriorly.

HEART: S1, S2.  Regular rate and rhythm.

ABDOMEN: Soft. No distention.



LABS:

Hemoglobin 9.6, white count 6.2, BUN of 14, creatinine 2.07.



DIAGNOSTIC IMPRESSION AND PLAN:

Patient admitted to hospital with unresponsiveness.  Subsequently did have a cardiac

arrest with a CT suspicious for an ischemic colitis and possible aspiration

pneumonitis.  The patient did have E coli UTI as well.  The patient is covered with

Zosyn; to continue. Monitor her clinical course closely.  Continue with supportive

care.





MMODL / IJN: [id number] / Job#: [id number]

## 2020-05-28 NOTE — PN
PROGRESS NOTE



DATE OF SERVICE:

05/28/2020



Patient is a 49-year-old pleasant white female admitted to hospital with altered mental

status/acute respiratory failure.  She was intubated on the vent and she was just

extubated this morning.  Following extubation, she had a large amount of bilious emesis

and subsequently the tube feeds have been on hold.  She still has an NG tube in place.

She still is somewhat lethargic, but is opening eyes and responding to simple questions

appropriately.  She states that she is feeling very thirsty. Complains of some

abdominal pain.



PHYSICAL EXAMINATION:

Appears comfortable, lying in the bed.

VITAL SIGNS:  Stable.  Blood pressure is 86/50, pulse rate 68, and afebrile.

HEENT:  Examination unremarkable, conjunctivae are pink, sclerae nonicteric, oral

cavity no lesions.

NECK:  No JVD or lymph node enlargement.

CHEST:  Clear to auscultation.

ABDOMEN:  Soft, it was nontender.  Bowel sounds are positive.

EXTREMITIES:  No pedal edema.

SKIN:  No rashes.

NEUROLOGIC:  Awake, oriented to name, not to place and time.  NG tube in place which is

clamped, _____has about 200 mL of loose stool.



LABS:

WBC 6.2, hemoglobin 9.6, platelets 32,000.  The T bilirubin is 2.2, AST of 20, ALT 73,

alkaline phosphatase 209.



IMPRESSION:

1. Acute respiratory failure, status post extubation today.

2. Altered mental status secondary to hepatic encephalopathy.  The patient remains on

    lactulose and Xifaxan and doing better.

3. Nausea, vomiting, and hematemesis, which resolved. She had one episode of

    hematemesis at the time of admission to hospital, hemoglobin has been stable,

    around 9.6 g/dL.

4. Elevated LFTs and jaundice probably related to acute alcoholic hepatitis.  She had

    a component of acute ischemic hepatitis too because of prolonged hypertension.  At

    the time of admission to the hospital, LFTs are gradually improving.



RECOMMENDATION:

1. Continue to clamp the NG tube for today and start her with clear liquid diet

    tomorrow morning and see how she does.

2. Continue oral Xifaxan as well as oral lactulose and maintain and titrate to 3-4

    soft bowel movements daily.

3. Continue with Protonix 40 mg twice daily.

4. No plans on any endoscopy intervention.

5. Will follow with you closely.

Thank you for this consultation.





MMODL / IJN: [id number] / Job#: [id number]

## 2020-05-29 LAB
ALBUMIN SERPL-MCNC: 2 G/DL (ref 3.5–5)
ALP SERPL-CCNC: 246 U/L (ref 38–126)
ALT SERPL-CCNC: 83 U/L (ref 4–34)
ANION GAP SERPL CALC-SCNC: 6 MMOL/L
ANION GAP SERPL CALC-SCNC: 8 MMOL/L
AST SERPL-CCNC: 254 U/L (ref 14–36)
BUN SERPL-SCNC: 22 MG/DL (ref 7–17)
BUN SERPL-SCNC: 23 MG/DL (ref 7–17)
CALCIUM SPEC-MCNC: 7.7 MG/DL (ref 8.4–10.2)
CALCIUM SPEC-MCNC: 8.1 MG/DL (ref 8.4–10.2)
CELLS COUNTED: 200
CHLORIDE SERPL-SCNC: 108 MMOL/L (ref 98–107)
CHLORIDE SERPL-SCNC: 108 MMOL/L (ref 98–107)
CO2 SERPL-SCNC: 23 MMOL/L (ref 22–30)
CO2 SERPL-SCNC: 24 MMOL/L (ref 22–30)
ERYTHROCYTE [DISTWIDTH] IN BLOOD BY AUTOMATED COUNT: 2.89 M/UL (ref 3.8–5.4)
ERYTHROCYTE [DISTWIDTH] IN BLOOD: 20.1 % (ref 11.5–15.5)
GLUCOSE BLD-MCNC: 111 MG/DL (ref 75–99)
GLUCOSE BLD-MCNC: 182 MG/DL (ref 75–99)
GLUCOSE BLD-MCNC: 80 MG/DL (ref 75–99)
GLUCOSE BLD-MCNC: 95 MG/DL (ref 75–99)
GLUCOSE SERPL-MCNC: 102 MG/DL (ref 74–99)
GLUCOSE SERPL-MCNC: 202 MG/DL (ref 74–99)
HCT VFR BLD AUTO: 28.3 % (ref 34–46)
HGB BLD-MCNC: 8.9 GM/DL (ref 11.4–16)
LYMPHOCYTES # BLD MANUAL: 1.86 K/UL (ref 1–4.8)
MAGNESIUM SPEC-SCNC: 1.7 MG/DL (ref 1.6–2.3)
MCH RBC QN AUTO: 30.8 PG (ref 25–35)
MCHC RBC AUTO-ENTMCNC: 31.5 G/DL (ref 31–37)
MCV RBC AUTO: 97.8 FL (ref 80–100)
MONOCYTES # BLD MANUAL: 1.18 K/UL (ref 0–1)
NEUTROPHILS NFR BLD MANUAL: 39 %
NEUTS SEG # BLD MANUAL: 1.91 K/UL (ref 1.3–7.7)
PLATELET # BLD AUTO: 56 K/UL (ref 150–450)
POTASSIUM SERPL-SCNC: 3.2 MMOL/L (ref 3.5–5.1)
POTASSIUM SERPL-SCNC: 3.3 MMOL/L (ref 3.5–5.1)
PROT SERPL-MCNC: 4.8 G/DL (ref 6.3–8.2)
SODIUM SERPL-SCNC: 137 MMOL/L (ref 137–145)
SODIUM SERPL-SCNC: 140 MMOL/L (ref 137–145)
WBC # BLD AUTO: 4.9 K/UL (ref 3.8–10.6)

## 2020-05-29 RX ADMIN — PIPERACILLIN AND TAZOBACTAM SCH MLS/HR: 3; .375 INJECTION, POWDER, FOR SOLUTION INTRAVENOUS at 23:58

## 2020-05-29 RX ADMIN — PIPERACILLIN AND TAZOBACTAM SCH MLS/HR: 3; .375 INJECTION, POWDER, FOR SOLUTION INTRAVENOUS at 09:13

## 2020-05-29 RX ADMIN — POTASSIUM BICARBONATE SCH MEQ: 782 TABLET, EFFERVESCENT ORAL at 12:28

## 2020-05-29 RX ADMIN — PANTOPRAZOLE SODIUM SCH MG: 40 INJECTION, POWDER, FOR SOLUTION INTRAVENOUS at 09:13

## 2020-05-29 RX ADMIN — POTASSIUM CHLORIDE SCH MEQ: 20 TABLET, EXTENDED RELEASE ORAL at 22:24

## 2020-05-29 RX ADMIN — POTASSIUM BICARBONATE SCH MEQ: 782 TABLET, EFFERVESCENT ORAL at 09:14

## 2020-05-29 RX ADMIN — RIFAXIMIN SCH MG: 550 TABLET ORAL at 22:24

## 2020-05-29 RX ADMIN — MIDODRINE HYDROCHLORIDE SCH MG: 5 TABLET ORAL at 09:14

## 2020-05-29 RX ADMIN — RIFAXIMIN SCH MG: 550 TABLET ORAL at 10:23

## 2020-05-29 RX ADMIN — ONDANSETRON PRN MG: 2 INJECTION INTRAMUSCULAR; INTRAVENOUS at 22:30

## 2020-05-29 RX ADMIN — MIDODRINE HYDROCHLORIDE SCH MG: 5 TABLET ORAL at 18:18

## 2020-05-29 RX ADMIN — PIPERACILLIN AND TAZOBACTAM SCH MLS/HR: 3; .375 INJECTION, POWDER, FOR SOLUTION INTRAVENOUS at 00:37

## 2020-05-29 RX ADMIN — CHOLESTYRAMINE SCH GM: 4 POWDER, FOR SUSPENSION ORAL at 18:18

## 2020-05-29 RX ADMIN — MIDODRINE HYDROCHLORIDE SCH MG: 5 TABLET ORAL at 12:28

## 2020-05-29 RX ADMIN — LACTULOSE SCH: 20 SOLUTION ORAL at 09:14

## 2020-05-29 RX ADMIN — PIPERACILLIN AND TAZOBACTAM SCH MLS/HR: 3; .375 INJECTION, POWDER, FOR SOLUTION INTRAVENOUS at 16:36

## 2020-05-29 NOTE — P.PN
Subjective





Patient is seen in follow for acute kidney injury.  She is undergoing 3 

treatments of hemodialysis so far.  Last treatment on May 27.  She was extubated

yesterday.  Currently denies any chest pain or shortness of breath.  Foster 

catheter removed this morning.


Yesterday her blood pressure was in the lower side.  She received 1 L normal 

saline bolus.  Midodrine was also added.





Vital signs are stable.


General: The patient appeared well nourished and normally developed. 


HEENT: Head exam is unremarkable. Neck is without jugular venous distension.  

Intubated.


LUNGS: Lungs are clear to auscultation and percussion. Breath sounds decreased.


HEART: Rate and Rhythm are regular. 


ABDOMEN: Soft, nondistended.


EXTREMITITES: Trace edema.





Objective





- Vital Signs


Vital signs: 


                                   Vital Signs











Temp  98.5 F   05/29/20 08:00


 


Pulse  97   05/29/20 11:00


 


Resp  24   05/29/20 11:00


 


BP  100/71   05/29/20 11:00


 


Pulse Ox  99   05/29/20 11:00








                                 Intake & Output











 05/28/20 05/29/20 05/29/20





 18:59 06:59 18:59


 


Intake Total 1211.843 721 232


 


Output Total 665 167 90


 


Balance 546.843 554 142


 


Weight  89.2 kg 89.2 kg


 


Intake:   


 


  IV 1153 721 112


 


    Piperacillin-Tazobactam 3 100  





    .375 gm In Sodium   





    Chloride 0.9% 100 ml @ 25   





    mls/hr IVPB Q12H [facility] Rx#   





    :[id number]   


 


    Piperacillin-Tazobactam 3 200 125 100





    .375 gm In Sodium   





    Chloride 0.9% 100 ml @ 25   





    mls/hr IVPB Q8HR [facility] Rx#   





    :[id number]   


 


    Potassium Chloride 20 meq 100  





    In Water For Injection 1   





    100ml.bag @ 50 mls/hr   





    IVPB Q2H [facility] Rx#:   





    [id number]   


 


    Sodium Chloride 0.9% 1, 120 60 





    000 ml @ 10 mls/hr IV .   





    Q24H [facility] Rx#:[id number]   


 


    Sodium Chloride 0.9% 500 500 500 





    ml 500 ml @ 999 mls/hr IV   





    .Q31M [facility] Rx#:[id number]   


 


    metroNIDAZOLE-NS  100  





    mg In Saline 1 100ml.bag   





    @ 100 mls/hr IVPB [facility]   





    Rx#:[id number]   


 


    pressure bag 33 36 12


 


  Intake, IV Titration 58.843  





  Amount   


 


    Dexmedetomidine/0.9% NaCl 58.843  





    (Pmx) 400 mcg In Empty   





    Bag 1 bag @ Titrate IV .   





    Q[facility]M [facility] Rx#:[id number]   


 


  Oral   120


 


Output:   


 


  Gastric Drainage 150  


 


  Urine 215 167 90


 


  Stool 300  


 


Other:   


 


  Voiding Method Indwelling Catheter Indwelling Catheter 


 


  # Voids 0  








                       ABP, PAP, CO, CI - Last Documented











Arterial Blood Pressure        112/48

















- Labs


CBC & Chem 7: 


                                 05/29/20 04:50





                                 05/29/20 04:50


Labs: 


                  Abnormal Lab Results - Last 24 Hours (Table)











  05/28/20 05/28/20 05/29/20 Range/Units





  16:28 19:56 00:09 


 


RBC     (3.80-5.40)  m/uL


 


Hgb     (11.4-16.0)  gm/dL


 


Hct     (34.0-46.0)  %


 


RDW     (11.5-15.5)  %


 


Plt Count     (150-450)  k/uL


 


Monocytes # (Manual)     (0-1.0)  k/uL


 


Potassium     (3.5-5.1)  mmol/L


 


Chloride     ()  mmol/L


 


BUN     (7-17)  mg/dL


 


Creatinine     (0.52-1.04)  mg/dL


 


Glucose     (74-99)  mg/dL


 


POC Glucose (mg/dL)  126 H  119 H  111 H  (75-99)  mg/dL


 


Calcium     (8.4-10.2)  mg/dL


 


Total Bilirubin     (0.2-1.3)  mg/dL


 


AST     (14-36)  U/L


 


ALT     (4-34)  U/L


 


Alkaline Phosphatase     ()  U/L


 


Total Protein     (6.3-8.2)  g/dL


 


Albumin     (3.5-5.0)  g/dL














  05/29/20 05/29/20 Range/Units





  04:50 04:50 


 


RBC  2.89 L   (3.80-5.40)  m/uL


 


Hgb  8.9 L   (11.4-16.0)  gm/dL


 


Hct  28.3 L   (34.0-46.0)  %


 


RDW  20.1 H   (11.5-15.5)  %


 


Plt Count  56 L D   (150-450)  k/uL


 


Monocytes # (Manual)  1.18 H   (0-1.0)  k/uL


 


Potassium   3.2 L  (3.5-5.1)  mmol/L


 


Chloride   108 H  ()  mmol/L


 


BUN   22 H  (7-17)  mg/dL


 


Creatinine   2.17 H  (0.52-1.04)  mg/dL


 


Glucose   102 H  (74-99)  mg/dL


 


POC Glucose (mg/dL)    (75-99)  mg/dL


 


Calcium   7.7 L  (8.4-10.2)  mg/dL


 


Total Bilirubin   2.5 H  (0.2-1.3)  mg/dL


 


AST   254 H  (14-36)  U/L


 


ALT   83 H  (4-34)  U/L


 


Alkaline Phosphatase   246 H  ()  U/L


 


Total Protein   4.8 L  (6.3-8.2)  g/dL


 


Albumin   2.0 L  (3.5-5.0)  g/dL








                      Microbiology - Last 24 Hours (Table)











 05/23/20 15:36 Blood Culture - Preliminary





 Blood    No Growth after 120 hours














Assessment and Plan


Plan: 





Assessment:


1.  Acute kidney injury secondary to ATN secondary to septic shock.  Creatinine 

peaked at 4 this admission.  CAT scan revealed fullness of the collecting 

systems.  No hydronephrosis noted on renal ultrasound.  Unknown baseline renal 

function.  Started on hemodialysis on May 25 - last treatment on May 27.  

Creatinine fairly stable at 2.17 today.


2.  Hyperkalemia secondary to acute kidney injury, metabolic acidosis and GI 

bleed.  Improved with medical management.  Now hypokalemic, being replaced.


3.  Severe metabolic acidosis secondary to acute kidney injury and lactic 

acidosis. Volatile screen negative.  Resolved. 


4.  Hypocalcemia secondary to acute kidney injury.  Corrected calcium normal.


5.  Acute GI bleed status post blood transfusion and IV DDAVP.  GI following.  


6.  UTI with urine culture positive for E. coli maintain on antibiotics.


7. ? Ischemic colitis.


8.  Alcohol-induced liver cirrhosis.





Plan:


Potassium is being replaced.


Maintain midodrine.


Continue to monitor renal function and urine output.


Hold hemodialysis today.  Continue to assess on daily basis.


Add Aranesp.

## 2020-05-29 NOTE — XR
EXAMINATION TYPE: XR chest 1V portable

 

DATE OF EXAM: 5/29/2020

 

Comparison: 5/28/2020

 

Clinical History: 49-year-old male Tube placement

 

Findings:

Interval extubation. NG tube remains in place. Heart is upper limits of normal in size. Patchy and co
nfluent bilateral airspace opacities especially in the periphery and lower lungs, right greater than 
left have increased in the interval. Suspect small right pleural effusion. Loop recorder device.

 

 

Impression:

Interval extubation but now with worsening peripheral and bibasilar infiltrates, right greater left. 
Possible small right effusion.

## 2020-05-29 NOTE — P.PN
Subjective


Progress Note Date: 05/29/20


Principal diagnosis: 





Cardiac arrest





The patient is seen today 05/29/2020 in follow-up in the intensive care unit.  

This is a 49-year-old female who suffered a cardiac arrest on 05/23/2020.  She 

required mechanical ventilation and was subsequently extubated yesterday 

05/28/2020.  She is currently awake and alert in no acute distress.  She is 

maintaining good O2 saturations in the 90s on 2 L/m per nasal cannula.  No 

current IV fluids required.  No drips.  This x-ray does show bibasilar 

infiltrates right greater than left.  He had required 2 units of packed red 

blood cells, 2 fresh frozen plasma and 1 platelet this admission.  Urine culture

was positive for E. coli.  Sputum culture negative.  Blood cultures reveal no 

growth.  White count 4.9.  Hemoglobin 8.9.  Platelets 56,000.  Sodium 137.  

Potassium 3.2.  Creatinine 2.17.  .  ALT 83.  She remains on Zosyn.





Objective





- Vital Signs


Vital signs: 


                                   Vital Signs











Temp  98.5 F   05/29/20 08:00


 


Pulse  89   05/29/20 09:00


 


Resp  18   05/29/20 09:00


 


BP  84/47   05/29/20 07:00


 


Pulse Ox  98   05/29/20 09:00








                                 Intake & Output











 05/28/20 05/29/20 05/29/20





 18:59 06:59 18:59


 


Intake Total 1211.843 721 109


 


Output Total 665 167 90


 


Balance 546.843 554 19


 


Weight  89.2 kg 


 


Intake:   


 


  IV 1153 721 109


 


    Piperacillin-Tazobactam 3 100  





    .375 gm In Sodium   





    Chloride 0.9% 100 ml @ 25   





    mls/hr IVPB Q12H [facility] Rx#   





    :[id number]   


 


    Piperacillin-Tazobactam 3 200 125 100





    .375 gm In Sodium   





    Chloride 0.9% 100 ml @ 25   





    mls/hr IVPB Q8HR [facility] Rx#   





    :[id number]   


 


    Potassium Chloride 20 meq 100  





    In Water For Injection 1   





    100ml.bag @ 50 mls/hr   





    IVPB Q2H [facility] Rx#:   





    [id number]   


 


    Sodium Chloride 0.9% 1, 120 60 





    000 ml @ 10 mls/hr IV .   





    Q24H [facility] Rx#:[id number]   


 


    Sodium Chloride 0.9% 500 500 500 





    ml 500 ml @ 999 mls/hr IV   





    .Q31M [facility] Rx#:[id number]   


 


    metroNIDAZOLE-NS  100  





    mg In Saline 1 100ml.bag   





    @ 100 mls/hr IVPB TID [facility]   





    Rx#:[id number]   


 


    pressure bag 33 36 9


 


  Intake, IV Titration 58.843  





  Amount   


 


    Dexmedetomidine/0.9% NaCl 58.843  





    (Pmx) 400 mcg In Empty   





    Bag 1 bag @ Titrate IV .   





    Q0M [facility] Rx#:[id number]   


 


Output:   


 


  Gastric Drainage 150  


 


  Urine 215 167 90


 


  Stool 300  


 


Other:   


 


  Voiding Method Indwelling Catheter Indwelling Catheter 


 


  # Voids 0  








                       ABP, PAP, CO, CI - Last Documented











Arterial Blood Pressure        112/48

















- Exam





GENERAL EXAM: Alert, pleasant 49 year old female patient, on 2 L nasal cannula, 

comfortable in no apparent distress.


HEAD: Normocephalic.


EYES: Normal reaction of pupils, equal size.


NOSE: Clear with pink turbinates.


THROAT: No erythema or exudates.


NECK: No masses, no JVD.


CHEST: No chest wall deformity.


LUNGS: Equal air entry with crackles in the bilateral posterior bases


CVS: S1 and S2 normal with no audible murmur, regular rhythm.


ABDOMEN: No hepatosplenomegaly, normal bowel sounds, no guarding or rigidity.


SPINE: No scoliosis or deformity


SKIN: No rashes


CENTRAL NERVOUS SYSTEM: No focal deficits, tone is normal in all 4 extremities.


EXTREMITIES: There is no peripheral edema.  No clubbing, no cyanosis.  

Peripheral pulses are intact.





- Labs


CBC & Chem 7: 


                                 05/29/20 04:50





                                 05/29/20 04:50


Labs: 


                  Abnormal Lab Results - Last 24 Hours (Table)











  05/28/20 05/28/20 05/29/20 Range/Units





  16:28 19:56 00:09 


 


RBC     (3.80-5.40)  m/uL


 


Hgb     (11.4-16.0)  gm/dL


 


Hct     (34.0-46.0)  %


 


RDW     (11.5-15.5)  %


 


Plt Count     (150-450)  k/uL


 


Monocytes # (Manual)     (0-1.0)  k/uL


 


Potassium     (3.5-5.1)  mmol/L


 


Chloride     ()  mmol/L


 


BUN     (7-17)  mg/dL


 


Creatinine     (0.52-1.04)  mg/dL


 


Glucose     (74-99)  mg/dL


 


POC Glucose (mg/dL)  126 H  119 H  111 H  (75-99)  mg/dL


 


Calcium     (8.4-10.2)  mg/dL


 


Total Bilirubin     (0.2-1.3)  mg/dL


 


AST     (14-36)  U/L


 


ALT     (4-34)  U/L


 


Alkaline Phosphatase     ()  U/L


 


Total Protein     (6.3-8.2)  g/dL


 


Albumin     (3.5-5.0)  g/dL














  05/29/20 05/29/20 Range/Units





  04:50 04:50 


 


RBC  2.89 L   (3.80-5.40)  m/uL


 


Hgb  8.9 L   (11.4-16.0)  gm/dL


 


Hct  28.3 L   (34.0-46.0)  %


 


RDW  20.1 H   (11.5-15.5)  %


 


Plt Count  56 L D   (150-450)  k/uL


 


Monocytes # (Manual)  1.18 H   (0-1.0)  k/uL


 


Potassium   3.2 L  (3.5-5.1)  mmol/L


 


Chloride   108 H  ()  mmol/L


 


BUN   22 H  (7-17)  mg/dL


 


Creatinine   2.17 H  (0.52-1.04)  mg/dL


 


Glucose   102 H  (74-99)  mg/dL


 


POC Glucose (mg/dL)    (75-99)  mg/dL


 


Calcium   7.7 L  (8.4-10.2)  mg/dL


 


Total Bilirubin   2.5 H  (0.2-1.3)  mg/dL


 


AST   254 H  (14-36)  U/L


 


ALT   83 H  (4-34)  U/L


 


Alkaline Phosphatase   246 H  ()  U/L


 


Total Protein   4.8 L  (6.3-8.2)  g/dL


 


Albumin   2.0 L  (3.5-5.0)  g/dL








                      Microbiology - Last 24 Hours (Table)











 05/23/20 15:36 Blood Culture - Preliminary





 Blood    No Growth after 120 hours














Assessment and Plan


Assessment: 





1 Cardiopulmonary arrest with brief resuscitation requiring intubation 

mechanical ventilatory support on 05/23/2020.  Subsequently extubated on 

05/28/2020.





2 Acute hypoxemic respiratory failure secondary to above





3 Acute septic shock and possible ischemic colitis and/or suspected intra-

abdominal source of sepsis





4 E. coli urinary tract infection with sepsis





5 Acute blood loss anemia requiring 2 units of packed red blood cell 

transfusions current hemoglobin 8.9





6 Acute thrombocytopenia secondary to sepsis





7 Acute liver shock with hypotension secondary to sepsis, recovered





8 Altered mental status suspect secondary to hepatic encephalopathy





9 Acute kidney injury





10 Chronic kidney disease





11 Acute ALCOHOL intoxication





12 Chronic tobacco dependence





Plan:





The patient was seen and evaluated by Dr. Rodriguez


Chest x-ray and labs reviewed


Continue Zosyn


Encourage increased use the incentive spirometer and cough and deep breathing 

exercises


Increase her activity as tolerated


To be transferred out of the ICU today


We'll continue to follow





I, the cosigning physician, performed a history & physical examination of the 

patient. Lungs sounds with crackles in the bilateral posterior bases.  

Maintaining good O2 saturations in the 90s on 2 L/m per nasal cannula.  I 

discussed the assessment and plan of care with my nurse practitioner, Dina Wills. I attest to the above note as dictated by her.

## 2020-05-29 NOTE — P.PN
Subjective


Progress Note Date: 05/29/20


Principal diagnosis: 





Elevated liver enzymes, altered mental status, hematemesis





Patient is seen lying in bed in no acute complaints.  No signs or symptoms of GI

bleed.  Asking for diet.





Objective





- Vital Signs


Vital signs: 


                                   Vital Signs











Temp  98.5 F   05/29/20 08:00


 


Pulse  97   05/29/20 11:00


 


Resp  24   05/29/20 11:00


 


BP  100/71   05/29/20 11:00


 


Pulse Ox  99   05/29/20 11:00








                                 Intake & Output











 05/28/20 05/29/20 05/29/20





 18:59 06:59 18:59


 


Intake Total 1211.843 721 232


 


Output Total 665 167 90


 


Balance 546.843 554 142


 


Weight  89.2 kg 89.2 kg


 


Intake:   


 


  IV 1153 721 112


 


    Piperacillin-Tazobactam 3 100  





    .375 gm In Sodium   





    Chloride 0.9% 100 ml @ 25   





    mls/hr IVPB Q12H [facility] Rx#   





    :[id number]   


 


    Piperacillin-Tazobactam 3 200 125 100





    .375 gm In Sodium   





    Chloride 0.9% 100 ml @ 25   





    mls/hr IVPB Q8HR [facility] Rx#   





    :[id number]   


 


    Potassium Chloride 20 meq 100  





    In Water For Injection 1   





    100ml.bag @ 50 mls/hr   





    IVPB Q2H [facility] Rx#:   





    [id number]   


 


    Sodium Chloride 0.9% 1, 120 60 





    000 ml @ 10 mls/hr IV .   





    Q24H [facility] Rx#:[id number]   


 


    Sodium Chloride 0.9% 500 500 500 





    ml 500 ml @ 999 mls/hr IV   





    .Q31M [facility] Rx#:[id number]   


 


    metroNIDAZOLE-NS  100  





    mg In Saline 1 100ml.bag   





    @ 100 mls/hr IVPB TID [facility]   





    Rx#:[id number]   


 


    pressure bag 33 36 12


 


  Intake, IV Titration 58.843  





  Amount   


 


    Dexmedetomidine/0.9% NaCl 58.843  





    (Pmx) 400 mcg In Empty   





    Bag 1 bag @ Titrate IV .   





    Q0M [facility] Rx#:[id number]   


 


  Oral   120


 


Output:   


 


  Gastric Drainage 150  


 


  Urine 215 167 90


 


  Stool 300  


 


Other:   


 


  Voiding Method Indwelling Catheter Indwelling Catheter 


 


  # Voids 0  








                       ABP, PAP, CO, CI - Last Documented











Arterial Blood Pressure        112/48

















- Exam





On physical examination, patient appears comfortable in no apparent distress. 


HEAD: Normocephalic, atraumatic. 


EYES: No scleral icterus.  Conjunctival injection noted in the left eye. 


MOUTH: No lesions, tongue midline. 


NECK: Trachea midline, no gross abnormalities. 


ABDOMEN: Soft, obese. Bowel sounds are positive. No organomegaly.  No guarding 

or rigidity.


EXTREMITIES: No pedal edema. 


SKIN: No rashes, no jaundice. 


NEUROLOGIC: Alert and oriented to person and place, no asterixis noted. 





- Labs


CBC & Chem 7: 


                                 05/29/20 04:50





                                 05/29/20 04:50


Labs: 


                  Abnormal Lab Results - Last 24 Hours (Table)











  05/28/20 05/28/20 05/29/20 Range/Units





  16:28 19:56 00:09 


 


RBC     (3.80-5.40)  m/uL


 


Hgb     (11.4-16.0)  gm/dL


 


Hct     (34.0-46.0)  %


 


RDW     (11.5-15.5)  %


 


Plt Count     (150-450)  k/uL


 


Monocytes # (Manual)     (0-1.0)  k/uL


 


Potassium     (3.5-5.1)  mmol/L


 


Chloride     ()  mmol/L


 


BUN     (7-17)  mg/dL


 


Creatinine     (0.52-1.04)  mg/dL


 


Glucose     (74-99)  mg/dL


 


POC Glucose (mg/dL)  126 H  119 H  111 H  (75-99)  mg/dL


 


Calcium     (8.4-10.2)  mg/dL


 


Total Bilirubin     (0.2-1.3)  mg/dL


 


AST     (14-36)  U/L


 


ALT     (4-34)  U/L


 


Alkaline Phosphatase     ()  U/L


 


Total Protein     (6.3-8.2)  g/dL


 


Albumin     (3.5-5.0)  g/dL














  05/29/20 05/29/20 Range/Units





  04:50 04:50 


 


RBC  2.89 L   (3.80-5.40)  m/uL


 


Hgb  8.9 L   (11.4-16.0)  gm/dL


 


Hct  28.3 L   (34.0-46.0)  %


 


RDW  20.1 H   (11.5-15.5)  %


 


Plt Count  56 L D   (150-450)  k/uL


 


Monocytes # (Manual)  1.18 H   (0-1.0)  k/uL


 


Potassium   3.2 L  (3.5-5.1)  mmol/L


 


Chloride   108 H  ()  mmol/L


 


BUN   22 H  (7-17)  mg/dL


 


Creatinine   2.17 H  (0.52-1.04)  mg/dL


 


Glucose   102 H  (74-99)  mg/dL


 


POC Glucose (mg/dL)    (75-99)  mg/dL


 


Calcium   7.7 L  (8.4-10.2)  mg/dL


 


Total Bilirubin   2.5 H  (0.2-1.3)  mg/dL


 


AST   254 H  (14-36)  U/L


 


ALT   83 H  (4-34)  U/L


 


Alkaline Phosphatase   246 H  ()  U/L


 


Total Protein   4.8 L  (6.3-8.2)  g/dL


 


Albumin   2.0 L  (3.5-5.0)  g/dL








                      Microbiology - Last 24 Hours (Table)











 05/23/20 15:36 Blood Culture - Preliminary





 Blood    No Growth after 120 hours














Assessment and Plan


(1) Elevated liver enzymes


Narrative/Plan: 


49-year-old female presenting with multiple complaints, found to have elevated 

liver enzymes, likely the results of alcoholic hepatitis.  They have remained 

stable.


Current Visit: Yes   Status: Acute   Code(s): R74.8 - ABNORMAL LEVELS OF OTHER 

SERUM ENZYMES   SNOMED Code(s): 471666535


   





(2) Hematemesis


Narrative/Plan: 


Some dark-colored emesis after NG tube removal, likely traumatic in the setting 

of nasogastric tube.  No further episodes.  Hemoglobin has remained stable.


Current Visit: Yes   Status: Acute   Code(s): K92.0 - HEMATEMESIS   SNOMED Code

(s): 0720459


   





(3) Altered mental status


Narrative/Plan: 


Altered mental status secondary to hepatic encephalopathy.  Currently improving.


Current Visit: Yes   Status: Acute   Code(s): R41.82 - ALTERED MENTAL STATUS, 

UNSPECIFIED   SNOMED Code(s): 614944742


   


Plan: 





Supportive care


Continue to monitor hemoglobin and hematocrit and transfuse as needed


Okay to initiate liquid diet


Continue to monitor stool output


Continue Protonix therapy


Continue lactulose and rifaximin, titrated for 2-3 bowel movements daily


plans for endoscopic evaluation at this time


Thank you for allowing us to participate in care of the patient

## 2020-05-29 NOTE — PN
PROGRESS NOTE



DATE OF SERVICE:

05/29/2020



REASON FOR FOLLOWUP:

Possible aspiration pneumonia.



INTERVAL HISTORY:

The patient is currently afebrile.  The patient has been breathing comfortably.  She is

more awake, alert, complaining of cough and diarrhea and some abdominal pain.  No

vomiting.



PHYSICAL EXAMINATION:

Blood pressure 107/60 with a pulse of 84, temperature 98.2, she is 100% on 2 L nasal

cannula.

General description is a middle-aged female, lying in bed in no distress.

RESPIRATORY SYSTEM: Unlabored breathing, clear to auscultation anteriorly.

HEART:  S1, S2.  Regular rate and rhythm.

ABDOMEN: Soft. No tenderness.



LABS:

Hemoglobin 8.1, white count of 4.9, BUN of 22, creatinine is 2.17.



DIAGNOSTIC IMPRESSION AND PLAN:

1. Patient admitted to hospital with unresponsiveness, but patient did _____ in the

    ER.  Clinical concern for encephalitis and possible right lower lobe aspiration

    pneumonia. Patient is covered with Zosyn.

2. Diarrhea possible antibiotic associated. _____ has been negative.  We will add

    Questran for symptomatic relief.





MMODL / IJN: [id number] / Job#: [id number]

## 2020-05-29 NOTE — PN
PROGRESS NOTE



DATE OF SERVICE:

05/29/2020.



CHIEF COMPLAINT:

Cardiorespiratory arrest with encephalopathy.



HISTORY OF PRESENT ILLNESS:

This lady is much improved.  She is extubated and she is responding.  She is responding

quite appropriately.  At present time she denies any abdominal pain.



PHYSICAL EXAMINATION:

Vital signs are normal.  Eyes are open.  She has proptosis and she has subconjunctival

hemorrhage on the left.  Pupils equally round.  Chest is quite clear.  Cardiac exam is

normal.  Abdomen is soft, nontender without masses.



IMPRESSION:

1. Cardiorespiratory arrest with anoxic brain injury, improving.

2. Proptosis.

3. Small subconjunctival hemorrhage in the left eye.



PLAN:

Continue to progress her activity and diet. She can probably be moved out of ICU soon.





MMODL / IJN: [id number] / Job#: [id number]

## 2020-05-30 LAB
ANION GAP SERPL CALC-SCNC: 5 MMOL/L
BUN SERPL-SCNC: 21 MG/DL (ref 7–17)
CALCIUM SPEC-MCNC: 7.9 MG/DL (ref 8.4–10.2)
CELLS COUNTED: 200
CHLORIDE SERPL-SCNC: 112 MMOL/L (ref 98–107)
CO2 SERPL-SCNC: 24 MMOL/L (ref 22–30)
ERYTHROCYTE [DISTWIDTH] IN BLOOD BY AUTOMATED COUNT: 2.8 M/UL (ref 3.8–5.4)
ERYTHROCYTE [DISTWIDTH] IN BLOOD: 20.6 % (ref 11.5–15.5)
GLUCOSE BLD-MCNC: 120 MG/DL (ref 75–99)
GLUCOSE BLD-MCNC: 123 MG/DL (ref 75–99)
GLUCOSE BLD-MCNC: 146 MG/DL (ref 75–99)
GLUCOSE BLD-MCNC: 157 MG/DL (ref 75–99)
GLUCOSE SERPL-MCNC: 137 MG/DL (ref 74–99)
HCT VFR BLD AUTO: 27.7 % (ref 34–46)
HGB BLD-MCNC: 8.8 GM/DL (ref 11.4–16)
LYMPHOCYTES # BLD MANUAL: 1.75 K/UL (ref 1–4.8)
MCH RBC QN AUTO: 31.5 PG (ref 25–35)
MCHC RBC AUTO-ENTMCNC: 31.8 G/DL (ref 31–37)
MCV RBC AUTO: 99.1 FL (ref 80–100)
MONOCYTES # BLD MANUAL: 2.04 K/UL (ref 0–1)
NEUTROPHILS NFR BLD MANUAL: 36 %
NEUTS SEG # BLD MANUAL: 3.5 K/UL (ref 1.3–7.7)
PLATELET # BLD AUTO: 93 K/UL (ref 150–450)
POTASSIUM SERPL-SCNC: 3.5 MMOL/L (ref 3.5–5.1)
SODIUM SERPL-SCNC: 141 MMOL/L (ref 137–145)
WBC # BLD AUTO: 7.3 K/UL (ref 3.8–10.6)

## 2020-05-30 RX ADMIN — RIFAXIMIN SCH MG: 550 TABLET ORAL at 08:37

## 2020-05-30 RX ADMIN — CHOLESTYRAMINE SCH GM: 4 POWDER, FOR SUSPENSION ORAL at 18:19

## 2020-05-30 RX ADMIN — MIDODRINE HYDROCHLORIDE SCH MG: 5 TABLET ORAL at 12:46

## 2020-05-30 RX ADMIN — GUAIFENESIN AND DEXTROMETHORPHAN HYDROBROMIDE SCH EACH: 600; 30 TABLET, EXTENDED RELEASE ORAL at 12:45

## 2020-05-30 RX ADMIN — CHOLESTYRAMINE SCH GM: 4 POWDER, FOR SUSPENSION ORAL at 10:30

## 2020-05-30 RX ADMIN — MIDODRINE HYDROCHLORIDE SCH MG: 5 TABLET ORAL at 17:15

## 2020-05-30 RX ADMIN — FLUTICASONE PROPIONATE SCH SPRAY: 50 SPRAY, METERED NASAL at 10:30

## 2020-05-30 RX ADMIN — POTASSIUM CHLORIDE SCH MEQ: 20 TABLET, EXTENDED RELEASE ORAL at 07:15

## 2020-05-30 RX ADMIN — ACETAMINOPHEN PRN MG: 500 TABLET ORAL at 17:15

## 2020-05-30 RX ADMIN — PIPERACILLIN AND TAZOBACTAM SCH MLS/HR: 3; .375 INJECTION, POWDER, FOR SOLUTION INTRAVENOUS at 08:37

## 2020-05-30 RX ADMIN — MIDODRINE HYDROCHLORIDE SCH MG: 5 TABLET ORAL at 07:16

## 2020-05-30 RX ADMIN — GUAIFENESIN AND DEXTROMETHORPHAN HYDROBROMIDE SCH EACH: 600; 30 TABLET, EXTENDED RELEASE ORAL at 21:04

## 2020-05-30 RX ADMIN — PIPERACILLIN AND TAZOBACTAM SCH MLS/HR: 3; .375 INJECTION, POWDER, FOR SOLUTION INTRAVENOUS at 16:13

## 2020-05-30 RX ADMIN — RIFAXIMIN SCH MG: 550 TABLET ORAL at 21:04

## 2020-05-30 RX ADMIN — PIPERACILLIN AND TAZOBACTAM SCH MLS/HR: 3; .375 INJECTION, POWDER, FOR SOLUTION INTRAVENOUS at 23:37

## 2020-05-30 RX ADMIN — PANTOPRAZOLE SODIUM SCH MG: 40 INJECTION, POWDER, FOR SOLUTION INTRAVENOUS at 08:37

## 2020-05-30 NOTE — PN
PROGRESS NOTE



Patient is seen for followup for acute kidney injury.  Her renal function has improved.

Creatinine is down from 4.0 at peak to 1.5 now.  The patient feels well.  Her last

dialysis was on Wednesday which was 3 days ago.  Currently patient has good urine

output.



She is awake, comfortable.  Blood pressure on examination this morning was 100/56,

heart rate 92 per minute, she is afebrile.  Examination of the heart S1, S2.

Examination of the lungs, decreased breath sounds at bases.  Abdomen is soft,

nontender.  Examination of lower extremities shows no evidence of edema. CNS exam

grossly intact.



LAB:

Show sodium 141, potassium 3.5, chloride 112, BUN 21, creatinine 1.5, hemoglobin 8.8

g/dL.



ASSESSMENT:

1. Acute kidney injury, acute tubular necrosis, currently nonoliguric and improving.

    Maintain patient off dialysis. Will likely discontinue dialysis catheter in about 2

    days time.

2. Acute hypoxic respiratory failure status post extubation, currently doing well.

3. Urinary tract infection with urine culture positive for Escherichia coli.

4. Possible ischemic colitis.

5. Alcohol induced liver cirrhosis.

6. Severe metabolic acidosis associated with acute kidney injury, lactic acidosis,

    currently improved.



PLAN:

Continue off dialysis.  Continue to avoid nephrotoxic agents.  Repeat labs in a.m.

Continue with midodrine as blood pressure remains low.





MMODL / IJN: [id number] / Job#: [id number]

## 2020-05-30 NOTE — PN
PROGRESS NOTE



CHIEF COMPLAINT:

Cardiorespiratory arrest.



HISTORY OF PRESENT ILLNESS:

This lady is doing very well.  She is awake and alert and awaits a bed in [facility]. She is not having any chest pain, abdominal pain, etc.  She has had no vomiting

or diarrhea.  She is eating.



PHYSICAL EXAM:

Head, ears, eyes, nose, mouth, and throat are normal.  Chest is quite clear.  Cardiac

exam is normal.  Abdomen is soft and extremities are normal.



IMPRESSION:

Status post cardiorespiratory arrest with renal failure.



PLAN:

Progress to [facility].  Laboratory studies have been improving and she has not

been dialyzed for several days.





MMODL / IJN: [id number] / Job#: [id number]

## 2020-05-30 NOTE — P.PN
Subjective


Progress Note Date: 05/30/20


Principal diagnosis: 





Cardiac arrest





The patient is seen today 05/29/2020 in follow-up in the intensive care unit.  

This is a 49-year-old female who suffered a cardiac arrest on 05/23/2020.  She 

required mechanical ventilation and was subsequently extubated yesterday 

05/28/2020.  She is currently awake and alert in no acute distress.  She is 

maintaining good O2 saturations in the 90s on 2 L/m per nasal cannula.  No 

current IV fluids required.  No drips.  This x-ray does show bibasilar 

infiltrates right greater than left.  He had required 2 units of packed red 

blood cells, 2 fresh frozen plasma and 1 platelet this admission.  Urine culture

was positive for E. coli.  Sputum culture negative.  Blood cultures reveal no 

growth.  White count 4.9.  Hemoglobin 8.9.  Platelets 56,000.  Sodium 137.  

Potassium 3.2.  Creatinine 2.17.  .  ALT 83.  She remains on Zosyn.





The patient is seen today 05/30/2020 in follow-up in the intensive care unit.  

She is currently awake and alert in no acute distress.  Maintaining good O2 

saturations in the 90s on 2 L/m per nasal cannula.  She is having a loose 

nonproductive cough.  She's afebrile.  Hemodynamically stable.  Urine culture 

positive for E. coli.  Sputum culture revealed no growth.  Blood culture 

revealed no growth.  Stool culture revealed no growth.  White count 7.3.  

Hemoglobin 8.8.  Platelet count 93,000.  Sodium 141.  Potassium 3.5.  Creatinine

1.55.  0.9 normal saline at 20 ML's per hour.  Continued on Zosyn.





Objective





- Vital Signs


Vital signs: 


                                   Vital Signs











Temp  98.2 F   05/30/20 08:00


 


Pulse  92   05/30/20 08:00


 


Resp  22   05/30/20 08:00


 


BP  100/56   05/30/20 08:00


 


Pulse Ox  96   05/30/20 08:00








                                 Intake & Output











 05/29/20 05/30/20 05/30/20





 18:59 06:59 18:59


 


Intake Total 572 360 500


 


Output Total 190  200


 


Balance 382 360 300


 


Weight 89.2 kg 96.4 kg 


 


Intake:   


 


   360 100


 


    0.9 KVO  160 


 


    Piperacillin-Tazobactam 3 200 200 100





    .375 gm In Sodium   





    Chloride 0.9% 100 ml @ 25   





    mls/hr IVPB Q8HR [facility] Rx#   





    :[id number]   


 


    pressure bag 12  


 


  Oral 360  400


 


Output:   


 


  Urine 90  200


 


  Stool 100  


 


Other:   


 


  Voiding Method Indwelling Catheter Incontinent Incontinent


 


  # Voids 1  


 


  # Bowel Movements 1  1








                       ABP, PAP, CO, CI - Last Documented











Arterial Blood Pressure        112/48

















- Exam





GENERAL EXAM: Alert, pleasant 49 year old female patient, on 2 L nasal cannula, 

comfortable in no apparent distress.


HEAD: Normocephalic.


EYES: Normal reaction of pupils, equal size.


NOSE: Clear with pink turbinates.


THROAT: No erythema or exudates.


NECK: No masses, no JVD.


CHEST: No chest wall deformity.


LUNGS: Equal air entry with crackles in the bilateral posterior bases


CVS: S1 and S2 normal with no audible murmur, regular rhythm.


ABDOMEN: No hepatosplenomegaly, normal bowel sounds, no guarding or rigidity.


SPINE: No scoliosis or deformity


SKIN: No rashes


CENTRAL NERVOUS SYSTEM: No focal deficits, tone is normal in all 4 extremities.


EXTREMITIES: There is no peripheral edema.  No clubbing, no cyanosis.  

Peripheral pulses are intact.





- Labs


CBC & Chem 7: 


                                 05/30/20 04:50





                                 05/30/20 04:50


Labs: 


                  Abnormal Lab Results - Last 24 Hours (Table)











  05/29/20 05/29/20 05/30/20 Range/Units





  20:50 21:19 04:50 


 


RBC    2.80 L  (3.80-5.40)  m/uL


 


Hgb    8.8 L  (11.4-16.0)  gm/dL


 


Hct    27.7 L  (34.0-46.0)  %


 


RDW    20.6 H  (11.5-15.5)  %


 


Plt Count    93 L D  (150-450)  k/uL


 


Monocytes # (Manual)    2.04 H  (0-1.0)  k/uL


 


Nucleated RBCs    1 H  (0-0)  /100 WBC


 


Potassium  3.3 L    (3.5-5.1)  mmol/L


 


Chloride  108 H    ()  mmol/L


 


BUN  23 H    (7-17)  mg/dL


 


Creatinine  1.76 H    (0.52-1.04)  mg/dL


 


Glucose  202 H    (74-99)  mg/dL


 


POC Glucose (mg/dL)   182 H   (75-99)  mg/dL


 


Calcium  8.1 L    (8.4-10.2)  mg/dL














  05/30/20 05/30/20 05/30/20 Range/Units





  04:50 07:09 11:47 


 


RBC     (3.80-5.40)  m/uL


 


Hgb     (11.4-16.0)  gm/dL


 


Hct     (34.0-46.0)  %


 


RDW     (11.5-15.5)  %


 


Plt Count     (150-450)  k/uL


 


Monocytes # (Manual)     (0-1.0)  k/uL


 


Nucleated RBCs     (0-0)  /100 WBC


 


Potassium     (3.5-5.1)  mmol/L


 


Chloride  112 H    ()  mmol/L


 


BUN  21 H    (7-17)  mg/dL


 


Creatinine  1.55 H    (0.52-1.04)  mg/dL


 


Glucose  137 H    (74-99)  mg/dL


 


POC Glucose (mg/dL)   120 H  157 H  (75-99)  mg/dL


 


Calcium  7.9 L    (8.4-10.2)  mg/dL








                      Microbiology - Last 24 Hours (Table)











 05/25/20 15:10 Stool Culture - Final





 Stool 


 


 05/23/20 15:36 Blood Culture - Final





 Blood    No Growth after 144 hours














Assessment and Plan


Assessment: 





1 Cardiopulmonary arrest with brief resuscitation requiring intubation 

mechanical ventilatory support on 05/23/2020.  Subsequently extubated on 

05/28/2020.  Currently maintaining good O2 saturations on 2 L/m per nasal can

nula.





2 Acute hypoxemic respiratory failure secondary to above, recovered





3 Acute septic shock and possible ischemic colitis and/or suspected intra-

abdominal source of sepsis, recovered





4 E. coli urinary tract infection with sepsis





5 Acute blood loss anemia requiring 2 units of packed red blood cell 

transfusions current hemoglobin 8.8





6 Acute thrombocytopenia secondary to sepsis





7 Acute liver shock with hypotension secondary to sepsis, recovered





8 Altered mental status suspect secondary to hepatic encephalopathy





9 Acute kidney injury





10 Chronic kidney disease





11 Acute ALCOHOL intoxication





12 Chronic tobacco dependence





Plan:





The patient was seen and evaluated by Dr. Rodriguez


She is stable from the pulmonary and critical care standpoint


Add Mucinex


Continue Zosyn


Encourage increased use the incentive spirometer and cough and deep breathing 

exercises


Increase her activity as tolerated


To be transferred to a general medical floor without telemetry today


We'll continue to follow





I, the cosigning physician, performed a history & physical examination of the 

patient. Lungs sounds with crackles in the bilateral posterior bases.  

Maintaining good O2 saturations in the 90s on 2 L/m per nasal cannula.  I 

discussed the assessment and plan of care with my nurse practitioner, Dina Wills. I attest to the above note as dictated by her.

## 2020-05-30 NOTE — PN
PROGRESS NOTE



DATE OF SERVICE:

05/30/2020



REASON FOR FOLLOWUP:

Possible aspiration pneumonitis and diarrhea.



INTERVAL HISTORY:

The patient is currently afebrile.  Patient is breathing more comfortably, complaining

of some left-sided chest pain, more of a dull aching pain.  No nausea, no vomiting.  No

abdominal pain and diarrhea has slowed down.



PHYSICAL EXAMINATION:

Blood pressure 120/78 with a pulse of 80, temperature 98.3.  She is 98% on 2 L nasal

cannula.  General description is a middle-aged female lying in bed in no distress.

Respiratory system: Unlabored breathing, clear to auscultation anteriorly.  Heart S1,

S2.  Regular rate and rhythm.  Abdomen soft, no tenderness.



LABS:

Hemoglobin is 8.8, white count 7.3, BUN of 21, creatinine 1.55.



DIAGNOSTIC IMPRESSION AND PLAN:

Patient admitted to the hospital with sepsis with pneumonia, unresponsive and did have

a cardia chest requiring resuscitation and concern for ischemic colitis.  The patient

is currently on Zosyn to continue along with Questran for the diarrhea and monitor

clinical course closely.





MMODL / IJN: [id number] / Job#: [id number]

## 2020-05-30 NOTE — P.PN
Subjective


Progress Note Date: 05/30/20


Principal diagnosis: 





Elevated liver enzymes, altered mental status, hematemesis





Patient is seen lying in bed denying any abdominal pain, nausea or vomiting.  

She has tolerated a liquid diet.





Objective





- Vital Signs


Vital signs: 


                                   Vital Signs











Temp  98.2 F   05/30/20 08:00


 


Pulse  87   05/30/20 12:00


 


Resp  20   05/30/20 12:00


 


BP  104/60   05/30/20 12:00


 


Pulse Ox  99   05/30/20 12:00








                                 Intake & Output











 05/29/20 05/30/20 05/30/20





 18:59 06:59 18:59


 


Intake Total 572 360 500


 


Output Total 190  200


 


Balance 382 360 300


 


Weight 89.2 kg 96.4 kg 


 


Intake:   


 


   360 100


 


    0.9 KVO  160 


 


    Piperacillin-Tazobactam 3 200 200 100





    .375 gm In Sodium   





    Chloride 0.9% 100 ml @ 25   





    mls/hr IVPB Q8HR [facility] Rx#   





    :[id number]   


 


    pressure bag 12  


 


  Oral 360  400


 


Output:   


 


  Urine 90  200


 


  Stool 100  


 


Other:   


 


  Voiding Method Indwelling Catheter Incontinent Incontinent


 


  # Voids 1  1


 


  # Bowel Movements 1  1








                       ABP, PAP, CO, CI - Last Documented











Arterial Blood Pressure        112/48

















- Exam





On physical examination, patient appears comfortable in no apparent distress. 


HEAD: Normocephalic, atraumatic. 


EYES: No scleral icterus.  Conjunctival injection noted in the left eye. 


MOUTH: No lesions, tongue midline. 


NECK: Trachea midline, no gross abnormalities. 


ABDOMEN: Soft, obese. Bowel sounds are positive. No organomegaly.  No guarding 

or rigidity.


EXTREMITIES: No pedal edema. 


SKIN: No rashes, no jaundice. 


NEUROLOGIC: Alert and oriented to person and place, no asterixis noted. 





- Labs


CBC & Chem 7: 


                                 05/30/20 04:50





                                 05/30/20 04:50


Labs: 


                  Abnormal Lab Results - Last 24 Hours (Table)











  05/29/20 05/29/20 05/30/20 Range/Units





  20:50 21:19 04:50 


 


RBC    2.80 L  (3.80-5.40)  m/uL


 


Hgb    8.8 L  (11.4-16.0)  gm/dL


 


Hct    27.7 L  (34.0-46.0)  %


 


RDW    20.6 H  (11.5-15.5)  %


 


Plt Count    93 L D  (150-450)  k/uL


 


Monocytes # (Manual)    2.04 H  (0-1.0)  k/uL


 


Nucleated RBCs    1 H  (0-0)  /100 WBC


 


Potassium  3.3 L    (3.5-5.1)  mmol/L


 


Chloride  108 H    ()  mmol/L


 


BUN  23 H    (7-17)  mg/dL


 


Creatinine  1.76 H    (0.52-1.04)  mg/dL


 


Glucose  202 H    (74-99)  mg/dL


 


POC Glucose (mg/dL)   182 H   (75-99)  mg/dL


 


Calcium  8.1 L    (8.4-10.2)  mg/dL














  05/30/20 05/30/20 05/30/20 Range/Units





  04:50 07:09 11:47 


 


RBC     (3.80-5.40)  m/uL


 


Hgb     (11.4-16.0)  gm/dL


 


Hct     (34.0-46.0)  %


 


RDW     (11.5-15.5)  %


 


Plt Count     (150-450)  k/uL


 


Monocytes # (Manual)     (0-1.0)  k/uL


 


Nucleated RBCs     (0-0)  /100 WBC


 


Potassium     (3.5-5.1)  mmol/L


 


Chloride  112 H    ()  mmol/L


 


BUN  21 H    (7-17)  mg/dL


 


Creatinine  1.55 H    (0.52-1.04)  mg/dL


 


Glucose  137 H    (74-99)  mg/dL


 


POC Glucose (mg/dL)   120 H  157 H  (75-99)  mg/dL


 


Calcium  7.9 L    (8.4-10.2)  mg/dL








                      Microbiology - Last 24 Hours (Table)











 05/25/20 15:10 Stool Culture - Final





 Stool 


 


 05/23/20 15:36 Blood Culture - Final





 Blood    No Growth after 144 hours














Assessment and Plan


(1) Elevated liver enzymes


Narrative/Plan: 


49-year-old female presenting with multiple complaints, found to have elevated 

liver enzymes, likely the results of alcoholic hepatitis.  They have remained 

stable.


Current Visit: Yes   Status: Acute   Code(s): R74.8 - ABNORMAL LEVELS OF OTHER 

SERUM ENZYMES   SNOMED Code(s): 685473911


   





(2) Hematemesis


Narrative/Plan: 


Some dark-colored emesis after NG tube removal, likely traumatic in the setting 

of nasogastric tube.  No further episodes.  Hemoglobin has remained stable.


Current Visit: Yes   Status: Acute   Code(s): K92.0 - HEMATEMESIS   SNOMED 

Code(s): 5556221


   





(3) Altered mental status


Narrative/Plan: 


Altered mental status secondary to hepatic encephalopathy.  Currently improving.


Current Visit: Yes   Status: Acute   Code(s): R41.82 - ALTERED MENTAL STATUS, 

UNSPECIFIED   SNOMED Code(s): 076347344


   


Plan: 





Supportive care


Continue to monitor hemoglobin and hematocrit and transfuse as needed


Okay to advanced to a low-sodium diet


Continue to monitor stool output


Continue Protonix therapy


Continue lactulose and rifaximin, titrated for 2-3 bowel movements daily


No plans for endoscopic evaluation at this time


Thank you for allowing us to participate in care of the patient DISPLAY PLAN FREE TEXT

## 2020-05-31 LAB
ANION GAP SERPL CALC-SCNC: 6 MMOL/L
BUN SERPL-SCNC: 15 MG/DL (ref 7–17)
CALCIUM SPEC-MCNC: 8.2 MG/DL (ref 8.4–10.2)
CHLORIDE SERPL-SCNC: 111 MMOL/L (ref 98–107)
CO2 SERPL-SCNC: 23 MMOL/L (ref 22–30)
GLUCOSE BLD-MCNC: 119 MG/DL (ref 75–99)
GLUCOSE BLD-MCNC: 132 MG/DL (ref 75–99)
GLUCOSE BLD-MCNC: 165 MG/DL (ref 75–99)
GLUCOSE BLD-MCNC: 172 MG/DL (ref 75–99)
GLUCOSE SERPL-MCNC: 166 MG/DL (ref 74–99)
POTASSIUM SERPL-SCNC: 4.5 MMOL/L (ref 3.5–5.1)
SODIUM SERPL-SCNC: 140 MMOL/L (ref 137–145)

## 2020-05-31 RX ADMIN — GUAIFENESIN AND DEXTROMETHORPHAN HYDROBROMIDE SCH EACH: 600; 30 TABLET, EXTENDED RELEASE ORAL at 08:32

## 2020-05-31 RX ADMIN — GUAIFENESIN AND DEXTROMETHORPHAN HYDROBROMIDE SCH EACH: 600; 30 TABLET, EXTENDED RELEASE ORAL at 22:05

## 2020-05-31 RX ADMIN — ACETAMINOPHEN PRN MG: 500 TABLET ORAL at 22:08

## 2020-05-31 RX ADMIN — CHOLESTYRAMINE SCH GM: 4 POWDER, FOR SUSPENSION ORAL at 17:21

## 2020-05-31 RX ADMIN — FUROSEMIDE SCH MG: 10 INJECTION, SOLUTION INTRAMUSCULAR; INTRAVENOUS at 22:04

## 2020-05-31 RX ADMIN — BUDESONIDE AND FORMOTEROL FUMARATE DIHYDRATE SCH PUFF: 160; 4.5 AEROSOL RESPIRATORY (INHALATION) at 21:30

## 2020-05-31 RX ADMIN — MIDODRINE HYDROCHLORIDE SCH MG: 5 TABLET ORAL at 17:21

## 2020-05-31 RX ADMIN — FLUTICASONE PROPIONATE SCH SPRAY: 50 SPRAY, METERED NASAL at 08:32

## 2020-05-31 RX ADMIN — PIPERACILLIN AND TAZOBACTAM SCH MLS/HR: 3; .375 INJECTION, POWDER, FOR SOLUTION INTRAVENOUS at 08:32

## 2020-05-31 RX ADMIN — CHOLESTYRAMINE SCH GM: 4 POWDER, FOR SUSPENSION ORAL at 08:32

## 2020-05-31 RX ADMIN — PIPERACILLIN AND TAZOBACTAM SCH MLS/HR: 3; .375 INJECTION, POWDER, FOR SOLUTION INTRAVENOUS at 17:20

## 2020-05-31 RX ADMIN — MIDODRINE HYDROCHLORIDE SCH MG: 5 TABLET ORAL at 08:31

## 2020-05-31 RX ADMIN — RIFAXIMIN SCH MG: 550 TABLET ORAL at 08:31

## 2020-05-31 RX ADMIN — RIFAXIMIN SCH MG: 550 TABLET ORAL at 22:04

## 2020-05-31 RX ADMIN — MIDODRINE HYDROCHLORIDE SCH MG: 5 TABLET ORAL at 12:21

## 2020-05-31 RX ADMIN — PANTOPRAZOLE SODIUM SCH MG: 40 INJECTION, POWDER, FOR SOLUTION INTRAVENOUS at 08:32

## 2020-05-31 RX ADMIN — IPRATROPIUM BROMIDE AND ALBUTEROL SULFATE PRN ML: .5; 3 SOLUTION RESPIRATORY (INHALATION) at 20:48

## 2020-05-31 NOTE — PN
PROGRESS NOTE



Patient is seen for followup for acute kidney injury.  Her renal function has improved

significantly.  Serum creatinine is down to 0.84 today.  The patient is complaining of

cough, which is worse when she lays down.  She continues to have good urine output.

The patient had hemodialysis treatments initially, however, dialysis has been on hold

and her renal function continues to improve.



PHYSICAL EXAMINATION:

On examination today, blood pressure was 109/70, heart rate 87 per minute, she is

afebrile. Examination of the heart S1, S2.  Examination of the lungs, decreased breath

sounds at bases, basal crackles are heard.  Abdomen is soft, nontender.

Examination of lower extremities shows edema 1+ bilaterally CNS exam grossly intact.



LABS:

Show sodium 140, potassium 4.5, chloride 111, CO2 is 23. BUN 15, creatinine 0.84 today,

calcium 8.2.



ASSESSMENT:

1. Acute kidney injury, acute tubular necrosis, currently significantly improved.  I

    will discontinue the dialysis catheter. Patient does not need any further dialysis

    treatments.

2. Volume overload.  I will give her a dose of Lasix x1 and maintain her on scheduled

    dose of IV Lasix.

3. Status post cardiopulmonary arrest.

4. Hypoxic respiratory failure, currently extubated, doing well.

5. Urinary tract infection with Escherichia coli maintained on antibiotics.

6. Hypotension, currently on midodrine.  Blood pressure remains on the lower side.  I

    will continue with the midodrine for now.



PLAN:

IV Lasix x1 and then continue q.12 hours. DC dialysis catheter.  Repeat labs in a.m.





MMODL / IJN: [id number] / Job#: [id number]

## 2020-05-31 NOTE — P.PN
Subjective


Progress Note Date: 05/31/20


Principal diagnosis: 





Elevated liver enzymes, altered mental status, hematemesis





Patient is seen lying in bed reporting that she is feeling better.  She is 

tolerated advancement in her diet.  No nausea, vomiting or abdominal pain.





Objective





- Vital Signs


Vital signs: 


                                   Vital Signs











Temp  98 F   05/31/20 07:50


 


Pulse  86   05/31/20 07:50


 


Resp  16   05/31/20 07:50


 


BP  122/73   05/31/20 07:50


 


Pulse Ox  91 L  05/31/20 07:50








                                 Intake & Output











 05/30/20 05/31/20 05/31/20





 18:59 06:59 18:59


 


Intake Total 600  


 


Output Total 350  


 


Balance 250  


 


Weight  88.5 kg 


 


Intake:   


 


    


 


    Piperacillin-Tazobactam 3 100  





    .375 gm In Sodium   





    Chloride 0.9% 100 ml @ 25   





    mls/hr IVPB Q8HR [facility] Rx#   





    :[id number]   


 


  Oral 500  


 


Output:   


 


  Urine 350  


 


Other:   


 


  Voiding Method Incontinent Incontinent Incontinent


 


  # Voids 1 1 


 


  # Bowel Movements 1  








                       ABP, PAP, CO, CI - Last Documented











Arterial Blood Pressure        112/48

















- Exam





On physical examination, patient appears comfortable in no apparent distress. 


HEAD: Normocephalic, atraumatic. 


EYES: No scleral icterus.  Conjunctival injection noted in the left eye. 


MOUTH: No lesions, tongue midline. 


NECK: Trachea midline, no gross abnormalities. 


ABDOMEN: Soft, obese. Bowel sounds are positive. No organomegaly.  No guarding 

or rigidity.


EXTREMITIES: No pedal edema. 


SKIN: No rashes, no jaundice. 


NEUROLOGIC: Alert and oriented to person and place, no asterixis noted. 





- Labs


CBC & Chem 7: 


                                 05/30/20 04:50





                                 05/31/20 09:40


Labs: 


                  Abnormal Lab Results - Last 24 Hours (Table)











  05/30/20 05/30/20 05/30/20 Range/Units





  11:47 17:41 20:23 


 


Chloride     ()  mmol/L


 


Glucose     (74-99)  mg/dL


 


POC Glucose (mg/dL)  157 H  123 H  146 H  (75-99)  mg/dL


 


Calcium     (8.4-10.2)  mg/dL














  05/31/20 05/31/20 Range/Units





  06:56 09:40 


 


Chloride   111 H  ()  mmol/L


 


Glucose   166 H  (74-99)  mg/dL


 


POC Glucose (mg/dL)  132 H   (75-99)  mg/dL


 


Calcium   8.2 L  (8.4-10.2)  mg/dL














Assessment and Plan


(1) Elevated liver enzymes


Narrative/Plan: 


49-year-old female presenting with multiple complaints, found to have elevated 

liver enzymes, likely the results of alcoholic hepatitis.  They have remained 

stable.


Current Visit: Yes   Status: Acute   Code(s): R74.8 - ABNORMAL LEVELS OF OTHER 

SERUM ENZYMES   SNOMED Code(s): 481130279


   





(2) Hematemesis


Narrative/Plan: 


Some dark-colored emesis after NG tube removal, likely traumatic in the setting 

of nasogastric tube.  No further episodes.  Hemoglobin has remained stable.


Current Visit: Yes   Status: Acute   Code(s): K92.0 - HEMATEMESIS   SNOMED 

Code(s): 7621337


   





(3) Altered mental status


Narrative/Plan: 


Altered mental status secondary to hepatic encephalopathy.  Currently improving.


Current Visit: Yes   Status: Acute   Code(s): R41.82 - ALTERED MENTAL STATUS, 

UNSPECIFIED   SNOMED Code(s): 409801398


   


Plan: 





Supportive care


Continue to monitor hemoglobin and hematocrit and transfuse as needed


Low-sodium diet


Continue to monitor stool output


Continue Protonix therapy


Continue lactulose and rifaximin, titrated for 2-3 bowel movements daily


No plans for endoscopic evaluation at this time


Thank you for allowing us to participate in care of the patient

## 2020-05-31 NOTE — P.PN
Subjective


Progress Note Date: 05/31/20


Principal diagnosis: 





Cardiac arrest





The patient is seen today 05/29/2020 in follow-up in the intensive care unit.  

This is a 49-year-old female who suffered a cardiac arrest on 05/23/2020.  She 

required mechanical ventilation and was subsequently extubated yesterday 

05/28/2020.  She is currently awake and alert in no acute distress.  She is 

maintaining good O2 saturations in the 90s on 2 L/m per nasal cannula.  No 

current IV fluids required.  No drips.  This x-ray does show bibasilar 

infiltrates right greater than left.  He had required 2 units of packed red 

blood cells, 2 fresh frozen plasma and 1 platelet this admission.  Urine culture

was positive for E. coli.  Sputum culture negative.  Blood cultures reveal no 

growth.  White count 4.9.  Hemoglobin 8.9.  Platelets 56,000.  Sodium 137.  

Potassium 3.2.  Creatinine 2.17.  .  ALT 83.  She remains on Zosyn.





The patient is seen today 05/30/2020 in follow-up in the intensive care unit.  

She is currently awake and alert in no acute distress.  Maintaining good O2 

saturations in the 90s on 2 L/m per nasal cannula.  She is having a loose 

nonproductive cough.  She's afebrile.  Hemodynamically stable.  Urine culture 

positive for E. coli.  Sputum culture revealed no growth.  Blood culture 

revealed no growth.  Stool culture revealed no growth.  White count 7.3.  

Hemoglobin 8.8.  Platelet count 93,000.  Sodium 141.  Potassium 3.5.  Creatinine

1.55.  0.9 normal saline at 20 ML's per hour.  Continued on Zosyn.





The patient is seen today 05/31/2020 in follow-up on the regular medical floor. 

She is currently sitting up in a chair at the bedside.  Awake and alert in no 

acute distress.  Maintaining O2 saturations in the 90s on 2 L/m per nasal 

cannula.  Continues with a loose nonproductive cough.  She's been afebrile.  

Hemodynamically stable.  Sodium 140.  Potassium 4.5.  Creatinine 0.84.  She 

remains on Zosyn.  Currently on Mucinex.  She did receive a dose of Lasix this 

a.m. per nephrology.





Objective





- Vital Signs


Vital signs: 


                                   Vital Signs











Temp  98 F   05/31/20 07:50


 


Pulse  86   05/31/20 07:50


 


Resp  16   05/31/20 07:50


 


BP  122/73   05/31/20 07:50


 


Pulse Ox  91 L  05/31/20 07:50








                                 Intake & Output











 05/30/20 05/31/20 05/31/20





 18:59 06:59 18:59


 


Intake Total 600  


 


Output Total 350  


 


Balance 250  


 


Weight  88.5 kg 


 


Intake:   


 


    


 


    Piperacillin-Tazobactam 3 100  





    .375 gm In Sodium   





    Chloride 0.9% 100 ml @ 25   





    mls/hr IVPB Q8HR [facility] Rx#   





    :[id number]   


 


  Oral 500  


 


Output:   


 


  Urine 350  


 


Other:   


 


  Voiding Method Incontinent Incontinent Incontinent


 


  # Voids 1 1 


 


  # Bowel Movements 1  








                       ABP, PAP, CO, CI - Last Documented











Arterial Blood Pressure        112/48

















- Exam





GENERAL EXAM: Alert, 49 year old female patient, on 2 L nasal cannula, up in a 

chair at the bedside, comfortable in no apparent distress.


HEAD: Normocephalic.


EYES: Normal reaction of pupils, equal size.


NOSE: Clear with pink turbinates.


THROAT: No erythema or exudates.


NECK: No masses, no JVD.


CHEST: No chest wall deformity.


LUNGS: Equal air entry with crackles in the bilateral posterior bases


CVS: S1 and S2 normal with no audible murmur, regular rhythm.


ABDOMEN: No hepatosplenomegaly, normal bowel sounds, no guarding or rigidity.


SPINE: No scoliosis or deformity


SKIN: No rashes


CENTRAL NERVOUS SYSTEM: No focal deficits, tone is normal in all 4 extremities.


EXTREMITIES: There is no peripheral edema.  No clubbing, no cyanosis.  

Peripheral pulses are intact.





- Labs


CBC & Chem 7: 


                                 05/30/20 04:50





                                 05/31/20 09:40


Labs: 


                  Abnormal Lab Results - Last 24 Hours (Table)











  05/30/20 05/30/20 05/31/20 Range/Units





  17:41 20:23 06:56 


 


Chloride     ()  mmol/L


 


Glucose     (74-99)  mg/dL


 


POC Glucose (mg/dL)  123 H  146 H  132 H  (75-99)  mg/dL


 


Calcium     (8.4-10.2)  mg/dL














  05/31/20 05/31/20 Range/Units





  09:40 11:52 


 


Chloride  111 H   ()  mmol/L


 


Glucose  166 H   (74-99)  mg/dL


 


POC Glucose (mg/dL)   165 H  (75-99)  mg/dL


 


Calcium  8.2 L   (8.4-10.2)  mg/dL














Assessment and Plan


Assessment: 





1 Cardiopulmonary arrest with brief resuscitation requiring intubation 

mechanical ventilatory support on 05/23/2020.  Subsequently extubated on 

05/28/2020.  Currently maintaining good O2 saturations on 2 L/m per nasal 

cannula.





2 Acute hypoxemic respiratory failure secondary to above, recovered





3 Acute septic shock and possible ischemic colitis and/or suspected intra-

abdominal source of sepsis, recovered





4 E. coli urinary tract infection with sepsis





5 Acute blood loss anemia requiring 2 units of packed red blood cell 

transfusions current hemoglobin 8.8





6 Acute thrombocytopenia secondary to sepsis





7 Acute liver shock with hypotension secondary to sepsis, recovered





8 Altered mental status suspect secondary to hepatic encephalopathy





9 Acute kidney injury secondary to above requiring hemodialysis, right catheter 

remains in place





10 Chronic kidney disease





11 Acute ALCOHOL intoxication





12 Chronic tobacco dependence





Plan:





The patient was seen and evaluated by Dr. Jennifer Lerma and Mucinex


Additional IV Lasix per nephrology today


Hemodialysis catheter remains in place no recent treatments, improving


Chest x-ray in a.m.


Encourage increased use the incentive spirometer and cough and deep breathing 

exercises


Increase her activity as tolerated


We'll continue to follow





I, the cosigning physician, performed a history & physical examination of the 

patient. Lungs sounds with crackles in the bilateral posterior bases.  

Maintaining good O2 saturations in the 90s on 2 L/m per nasal cannula.  I dis

cussed the assessment and plan of care with my nurse practitioner, Dina Wills. I

attest to the above note as dictated by her.

## 2020-06-01 VITALS — TEMPERATURE: 99 F | SYSTOLIC BLOOD PRESSURE: 144 MMHG | DIASTOLIC BLOOD PRESSURE: 87 MMHG

## 2020-06-01 VITALS — HEART RATE: 116 BPM

## 2020-06-01 VITALS — RESPIRATION RATE: 18 BRPM

## 2020-06-01 LAB
GLUCOSE BLD-MCNC: 107 MG/DL (ref 75–99)
GLUCOSE BLD-MCNC: 114 MG/DL (ref 75–99)
GLUCOSE BLD-MCNC: 116 MG/DL (ref 75–99)
GLUCOSE BLD-MCNC: 198 MG/DL (ref 75–99)

## 2020-06-01 PROCEDURE — 5A12012 PERFORMANCE OF CARDIAC OUTPUT, SINGLE, MANUAL: ICD-10-PCS

## 2020-06-01 RX ADMIN — INSULIN ASPART SCH UNIT: 100 INJECTION, SOLUTION INTRAVENOUS; SUBCUTANEOUS at 13:06

## 2020-06-01 RX ADMIN — INSULIN ASPART SCH: 100 INJECTION, SOLUTION INTRAVENOUS; SUBCUTANEOUS at 17:19

## 2020-06-01 RX ADMIN — FUROSEMIDE SCH MG: 10 INJECTION, SOLUTION INTRAMUSCULAR; INTRAVENOUS at 08:14

## 2020-06-01 RX ADMIN — PIPERACILLIN AND TAZOBACTAM SCH MLS/HR: 3; .375 INJECTION, POWDER, FOR SOLUTION INTRAVENOUS at 00:20

## 2020-06-01 RX ADMIN — BUDESONIDE AND FORMOTEROL FUMARATE DIHYDRATE SCH PUFF: 160; 4.5 AEROSOL RESPIRATORY (INHALATION) at 05:44

## 2020-06-01 RX ADMIN — ACETAMINOPHEN PRN MG: 500 TABLET ORAL at 12:15

## 2020-06-01 RX ADMIN — PANTOPRAZOLE SODIUM SCH MG: 40 INJECTION, POWDER, FOR SOLUTION INTRAVENOUS at 08:14

## 2020-06-01 RX ADMIN — GUAIFENESIN AND DEXTROMETHORPHAN HYDROBROMIDE SCH EACH: 600; 30 TABLET, EXTENDED RELEASE ORAL at 08:13

## 2020-06-01 RX ADMIN — IPRATROPIUM BROMIDE AND ALBUTEROL SULFATE PRN ML: .5; 3 SOLUTION RESPIRATORY (INHALATION) at 10:47

## 2020-06-01 RX ADMIN — RIFAXIMIN SCH MG: 550 TABLET ORAL at 08:14

## 2020-06-01 RX ADMIN — MIDODRINE HYDROCHLORIDE SCH MG: 5 TABLET ORAL at 12:14

## 2020-06-01 RX ADMIN — FLUTICASONE PROPIONATE SCH SPRAY: 50 SPRAY, METERED NASAL at 08:17

## 2020-06-01 RX ADMIN — PIPERACILLIN AND TAZOBACTAM SCH MLS/HR: 3; .375 INJECTION, POWDER, FOR SOLUTION INTRAVENOUS at 08:15

## 2020-06-01 RX ADMIN — MIDODRINE HYDROCHLORIDE SCH MG: 5 TABLET ORAL at 08:13

## 2020-06-01 RX ADMIN — CHOLESTYRAMINE SCH GM: 4 POWDER, FOR SUSPENSION ORAL at 17:45

## 2020-06-01 RX ADMIN — MIDODRINE HYDROCHLORIDE SCH MG: 5 TABLET ORAL at 17:45

## 2020-06-01 RX ADMIN — CHOLESTYRAMINE SCH GM: 4 POWDER, FOR SUSPENSION ORAL at 08:15

## 2020-06-01 RX ADMIN — PIPERACILLIN AND TAZOBACTAM SCH MLS/HR: 3; .375 INJECTION, POWDER, FOR SOLUTION INTRAVENOUS at 15:50

## 2020-06-01 NOTE — PN
PROGRESS NOTE



CHIEF COMPLAINT:

Status post cardiorespiratory arrest.



HISTORY OF PRESENT ILLNESS:

This lady continues to improve and do fairly well.  She is quite agitated and anxious,

however.  She apparently has psychiatric history looking at the medications she is on

and will seek a consult.



REVIEW OF SYSTEMS:

She has had no chest pain, shortness of breath, etc.  She states she can't walk due to

weakness and she also has a physical left-sided headache.



PHYSICAL EXAMINATION:

Her chest is clear.  The cardiac exam is normal.  Abdomen is soft, nontender.  Neck is

supple.



IMPRESSION:

1. Status post cardiorespiratory arrest.

2. Headache.

3. Anxiety and agitation.



PLAN:

1. Psych consult.

2. Physical therapy.

3. Increase Celexa from 20-40 mg a day.

4. Treat anxiety.





MMODL / IJN: [id number] / Job#: [id number]

## 2020-06-01 NOTE — PN
PROGRESS NOTE



CHIEF COMPLAINT:

Status post cardiorespiratory arrest.



HISTORY OF PRESENT ILLNESS:

This lady continues to improve.  She still complains of a general anterior chest wall

pain was likely related to her resuscitation.  She is also having slight headache on

the left side, but has no focal neurologic issues related to that.  She has

subconjunctival hemorrhage in the left eye with proptosis, but both eyes are prominent.

A T4 will be ordered.  She also wants to get back on some of her medications that she

was on.  These will be ordered including Symbicort.  She will also be started on

updrafts.  She is requesting another CT of the brain, but this probably would not be

productive considering that she is improving and could be done at a later date, if

necessary.



PHYSICAL EXAMINATION:

Physical exam is otherwise unremarkable.

Chest is clear.

Cardiac exam is normal.

Abdomen is soft, nontender.



IMPRESSION:

1. Status post cardiorespiratory arrest.

2. Renal failure-resolving.

3. Subconjunctival hemorrhage, left eye.

4. Asthma related shortness of breath and cough.

5. Proptosis.



PLAN:

1. Restart her home medications.

2. Symbicort 160/4.5 two puffs twice a day.

3. Updrafts with DuoNeb.

4. Obtain a free T4.

5. Plan to continue with rehab until she can be discharged.



MMODL / IJN: [id number] / Job#: [id number]

## 2020-06-01 NOTE — XR
EXAMINATION TYPE: XR chest 1V portable

 

DATE OF EXAM: 6/1/2020

 

Comparison: 5/29/2020

 

Clinical History: 49-year-old female CHF

 

Findings:

Heart borderline enlarged. A loop recorder device projects over the left side of the heart. Worsening
 bilateral diffuse airspace opacities. Underlying small effusions though slightly improved on the rig
ht.

 

 

Impression:

Worsening bilateral diffuse airspace disease/pulmonary edema. Small effusions.

## 2020-06-01 NOTE — P.EN
Code Blue Note: 





Back ground: Patient was hospitalized on 5/23 due to being found unresponsive. 

She was noted to have low blood sugars in the ED and was obtunded. She became 

hypotensive in the ED was started on levophed and then became bradycardiac and 

arrested. At that time she was noted to have gross metabolic and hematologic 

derrangements. She was stabilized and transferred to the ICU. She did required 

HD but had been off this successfully for a few days. She had been improving and

was awake and talking today.  





Arrived to the room: CPR in Progress, Patient was being hooked up to the crash 

cart. CPR started at 1852. 


Inciting event: Patient was been seen about 10 minutes earlier and was feeling 

anxious but alert and talking, then when the aid came into preform vitals the 

patient was unresponsive. 





Once patient hooked upto monitor she was found to be in asystole. She was 

intubated by the CRNA successfully. Sugar 106. During the code she did briefly 

regain pulses X 2 but they were weak and thready and lasted less than 30 

seconds. Last set of labs for electrolytes was 5/31 and reviewed with normal 

electrolytes. 








Medications given during code: 


EPi 8 amps 


Bicarb 6 amps


Calcium chloride 1 amp 


Atropin 1 mg 


1L NS


Levophed started less than 1 minute





For further code details see Code Note. 








Time of death: 1918


Dr. Avery notified by myself


Legal Guardian notified by Anna MENESES





A total of 25 minutes of Critical care time was spent on the care of this 

patient.

## 2020-06-01 NOTE — PN
PROGRESS NOTE



DATE OF SERVICE:

05/31/2020



REASON FOR FOLLOWUP:

Ischemic colitis and aspiration pneumonitis.



INTERVAL HISTORY:

The patient is currently afebrile.  The patient is breathing more comfortably.  Denies

having any chest pain.  Occasional cough.  No abdominal pain. No diarrhea.



PHYSICAL EXAMINATION:

On examination, her blood pressure 129/78 with a pulse of 106, temperature 98.8.  She

is 96% on 2 L nasal cannula.

General description is a middle-aged female lying in bed in no distress.

RESPIRATORY SYSTEM: Unlabored breathing, clear to auscultation anteriorly.  HEART: S1,

S2.  Regular rate and rhythm.

ABDOMEN: Soft, no tenderness.



LABS:

BUN of 15, creatinine 0.84.



DIAGNOSTIC IMPRESSION AND PLAN:

Patient admitted to the hospital with sepsis with component of ischemic colitis and

aspiration pneumonitis with Escherichia coli  urinary tract infection. Patient is

covered with Zosyn to finish therapy with oral antibiotic on discharge.  Continue with

supportive care.





MMODL / IJN: [id number] / Job#: [id number]

## 2020-06-01 NOTE — P.PN
Subjective


Progress Note Date: 06/01/20


Principal diagnosis: 


 Status post cardiac arrest





The patient is seen today 05/29/2020 in follow-up in the intensive care unit.  

This is a 49-year-old female who suffered a cardiac arrest on 05/23/2020.  She 

required mechanical ventilation and was subsequently extubated yesterday 

05/28/2020.  She is currently awake and alert in no acute distress.  She is 

maintaining good O2 saturations in the 90s on 2 L/m per nasal cannula.  No 

current IV fluids required.  No drips.  This x-ray does show bibasilar 

infiltrates right greater than left.  He had required 2 units of packed red bl

ood cells, 2 fresh frozen plasma and 1 platelet this admission.  Urine culture 

was positive for E. coli.  Sputum culture negative.  Blood cultures reveal no 

growth.  White count 4.9.  Hemoglobin 8.9.  Platelets 56,000.  Sodium 137.  

Potassium 3.2.  Creatinine 2.17.  .  ALT 83.  She remains on Zosyn.





The patient is seen today 05/30/2020 in follow-up in the intensive care unit.  

She is currently awake and alert in no acute distress.  Maintaining good O2 

saturations in the 90s on 2 L/m per nasal cannula.  She is having a loose 

nonproductive cough.  She's afebrile.  Hemodynamically stable.  Urine culture 

positive for E. coli.  Sputum culture revealed no growth.  Blood culture 

revealed no growth.  Stool culture revealed no growth.  White count 7.3.  

Hemoglobin 8.8.  Platelet count 93,000.  Sodium 141.  Potassium 3.5.  Creatinine

1.55.  0.9 normal saline at 20 ML's per hour.  Continued on Zosyn.





The patient is seen today 05/31/2020 in follow-up on the regular medical floor. 

She is currently sitting up in a chair at the bedside.  Awake and alert in no 

acute distress.  Maintaining O2 saturations in the 90s on 2 L/m per nasal 

cannula.  Continues with a loose nonproductive cough.  She's been afebrile.  

Hemodynamically stable.  Sodium 140.  Potassium 4.5.  Creatinine 0.84.  She re

prasad on Zosyn.  Currently on Mucinex.  She did receive a dose of Lasix this 

a.m. per nephrology.





On 06/01/2020 patient is doing well, sitting up in a chair, in no acute 

distress,   is answering questions appropriately, she denies any worsening 

shortness of breath, she is on 2 L of oxygen pulse ox is 100%, no fever or 

chills, she is in sinus mechanism with a rate of 83 bpm.  Patient has significa

nt generalized edema and edema involving lower extremities.  Remains on Zosyn 

for E. coli urinary tract infection.  Today's chest x-ray was reviewed showing 

worsening bilateral diffuse airspace disease/pulmonary edema and small pleural 

effusions.  Foster catheter has been discontinued, patient is voiding, she is 

incontinent of urine, difficult to adequately estimated her net fluid balance.  

But overall her weight is up 11 kg 





Objective





- Vital Signs


Vital signs: 


                                   Vital Signs











Temp  97.7 F   06/01/20 06:49


 


Pulse  104 H  06/01/20 10:59


 


Resp  18   06/01/20 06:49


 


BP  122/64   06/01/20 06:49


 


Pulse Ox  100   06/01/20 06:49








                                 Intake & Output











 05/31/20 06/01/20 06/01/20





 18:59 06:59 18:59


 


Output Total 800 850 


 


Balance -800 -850 


 


Weight  92 kg 


 


Output:   


 


  Urine 800 850 


 


Other:   


 


  Voiding Method Incontinent  Incontinent


 


  # Bowel Movements  1 








                       ABP, PAP, CO, CI - Last Documented











Arterial Blood Pressure        112/48

















- Exam


 GENERAL EXAM: Alert, very pleasant -American female, sitting up in the 

chair, generally weak, but no acute distress, room air pulse ox of 100%, 

comfortable in no apparent distress.


HEAD: Normocephalic/atraumatic.


EYES: Normal reaction of pupils, equal size.  Conjunctiva pink, sclera white.


NOSE: Clear with pink turbinates.


THROAT: No erythema or exudates.


NECK: No masses, no JVD, no thyroid enlargement, no adenopathy.


CHEST: No chest wall deformity.  Symmetrical expansion. 


LUNGS: Equal air entry with bibasilar crackles, but no wheeze, rhonchi or dullne

ss.


CVS: Regular rate and rhythm, normal S1 and S2, no gallops, no murmurs, no rubs


ABDOMEN: Soft, nontender.  No hepatosplenomegaly, normal bowel sounds, no 

guarding or rigidity.


EXTREMITIES: No clubbing, 1+ lower extremity edema, no cyanosis, 2+ pulses and 

upper and lower extremities.


MUSCULOSKELETAL: Muscle strength and tone normal.


SPINE: No scoliosis or deformity


SKIN: No rashes


CENTRAL NERVOUS SYSTEM: Alert and oriented -3.  No focal deficits, tone is 

normal in all 4 extremities.


PSYCHIATRIC: Alert and oriented -3.  Appropriate affect.  Intact judgment and 

insight.











- Labs


CBC & Chem 7: 


                                 05/30/20 04:50





                                 05/31/20 09:40


Labs: 


                  Abnormal Lab Results - Last 24 Hours (Table)











  05/31/20 05/31/20 06/01/20 Range/Units





  16:59 20:56 06:51 


 


POC Glucose (mg/dL)  172 H  119 H  116 H  (75-99)  mg/dL














  06/01/20 Range/Units





  11:35 


 


POC Glucose (mg/dL)  198 H  (75-99)  mg/dL








                      Microbiology - Last 24 Hours (Table)











 05/25/20 15:10 Stool Culture - Final





 Stool 














Assessment and Plan


Plan: 


 Assessment:





1 Cardiopulmonary arrest with brief resuscitation requiring intubation 

mechanical ventilatory support on 05/23/2020.  Subsequently extubated on 

05/28/2020.  Currently maintaining good O2 saturations on 2 L/m per nasal 

cannula.





2 Acute hypoxemic respiratory failure secondary to above, recovered





3 Acute septic shock and possible ischemic colitis and/or suspected intra-

abdominal source of sepsis, recovered





4 E. coli urinary tract infection with sepsis





5 Acute blood loss anemia requiring 2 units of packed red blood cell 

transfusions current hemoglobin 8.8





6 Acute thrombocytopenia secondary to sepsis





7 Acute liver shock with hypotension secondary to sepsis, recovered





8 Altered mental status suspect secondary to hepatic encephalopathy





9 Acute kidney injury secondary to above requiring hemodialysis, right catheter 

remains in place





10 Chronic kidney disease





11 Acute ALCOHOL intoxication





12 Chronic tobacco dependence





Plan:





Case chest x-ray was reviewed showing pulmonary edema, and patient has quite 

significant generalized edema, continue with diuretic therapy, accurate intake 

and output, daily labs, electrolytes and renal profile, continue same 

antibiotics, increase activity as tolerated, follow-up chest x-ray in the 

morning. 





I performed a history & physical examination of the patient and discussed their 

management with my nurse practitioner, Deja Hawley.  I reviewed the nurse 

practitioner's note and agree with the documented findings and plan of care.  

Lung sounds are positive for diminished breath sounds.  The findings and the 

impression was discussed with the patient.  I attest to the documentation by the

nurse practitioner. 


Time with Patient: Less than 30

## 2020-06-01 NOTE — PN
PROGRESS NOTE



Patient is seen for followup for acute kidney injury.  She required dialysis initially.

However, her renal function has continued to improve.  Serum creatinine was 0.84 mg/dL

yesterday.  The patient was noticed to be in volume overload and started on Lasix

yesterday.



PHYSICAL EXAMINATION:

On examination today, patient is comfortable.  She appears to be anxious.  Heart rate

100 per minute, blood pressure was 122/64.  She is less short of breath than yesterday.

EXAMINATION OF THE HEART: S1 and S2.

EXAMINATION OF LUNGS: Decreased breath sounds at bases. Minimal basal crackles are

heard.

ABDOMEN:  Soft, non-tender.

Examination of lower extremities shows edema 2+ bilaterally.



LABS:

Labs are not available from today.  Serum creatinine 0.84 yesterday.



ASSESSMENT:

1. Acute kidney injury, acute tubular necrosis, currently significantly improved.

    Discharge dialysis catheter.

2. Escherichia coli urinary tract infection.

3. Sepsis from urinary tract infection, now improved.

4. Volume overload, maintained on IV Lasix.

5. Status post cardiopulmonary arrest, status post extubation.

6. Acute shock liver from hypotension and sepsis.

7. Acute alcohol intoxication.



PLAN:

Continue to diurese patient.  Xanax p.o. x1.





MMODL / IJN: [id number] / Job#: [id number]

## 2020-06-03 NOTE — DS
DISCHARGE SUMMARY



CHIEF COMPLAINT:

Cardiorespiratory arrest.



HISTORY OF PRESENT ILLNESS AND PHYSICAL EXAM:

The details of this lady's history and physical can be found in the initial workup.



LABORATORY STUDIES:

While she was in a hospital she had laboratory studies, details of which can be found

in the laboratory section of her chart.



COURSE IN HOSPITAL:

After she was admitted, she was placed on bedrest in ICU and placed on a ventilator.

The etiology of for her arrest was never determined.  It was thought that she may have

had an intraabdominal catastrophe, but this turned out not to be the case.  She was on

the ventilator for several days with very little response and then gradually became she

started to improve.  She did require dialysis for several days, but then this was

stopped as her renal function began to return.  She was eventually able to trigger the

ventilator and started to make purposeful movements and subsequently was able to be

extubated.  After that, she was found to be significantly neurologically intact and was

able to communicate appropriately, verbally and was eventually moved to [facility] care.

She was improving with physical therapy and it was felt that she would be able to be

discharged in a few days when she suddenly went into a cardiorespiratory arrest on the

evening of the .  After an extensive and vigorous effort to resuscitate her, she

.



FINAL DIAGNOSES:

1. Cardiorespiratory arrest, etiology unknown.

2. Acute tubular necrosis with recovery.

3. Ventilator dependency.



OPERATIONS:

None.



CONSULTATIONS:

Intensive Medicine, Neurology.



She was not improved.  She .





MMRAKESH / JAKE: [id number] / Job#: [id number]

## 2020-06-03 NOTE — CDI
Documentation Clarification Form



Date: 6/3/20

From: Johanna Lin

Phone: If you have a question about this query, please contact Kenzie Pantoja, 
 at [phone] between 8am and 5pm.

Admit Date: 5/23/20

Discharge Date:6/1/20

Patient Name: Kenzie Renae

Visit Number: [id number]



ATTENTION: The Clinical Documentation Specialists (CDI) and [facility] Coding Staff 
appreciate your assistance in clarifying documentation. Please respond to the 
clarification below the line at the bottom and electronically sign. The CDI & 
[facility] Coding staff will review the response and follow-up if needed. Please note: 
Queries are made part of the Legal Health Record. If you have any questions, 
please contact the author of this message via ITS.



Dear Dr. Stephen



CKD is documented in Dr. Rolon's 5/27 progress note and Dr. Rodriguez's 5/28 - 6/1 
progress notes.



History/Risk Factors: JACE secondary to ATN, hypotension, Alcohol intoxication, 
septic shock, 

     Patients Historical BUN/CR/GFR: No documentation of previous renal 
function.   

Clinical Indicators: 

    Current BUN/CR/GFR: On admit - 11/3.55/17; Last day of stay - 15/0.84/>90

Treatment: 3 liters NS bolus



In order to capture the severity of condition, please clarify the stage of the 
CKD, if known:



CKD Stage 1 (GFR > 90)

CKD Stage 2 (GFR 60-89)

CKD Stage 3 (GFR 30-59)

CKD Stage 4 (GFR 15-29)

CKD Stage 5 (GFR <15)

ESRD

Other, please specify  ___________

Unable to determine

______________________atn_____________________________________________________

MTDD

## 2020-06-04 NOTE — MISC
MISCELLANOUS REPORT



QUERY:

Ischemic colitis ruled out.





MMODL / IJN: [id number] / Job#: [id number]

## 2020-06-11 NOTE — CDI
Documentation Clarification Form



Date: 2020 10:48:47 AM

From: Marysol HusainSOO, CCDS

Phone:  [phone]

MRN: [id number]

Admit Date: 2020 03:44:00 PM

Patient Name: Kenzie Renae

Visit Number: [id number]

Discharge Date:  2020 07:26:00 PM



ATTENTION: The Clinical Documentation Specialists (CDI) and [facility] Coding Staff 
appreciate your assistance in clarifying documentation. Please respond to the 
clarification below the line at the bottom and electronically sign. The CDI & 
[facility] Coding staff will review the response and follow-up if needed. Please note: 
Queries are made part of the Legal Health Record. If you have any questions, 
please contact the author of this message via ITS.



Dr. Ochoa Avery: 



Per the ED documentation: "ET tube retracted twice due to right main stem 
intubation". OG tube placed w/immediate output of 75cc bright red blood. 

Per the  GI Consult: "Acute upper gastrointestinal bleed. The patient had 
small amount of bright red blood from the NG tube after intubation following 
cardiac arrest yesterday, but since then she did not have any further episodes 
of bleeding. Hemoglobin remains stable at 9.3 g/dL."



Patients Admitting Diagnosis (ED ): Alcoholic intoxication, Altered mental 
status, Hypoglycemia, Hypothermia, JACE, Lactic Acidosis, Anemia, Cardiopulmonary
Arrest, UTI, Hyperkalemia, Thrombocytopenia & Hypotension. After admission, 
diagnosed with E Coli Sepsis, Septic Shock, Acute hypoxic respiratory failure, 
JACE/JACE and Anoxic Brain Injury. An intra-abdominal source of infection was 
suspected but ruled out. 

History: Alcohol abuse, Back pain, Depression, Breast CA, Drug abuse, Suicide 
attempt.

Clinical Indicators:  Presented to the ED on  after being found unresponsive
by her . Altered mental status, obtunded, diaphoretic, pallor, 
hypotensive. Became bradycardic, then astyolic in ED, CPR, intubated. Developed 
aspiration pneumonia. Patient  on .

 H/H:  6.3**/23.7*

 CT Abdomen & Pelvis: Close surveillance with Hematocrit & Hemoglobin is 
recommended as the unenhanced exam is limited to evaluate visceral trauma. 

 CXR: Main stem bronchus intubation, tube was retracted. OG tube placed & 
immediately put out 75 cc of bright red blood. 



Treatment:   Transfused 2 units PRBCs, 1 unit Platelets, 2 units FFP, 
Central line & intubated in ED. 2L bolus normal saline. 



In order to accurately reflect this patients severity of illness, please 
clarify if the Acute Blood Loss Anemia:   



    Is a complication of the insertion of the NGT tube and/or ET tube placement

    Is an expected outcome of the insertion of the NGT and/or ET tube placement

    Is related to co-morbid condition(s) of: ___________________ 

    Other, please specify

    Unable to determine



(Last Revision: 2020)

MTDD

## 2020-06-12 NOTE — MISC
MISCELLANOUS REPORT



QUERY:

Blood loss anemia: Unable to determine that.





MMODL / IJN: [id number] / Job#: [id number]
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
